# Patient Record
Sex: MALE | Race: WHITE | NOT HISPANIC OR LATINO | Employment: FULL TIME | URBAN - METROPOLITAN AREA
[De-identification: names, ages, dates, MRNs, and addresses within clinical notes are randomized per-mention and may not be internally consistent; named-entity substitution may affect disease eponyms.]

---

## 2017-01-30 RX ORDER — ATORVASTATIN CALCIUM 80 MG/1
80 TABLET, FILM COATED ORAL NIGHTLY
Qty: 90 TABLET | Refills: 0 | Status: SHIPPED | OUTPATIENT
Start: 2017-01-30 | End: 2017-05-08 | Stop reason: SDUPTHER

## 2017-01-30 RX ORDER — LISINOPRIL 5 MG/1
5 TABLET ORAL DAILY
Qty: 90 TABLET | Refills: 0 | Status: SHIPPED | OUTPATIENT
Start: 2017-01-30 | End: 2017-05-08 | Stop reason: SDUPTHER

## 2017-05-05 RX ORDER — ATORVASTATIN CALCIUM 80 MG/1
TABLET, FILM COATED ORAL
Qty: 90 TABLET | Refills: 0 | Status: CANCELLED | OUTPATIENT
Start: 2017-05-05

## 2017-05-05 RX ORDER — LISINOPRIL 5 MG/1
TABLET ORAL
Qty: 90 TABLET | Refills: 0 | Status: CANCELLED | OUTPATIENT
Start: 2017-05-05

## 2017-05-08 ENCOUNTER — TELEPHONE (OUTPATIENT)
Dept: CARDIOLOGY | Facility: CLINIC | Age: 57
End: 2017-05-08

## 2017-05-08 DIAGNOSIS — I25.10 CORONARY ARTERY DISEASE INVOLVING NATIVE CORONARY ARTERY OF NATIVE HEART WITHOUT ANGINA PECTORIS: Primary | ICD-10-CM

## 2017-05-08 RX ORDER — ATORVASTATIN CALCIUM 80 MG/1
80 TABLET, FILM COATED ORAL NIGHTLY
Qty: 90 TABLET | Refills: 0 | Status: SHIPPED | OUTPATIENT
Start: 2017-05-08 | End: 2017-12-20 | Stop reason: SDUPTHER

## 2017-05-08 RX ORDER — LISINOPRIL 5 MG/1
5 TABLET ORAL DAILY
Qty: 90 TABLET | Refills: 0 | Status: SHIPPED | OUTPATIENT
Start: 2017-05-08 | End: 2017-12-20 | Stop reason: SDUPTHER

## 2017-05-26 ENCOUNTER — OFFICE VISIT (OUTPATIENT)
Dept: CARDIOLOGY | Facility: CLINIC | Age: 57
End: 2017-05-26

## 2017-05-26 VITALS
DIASTOLIC BLOOD PRESSURE: 82 MMHG | WEIGHT: 205 LBS | HEIGHT: 72 IN | HEART RATE: 68 BPM | BODY MASS INDEX: 27.77 KG/M2 | SYSTOLIC BLOOD PRESSURE: 124 MMHG

## 2017-05-26 DIAGNOSIS — E78.5 HYPERLIPIDEMIA, UNSPECIFIED HYPERLIPIDEMIA TYPE: ICD-10-CM

## 2017-05-26 DIAGNOSIS — I10 ESSENTIAL HYPERTENSION: ICD-10-CM

## 2017-05-26 DIAGNOSIS — I25.10 ATHEROSCLEROSIS OF NATIVE CORONARY ARTERY OF NATIVE HEART WITHOUT ANGINA PECTORIS: Primary | ICD-10-CM

## 2017-05-26 DIAGNOSIS — I73.9 PAD (PERIPHERAL ARTERY DISEASE) (HCC): ICD-10-CM

## 2017-05-26 PROCEDURE — 99214 OFFICE O/P EST MOD 30 MIN: CPT | Performed by: NURSE PRACTITIONER

## 2017-05-26 PROCEDURE — 93000 ELECTROCARDIOGRAM COMPLETE: CPT | Performed by: NURSE PRACTITIONER

## 2017-05-26 RX ORDER — ASPIRIN 81 MG/1
81 TABLET ORAL 2 TIMES DAILY
COMMUNITY
End: 2019-07-10

## 2017-05-26 RX ORDER — CILOSTAZOL 100 MG/1
1 TABLET ORAL 2 TIMES DAILY
Refills: 3 | COMMUNITY
Start: 2017-05-01 | End: 2019-07-10

## 2017-12-20 RX ORDER — LISINOPRIL 5 MG/1
TABLET ORAL
Qty: 90 TABLET | Refills: 0 | Status: SHIPPED | OUTPATIENT
Start: 2017-12-20 | End: 2018-04-03 | Stop reason: SDUPTHER

## 2017-12-20 RX ORDER — ATORVASTATIN CALCIUM 80 MG/1
TABLET, FILM COATED ORAL
Qty: 90 TABLET | Refills: 0 | Status: SHIPPED | OUTPATIENT
Start: 2017-12-20 | End: 2018-04-15 | Stop reason: SDUPTHER

## 2018-04-04 RX ORDER — LISINOPRIL 5 MG/1
TABLET ORAL
Qty: 90 TABLET | Refills: 0 | Status: SHIPPED | OUTPATIENT
Start: 2018-04-04 | End: 2018-11-03 | Stop reason: HOSPADM

## 2018-04-17 RX ORDER — ATORVASTATIN CALCIUM 80 MG/1
80 TABLET, FILM COATED ORAL NIGHTLY
Qty: 30 TABLET | Refills: 0 | Status: SHIPPED | OUTPATIENT
Start: 2018-04-17 | End: 2018-05-26

## 2018-05-01 ENCOUNTER — OFFICE VISIT (OUTPATIENT)
Dept: CARDIOLOGY | Facility: CLINIC | Age: 58
End: 2018-05-01

## 2018-05-01 VITALS
HEART RATE: 74 BPM | WEIGHT: 205.2 LBS | BODY MASS INDEX: 27.79 KG/M2 | DIASTOLIC BLOOD PRESSURE: 80 MMHG | SYSTOLIC BLOOD PRESSURE: 122 MMHG | HEIGHT: 72 IN

## 2018-05-01 DIAGNOSIS — R06.02 SHORTNESS OF BREATH: ICD-10-CM

## 2018-05-01 DIAGNOSIS — I73.9 PAD (PERIPHERAL ARTERY DISEASE) (HCC): ICD-10-CM

## 2018-05-01 DIAGNOSIS — E78.5 HYPERLIPIDEMIA, UNSPECIFIED HYPERLIPIDEMIA TYPE: ICD-10-CM

## 2018-05-01 DIAGNOSIS — I25.10 ATHEROSCLEROSIS OF NATIVE CORONARY ARTERY OF NATIVE HEART WITHOUT ANGINA PECTORIS: Primary | ICD-10-CM

## 2018-05-01 DIAGNOSIS — I10 ESSENTIAL HYPERTENSION: ICD-10-CM

## 2018-05-01 PROCEDURE — 99214 OFFICE O/P EST MOD 30 MIN: CPT | Performed by: INTERNAL MEDICINE

## 2018-05-01 PROCEDURE — 93000 ELECTROCARDIOGRAM COMPLETE: CPT | Performed by: INTERNAL MEDICINE

## 2018-05-01 NOTE — PROGRESS NOTES
Date of Office Visit: 2018  Encounter Provider: Germania Peterson MD  Place of Service: [facility] CARDIOLOGY  Patient Name: Sylvain Amador  :1960      Patient ID:  Sylvain Amador is a 57 y.o. male is here for  followup for CAD.         History of Present Illness       He was seen in 10/2014 for chest pain after he had made an emergency department visit. He  was having daily chest pain worsening with activity. He rated it an 8 out of 10 in the  midsternal area going to the shoulders and arms, associated with short-windedness, nausea,  and dizziness. He also had some cold chills. When I saw him, I was concerned this was  coronary ischemia, so we set him up for a stress nuclear perfusion study done 10/24/2014,  showing a small infarct with a moderate amount of anuradha-infarct ischemia in the inferior  wall. Ejection fraction was 46%. He also had complaints of lower extremity cramping with  walking. He would have to stop and rest before he would be able to go on. He had a lower  extremity arterial duplex study done 10/24/2014, showing a severe obstructive disease  involving the left and right common femoral and superficial femoral artery systems.      On 10/28/2014, he underwent cardiac catheterization, which showed 40% diffuse  emb-pu-szbeqt LAD stenosis, 90% discrete OM-1 stenosis, 90% discrete mid-RCA stenosis, 40%  distal RCA stenosis of the bifurcation. He had plain old balloon angioplasty at the first  obtuse marginal branch, and he received a 3.5 x 15 mm Xience Xpedition drug-eluting stent  to the mid-RCA. The OM-1 branch did not get a stent. His ejection fraction at cath was  normal.      He then on 2014 had a CTA of the abdomen and pelvis with lower extremity runoff.  This showed completely occlusion of the superficial femoral arteries bilaterally with  reconstitution of popliteal arteries via the profunda femoral collaterals. There was also  a fusiform  infrarenal aortic aneurysm measuring 3.5 cm. He is scheduled to see Dr. Reyes  12/29/2014 for this.         He went on to see Dr. Reyes because of the claudication in his legs, which he still  has.  Dr. Reyes recommended angiography which the patient had done on 01/23/2015.  This  showed atherosclerotic disease of the aorta which was moderate and diffuse.  SFA was  occluded on the right.  SFA was occluded on the left.  Mild disease in the common femoral  artery bilaterally.  Popliteal artery was normal; reconstitution with two vessel runoff to  both of his feet.  There was a mild infrarenal abdominal aortic aneurysm.  Dr. Reyes said  there was nothing more he could do and recommended that the patient go on to see vascular  surgery. He did see Dr. Arturo Huynh who said that he really  cannot do surgery outside of vein transplantation which does not last. He has elected to  really go after the lifestyle changes, and he is doing better. He has a stress nuclear  perfusion study done for coronary disease in July 2015 that showed a small area of  myocardial infarction in the inferior wall but no ischemia and no wall motion  abnormalities. He had a 2D echocardiogram with Doppler done 07/08/2015 showing an  ejection fraction of 66%, grade 1 diastolic dysfunction, and mild aortic root dilation but  no valvular abnormalities. He had lower extremity arterial duplex studies done on  05/11/2015. The right ankle brachial index showed several arterial occlusive disease with  severe digital ischemia of the right toe. The atrial occlusive disease in the right leg  was in the aortoiliac and femoral segments. The ankle brachial index on the left showed  moderate arterial occlusive disease with moderate digital ischemia and moderate occlusive  disease of the aortoiliac and femoral segments.     He is smoking.  His wife has had breast cancer so he's been under some stress.  He has a little more short winded with activity.  He is having no chest  pain or pressure.  He's had no tachycardia, dizziness or syncope.  He is not exercising.  He does have claudication.  He is taking his medications as directed.  He has not recently had any laboratory values done.  He continues to drive bus.       Past Medical History:   Diagnosis Date   • Atherosclerosis of native coronary artery of native heart without angina pectoris    • CAD (coronary artery disease)    • Chest pain    • Coronary atherosclerosis of native coronary vessel    • Hyperlipidemia    • Hypertension    • Intermittent claudication    • PAD (peripheral artery disease)     of lower extremities         Past Surgical History:   Procedure Laterality Date   • VASECTOMY         Current Outpatient Prescriptions on File Prior to Visit   Medication Sig Dispense Refill   • aspirin 81 MG EC tablet Take 81 mg by mouth 2 (Two) Times a Day.     • atorvastatin (LIPITOR) 80 MG tablet Take 1 tablet by mouth Every Night. 30 tablet 0   • cilostazol (PLETAL) 100 MG tablet Take 1 tablet by mouth 2 (Two) Times a Day.  3   • lisinopril (PRINIVIL,ZESTRIL) 5 MG tablet TAKE 1 TABLET BY MOUTH DAILY. 90 tablet 0   • metoprolol tartrate (LOPRESSOR) 25 MG tablet TAKE 1/2 TABLET BY MOUTH TWICE DAILY. 90 tablet 0     No current facility-administered medications on file prior to visit.        Social History     Social History   • Marital status: Single     Spouse name: N/A   • Number of children: N/A   • Years of education: N/A     Occupational History   • Not on file.     Social History Main Topics   • Smoking status: Current Every Day Smoker     Packs/day: 0.50   • Smokeless tobacco: Former User      Comment: caffeine use   • Alcohol use No   • Drug use: No   • Sexual activity: Not on file     Other Topics Concern   • Not on file     Social History Narrative   • No narrative on file           Review of Systems   Constitution: Negative.   HENT: Negative for congestion.    Eyes: Negative for vision loss in left eye and vision loss in  "right eye.   Cardiovascular: Positive for leg swelling.   Respiratory: Negative.  Negative for cough, hemoptysis, shortness of breath, sleep disturbances due to breathing, snoring, sputum production and wheezing.    Endocrine: Negative.    Hematologic/Lymphatic: Negative.    Skin: Negative for poor wound healing and rash.   Musculoskeletal: Negative for falls, gout, muscle cramps and myalgias.   Gastrointestinal: Negative for abdominal pain, diarrhea, dysphagia, hematemesis, melena, nausea and vomiting.   Neurological: Negative for excessive daytime sleepiness, dizziness, headaches, light-headedness, loss of balance, seizures and vertigo.   Psychiatric/Behavioral: Negative for depression and substance abuse. The patient is not nervous/anxious.        Procedures    ECG 12 Lead  Date/Time: 5/1/2018 11:00 AM  Performed by: MARGO BECKHAM  Authorized by: MARGO BECKHAM   Comparison: compared with previous ECG   Similar to previous ECG  Rhythm: sinus rhythm  Clinical impression: normal ECG                Objective:      Vitals:    05/01/18 1048   BP: 122/80   BP Location: Left arm   Patient Position: Sitting   Pulse: 74   Weight: 93.1 kg (205 lb 3.2 oz)   Height: 182.9 cm (72\")     Body mass index is 27.83 kg/m².    Physical Exam   Constitutional: He is oriented to person, place, and time. He appears well-developed and well-nourished. No distress.   HENT:   Head: Normocephalic and atraumatic.   Eyes: Conjunctivae are normal. No scleral icterus.   Neck: Neck supple. No JVD present. Carotid bruit is not present. No thyromegaly present.   Cardiovascular: Normal rate, regular rhythm, S1 normal, S2 normal, normal heart sounds and intact distal pulses.   No extrasystoles are present. PMI is not displaced.  Exam reveals no gallop.    No murmur heard.  Pulses:       Carotid pulses are 2+ on the right side, and 2+ on the left side.       Radial pulses are 2+ on the right side, and 2+ on the left side.        Dorsalis " pedis pulses are 2+ on the right side, and 2+ on the left side.        Posterior tibial pulses are 2+ on the right side, and 2+ on the left side.   Pulmonary/Chest: Effort normal and breath sounds normal. No respiratory distress. He has no wheezes. He has no rhonchi. He has no rales. He exhibits no tenderness.   Abdominal: Soft. Bowel sounds are normal. He exhibits no distension, no abdominal bruit and no mass. There is no tenderness.   Musculoskeletal: He exhibits no edema or deformity.   Lymphadenopathy:     He has no cervical adenopathy.   Neurological: He is alert and oriented to person, place, and time. No cranial nerve deficit.   Skin: Skin is warm and dry. No rash noted. He is not diaphoretic. No cyanosis. No pallor. Nails show no clubbing.   Psychiatric: He has a normal mood and affect. Judgment normal.   Vitals reviewed.      Lab Review:       Assessment:      Diagnosis Plan   1. Atherosclerosis of native coronary artery of native heart without angina pectoris  Stress Test With Myocardial Perfusion One Day    Comprehensive Metabolic Panel    Lipid Panel   2. Essential hypertension  Stress Test With Myocardial Perfusion One Day   3. Hyperlipidemia, unspecified hyperlipidemia type     4. PAD (peripheral artery disease)  Stress Test With Myocardial Perfusion One Day   5. Shortness of breath  Stress Test With Myocardial Perfusion One Day     1. CAD, s/p POBA to OM1 and PCI with stent to mid RCA.  No angina. Has dyspnea on exertion, set up stress nuclear study.  2. Peripheral arterial disease. Occlusion of bilateral superficial femoral arteries. Sees Dr. Huynh.  Is treating this with lifestyle changes and is claudication has improved.   3. Smoking. He is smoking.   4. Hyperlipidemia, on Atorvastatin.   5. HTN, well controlled.      Plan:       Advised smoking cessation, check lipids, set up stress study.  See jamison in 1 year, no med changes.     Coronary Artery Disease  Assessment  • The patient has no  angina    Subjective - Objective  • There has been a previous stent procedure using JARRED  • Current antiplatelet therapy includes aspirin 81 mg

## 2018-05-04 ENCOUNTER — HOSPITAL ENCOUNTER (OUTPATIENT)
Dept: CARDIOLOGY | Facility: HOSPITAL | Age: 58
Discharge: HOME | End: 2018-05-04
Attending: INTERNAL MEDICINE | Admitting: INTERNAL MEDICINE

## 2018-05-04 ENCOUNTER — TELEPHONE (OUTPATIENT)
Dept: CARDIOLOGY | Facility: CLINIC | Age: 58
End: 2018-05-04

## 2018-05-04 DIAGNOSIS — R06.02 SHORTNESS OF BREATH: ICD-10-CM

## 2018-05-04 DIAGNOSIS — I25.10 ATHEROSCLEROSIS OF NATIVE CORONARY ARTERY OF NATIVE HEART WITHOUT ANGINA PECTORIS: ICD-10-CM

## 2018-05-04 DIAGNOSIS — I73.9 PAD (PERIPHERAL ARTERY DISEASE) (HCC): ICD-10-CM

## 2018-05-04 DIAGNOSIS — I10 ESSENTIAL HYPERTENSION: ICD-10-CM

## 2018-05-04 LAB
BH CV NUCLEAR PRIOR STUDY: 2
BH CV STRESS BP STAGE 1: NORMAL
BH CV STRESS COMMENTS STAGE 1: NORMAL
BH CV STRESS DOSE REGADENOSON STAGE 1: 0.4
BH CV STRESS DURATION MIN STAGE 1: 0
BH CV STRESS DURATION SEC STAGE 1: 10
BH CV STRESS HR STAGE 1: 103
BH CV STRESS PROTOCOL 1: NORMAL
BH CV STRESS RECOVERY BP: NORMAL MMHG
BH CV STRESS RECOVERY HR: 91 BPM
BH CV STRESS STAGE 1: 1
LV EF NUC BP: 58 %
MAXIMAL PREDICTED HEART RATE: 161 BPM
PERCENT MAX PREDICTED HR: 63.98 %
STRESS BASELINE BP: NORMAL MMHG
STRESS BASELINE HR: 76 BPM
STRESS PERCENT HR: 75 %
STRESS POST EXERCISE DUR SEC: 10 SEC
STRESS POST PEAK BP: NORMAL MMHG
STRESS POST PEAK HR: 103 BPM
STRESS TARGET HR: 137 BPM

## 2018-05-04 PROCEDURE — 93016 CV STRESS TEST SUPVJ ONLY: CPT | Performed by: INTERNAL MEDICINE

## 2018-05-04 PROCEDURE — 93018 CV STRESS TEST I&R ONLY: CPT | Performed by: INTERNAL MEDICINE

## 2018-05-04 PROCEDURE — 25010000002 REGADENOSON 0.4 MG/5ML SOLUTION: Performed by: INTERNAL MEDICINE

## 2018-05-04 PROCEDURE — 78452 HT MUSCLE IMAGE SPECT MULT: CPT | Performed by: INTERNAL MEDICINE

## 2018-05-04 PROCEDURE — A9502 TC99M TETROFOSMIN: HCPCS | Performed by: INTERNAL MEDICINE

## 2018-05-04 PROCEDURE — 0 TECHNETIUM TETROFOSMIN KIT: Performed by: INTERNAL MEDICINE

## 2018-05-04 PROCEDURE — 93017 CV STRESS TEST TRACING ONLY: CPT

## 2018-05-04 PROCEDURE — 78452 HT MUSCLE IMAGE SPECT MULT: CPT

## 2018-05-04 RX ADMIN — TETROFOSMIN 1 DOSE: 1.38 INJECTION, POWDER, LYOPHILIZED, FOR SOLUTION INTRAVENOUS at 08:50

## 2018-05-04 RX ADMIN — TETROFOSMIN 1 DOSE: 1.38 INJECTION, POWDER, LYOPHILIZED, FOR SOLUTION INTRAVENOUS at 07:55

## 2018-05-04 RX ADMIN — REGADENOSON 0.4 MG: 0.08 INJECTION, SOLUTION INTRAVENOUS at 08:50

## 2018-05-04 NOTE — TELEPHONE ENCOUNTER
----- Message from Germania Peterson MD sent at 5/4/2018 12:05 PM EDT -----  pls let him know that his stress is normal, no changes, see back as scheduled.

## 2018-05-04 NOTE — TELEPHONE ENCOUNTER
Called and l/m for pt.     Can you call or put the pt on re call list for 1 year f/u to see Sabrina TAMAYO.....Thanks    MT

## 2018-05-07 RX ORDER — LISINOPRIL 5 MG/1
TABLET ORAL
Qty: 90 TABLET | Refills: 1 | Status: SHIPPED | OUTPATIENT
Start: 2018-05-07 | End: 2018-11-03 | Stop reason: HOSPADM

## 2018-05-29 RX ORDER — ATORVASTATIN CALCIUM 80 MG/1
80 TABLET, FILM COATED ORAL NIGHTLY
Qty: 30 TABLET | Refills: 0 | Status: SHIPPED | OUTPATIENT
Start: 2018-05-29 | End: 2018-11-05

## 2018-08-01 ENCOUNTER — HOSPITAL ENCOUNTER (INPATIENT)
Facility: HOSPITAL | Age: 58
LOS: 3 days | Discharge: HOME | End: 2018-08-04
Attending: EMERGENCY MEDICINE | Admitting: SURGERY

## 2018-08-01 ENCOUNTER — APPOINTMENT (OUTPATIENT)
Dept: CT IMAGING | Facility: HOSPITAL | Age: 58
End: 2018-08-01

## 2018-08-01 DIAGNOSIS — I72.3 BILATERAL ILIAC ARTERY ANEURYSM (HCC): ICD-10-CM

## 2018-08-01 DIAGNOSIS — I71.40 ABDOMINAL AORTIC ANEURYSM (AAA) WITHOUT RUPTURE (HCC): Primary | ICD-10-CM

## 2018-08-01 LAB
ALBUMIN SERPL-MCNC: 4.5 G/DL (ref 3.5–5.2)
ALBUMIN/GLOB SERPL: 1.4 G/DL
ALP SERPL-CCNC: 70 U/L (ref 39–117)
ALT SERPL W P-5'-P-CCNC: 15 U/L (ref 1–41)
ANION GAP SERPL CALCULATED.3IONS-SCNC: 13.3 MMOL/L
AST SERPL-CCNC: 10 U/L (ref 1–40)
BASOPHILS # BLD AUTO: 0.03 10*3/MM3 (ref 0–0.2)
BASOPHILS NFR BLD AUTO: 0.3 % (ref 0–1.5)
BILIRUB SERPL-MCNC: 0.3 MG/DL (ref 0.1–1.2)
BILIRUB UR QL STRIP: NEGATIVE
BUN BLD-MCNC: 11 MG/DL (ref 6–20)
BUN/CREAT SERPL: 12.9 (ref 7–25)
CALCIUM SPEC-SCNC: 8.9 MG/DL (ref 8.6–10.5)
CHLORIDE SERPL-SCNC: 102 MMOL/L (ref 98–107)
CLARITY UR: CLEAR
CO2 SERPL-SCNC: 22.7 MMOL/L (ref 22–29)
COLOR UR: YELLOW
CREAT BLD-MCNC: 0.85 MG/DL (ref 0.76–1.27)
DEPRECATED RDW RBC AUTO: 46.6 FL (ref 37–54)
EOSINOPHIL # BLD AUTO: 0.11 10*3/MM3 (ref 0–0.7)
EOSINOPHIL NFR BLD AUTO: 1.1 % (ref 0.3–6.2)
ERYTHROCYTE [DISTWIDTH] IN BLOOD BY AUTOMATED COUNT: 13.9 % (ref 11.5–14.5)
GFR SERPL CREATININE-BSD FRML MDRD: 92 ML/MIN/1.73
GLOBULIN UR ELPH-MCNC: 3.2 GM/DL
GLUCOSE BLD-MCNC: 107 MG/DL (ref 65–99)
GLUCOSE UR STRIP-MCNC: NEGATIVE MG/DL
HCT VFR BLD AUTO: 46.8 % (ref 40.4–52.2)
HGB BLD-MCNC: 15.9 G/DL (ref 13.7–17.6)
HGB UR QL STRIP.AUTO: NEGATIVE
IMM GRANULOCYTES # BLD: 0.03 10*3/MM3 (ref 0–0.03)
IMM GRANULOCYTES NFR BLD: 0.3 % (ref 0–0.5)
KETONES UR QL STRIP: NEGATIVE
LEUKOCYTE ESTERASE UR QL STRIP.AUTO: NEGATIVE
LYMPHOCYTES # BLD AUTO: 2.89 10*3/MM3 (ref 0.9–4.8)
LYMPHOCYTES NFR BLD AUTO: 27.9 % (ref 19.6–45.3)
MCH RBC QN AUTO: 31.1 PG (ref 27–32.7)
MCHC RBC AUTO-ENTMCNC: 34 G/DL (ref 32.6–36.4)
MCV RBC AUTO: 91.4 FL (ref 79.8–96.2)
MONOCYTES # BLD AUTO: 0.73 10*3/MM3 (ref 0.2–1.2)
MONOCYTES NFR BLD AUTO: 7.1 % (ref 5–12)
NEUTROPHILS # BLD AUTO: 6.59 10*3/MM3 (ref 1.9–8.1)
NEUTROPHILS NFR BLD AUTO: 63.6 % (ref 42.7–76)
NITRITE UR QL STRIP: NEGATIVE
NRBC BLD MANUAL-RTO: 0 /100 WBC (ref 0–0)
PH UR STRIP.AUTO: 6 [PH] (ref 5–8)
PLATELET # BLD AUTO: 150 10*3/MM3 (ref 140–500)
PMV BLD AUTO: 11 FL (ref 6–12)
POTASSIUM BLD-SCNC: 3.9 MMOL/L (ref 3.5–5.2)
PROT SERPL-MCNC: 7.7 G/DL (ref 6–8.5)
PROT UR QL STRIP: NEGATIVE
RBC # BLD AUTO: 5.12 10*6/MM3 (ref 4.6–6)
SODIUM BLD-SCNC: 138 MMOL/L (ref 136–145)
SP GR UR STRIP: 1.01 (ref 1–1.03)
UROBILINOGEN UR QL STRIP: NORMAL
WBC NRBC COR # BLD: 10.35 10*3/MM3 (ref 4.5–10.7)

## 2018-08-01 PROCEDURE — 99284 EMERGENCY DEPT VISIT MOD MDM: CPT

## 2018-08-01 PROCEDURE — 85025 COMPLETE CBC W/AUTO DIFF WBC: CPT | Performed by: NURSE PRACTITIONER

## 2018-08-01 PROCEDURE — 80053 COMPREHEN METABOLIC PANEL: CPT | Performed by: NURSE PRACTITIONER

## 2018-08-01 PROCEDURE — 25010000002 MORPHINE PER 10 MG: Performed by: NURSE PRACTITIONER

## 2018-08-01 PROCEDURE — 81003 URINALYSIS AUTO W/O SCOPE: CPT | Performed by: NURSE PRACTITIONER

## 2018-08-01 PROCEDURE — 25010000002 ONDANSETRON PER 1 MG: Performed by: NURSE PRACTITIONER

## 2018-08-01 PROCEDURE — 74177 CT ABD & PELVIS W/CONTRAST: CPT

## 2018-08-01 PROCEDURE — 25010000002 IOPAMIDOL 61 % SOLUTION: Performed by: EMERGENCY MEDICINE

## 2018-08-01 RX ORDER — ACETAMINOPHEN 325 MG/1
650 TABLET ORAL EVERY 4 HOURS PRN
Status: DISCONTINUED | OUTPATIENT
Start: 2018-08-01 | End: 2018-08-02 | Stop reason: HOSPADM

## 2018-08-01 RX ORDER — SODIUM CHLORIDE 0.9 % (FLUSH) 0.9 %
10 SYRINGE (ML) INJECTION AS NEEDED
Status: DISCONTINUED | OUTPATIENT
Start: 2018-08-01 | End: 2018-08-02 | Stop reason: HOSPADM

## 2018-08-01 RX ORDER — NITROGLYCERIN 0.4 MG/1
0.4 TABLET SUBLINGUAL
Status: DISCONTINUED | OUTPATIENT
Start: 2018-08-01 | End: 2018-08-02 | Stop reason: HOSPADM

## 2018-08-01 RX ORDER — ONDANSETRON 2 MG/ML
4 INJECTION INTRAMUSCULAR; INTRAVENOUS ONCE
Status: COMPLETED | OUTPATIENT
Start: 2018-08-01 | End: 2018-08-01

## 2018-08-01 RX ORDER — SODIUM CHLORIDE 0.9 % (FLUSH) 0.9 %
1-10 SYRINGE (ML) INJECTION AS NEEDED
Status: DISCONTINUED | OUTPATIENT
Start: 2018-08-01 | End: 2018-08-02 | Stop reason: HOSPADM

## 2018-08-01 RX ADMIN — MORPHINE SULFATE 4 MG: 4 INJECTION INTRAVENOUS at 21:15

## 2018-08-01 RX ADMIN — SODIUM CHLORIDE 1000 ML: 9 INJECTION, SOLUTION INTRAVENOUS at 21:55

## 2018-08-01 RX ADMIN — IOPAMIDOL 85 ML: 612 INJECTION, SOLUTION INTRAVENOUS at 22:03

## 2018-08-01 RX ADMIN — ONDANSETRON 4 MG: 2 INJECTION, SOLUTION INTRAMUSCULAR; INTRAVENOUS at 21:15

## 2018-08-02 ENCOUNTER — ANESTHESIA (OUTPATIENT)
Dept: PERIOP | Facility: HOSPITAL | Age: 58
End: 2018-08-02

## 2018-08-02 ENCOUNTER — APPOINTMENT (OUTPATIENT)
Dept: CT IMAGING | Facility: HOSPITAL | Age: 58
End: 2018-08-02

## 2018-08-02 ENCOUNTER — ANESTHESIA EVENT (OUTPATIENT)
Dept: PERIOP | Facility: HOSPITAL | Age: 58
End: 2018-08-02

## 2018-08-02 ENCOUNTER — APPOINTMENT (OUTPATIENT)
Dept: GENERAL RADIOLOGY | Facility: HOSPITAL | Age: 58
End: 2018-08-02

## 2018-08-02 LAB
ABO GROUP BLD: NORMAL
BLD GP AB SCN SERPL QL: NEGATIVE
GLUCOSE BLDC GLUCOMTR-MCNC: 124 MG/DL (ref 70–130)
GLUCOSE BLDC GLUCOMTR-MCNC: 137 MG/DL (ref 70–130)
GLUCOSE BLDC GLUCOMTR-MCNC: 158 MG/DL (ref 70–130)
GLUCOSE BLDC GLUCOMTR-MCNC: 179 MG/DL (ref 70–130)
RH BLD: POSITIVE
T&S EXPIRATION DATE: NORMAL

## 2018-08-02 PROCEDURE — C1874 STENT, COATED/COV W/DEL SYS: HCPCS | Performed by: SURGERY

## 2018-08-02 PROCEDURE — C1894 INTRO/SHEATH, NON-LASER: HCPCS | Performed by: SURGERY

## 2018-08-02 PROCEDURE — C1769 GUIDE WIRE: HCPCS | Performed by: SURGERY

## 2018-08-02 PROCEDURE — 36415 COLL VENOUS BLD VENIPUNCTURE: CPT | Performed by: SURGERY

## 2018-08-02 PROCEDURE — 04V03D6 RESTRICT ABD AORTA, BIFURC, W INTRALUM DEV, PERC: ICD-10-PCS | Performed by: SURGERY

## 2018-08-02 PROCEDURE — 25010000002 NEOSTIGMINE PER 0.5 MG: Performed by: NURSE ANESTHETIST, CERTIFIED REGISTERED

## 2018-08-02 PROCEDURE — 25010000002 MORPHINE SULFATE (PF) 2 MG/ML SOLUTION: Performed by: SURGERY

## 2018-08-02 PROCEDURE — 25010000002 MORPHINE PER 10 MG: Performed by: EMERGENCY MEDICINE

## 2018-08-02 PROCEDURE — C1760 CLOSURE DEV, VASC: HCPCS | Performed by: SURGERY

## 2018-08-02 PROCEDURE — C1725 CATH, TRANSLUMIN NON-LASER: HCPCS | Performed by: SURGERY

## 2018-08-02 PROCEDURE — 86901 BLOOD TYPING SEROLOGIC RH(D): CPT | Performed by: SURGERY

## 2018-08-02 PROCEDURE — 82962 GLUCOSE BLOOD TEST: CPT

## 2018-08-02 PROCEDURE — 25010000002 PROTAMINE SULFATE PER 10 MG: Performed by: NURSE ANESTHETIST, CERTIFIED REGISTERED

## 2018-08-02 PROCEDURE — 25010000002 MIDAZOLAM PER 1 MG: Performed by: ANESTHESIOLOGY

## 2018-08-02 PROCEDURE — 25010000002 HEPARIN (PORCINE) PER 1000 UNITS: Performed by: SURGERY

## 2018-08-02 PROCEDURE — 86900 BLOOD TYPING SEROLOGIC ABO: CPT | Performed by: SURGERY

## 2018-08-02 PROCEDURE — 25010000002 DEXAMETHASONE PER 1 MG: Performed by: NURSE ANESTHETIST, CERTIFIED REGISTERED

## 2018-08-02 PROCEDURE — 04VD3DZ RESTRICTION OF LEFT COMMON ILIAC ARTERY WITH INTRALUMINAL DEVICE, PERCUTANEOUS APPROACH: ICD-10-PCS | Performed by: SURGERY

## 2018-08-02 PROCEDURE — 25010000002 FENTANYL CITRATE (PF) 100 MCG/2ML SOLUTION: Performed by: NURSE ANESTHETIST, CERTIFIED REGISTERED

## 2018-08-02 PROCEDURE — 25010000002 PHENYLEPHRINE PER 1 ML: Performed by: NURSE ANESTHETIST, CERTIFIED REGISTERED

## 2018-08-02 PROCEDURE — 25010000002 HEPARIN (PORCINE) PER 1000 UNITS: Performed by: NURSE ANESTHETIST, CERTIFIED REGISTERED

## 2018-08-02 PROCEDURE — C1773 RET DEV, INSERTABLE: HCPCS | Performed by: SURGERY

## 2018-08-02 PROCEDURE — 85347 COAGULATION TIME ACTIVATED: CPT

## 2018-08-02 PROCEDURE — 25010000002 HYDROMORPHONE PER 4 MG: Performed by: NURSE ANESTHETIST, CERTIFIED REGISTERED

## 2018-08-02 PROCEDURE — 86850 RBC ANTIBODY SCREEN: CPT | Performed by: SURGERY

## 2018-08-02 PROCEDURE — 25010000002 PROPOFOL 10 MG/ML EMULSION: Performed by: NURSE ANESTHETIST, CERTIFIED REGISTERED

## 2018-08-02 PROCEDURE — 0 IOPAMIDOL PER 1 ML: Performed by: SURGERY

## 2018-08-02 PROCEDURE — 25010000002 ONDANSETRON PER 1 MG: Performed by: NURSE ANESTHETIST, CERTIFIED REGISTERED

## 2018-08-02 PROCEDURE — 25010000003 CEFAZOLIN IN DEXTROSE 2-4 GM/100ML-% SOLUTION: Performed by: SURGERY

## 2018-08-02 PROCEDURE — 74174 CTA ABD&PLVS W/CONTRAST: CPT

## 2018-08-02 PROCEDURE — 25010000002 FENTANYL CITRATE (PF) 100 MCG/2ML SOLUTION: Performed by: ANESTHESIOLOGY

## 2018-08-02 DEVICE — GRFT EXCLDR CONTRALAT 14F 23MM 10CM: Type: IMPLANTABLE DEVICE | Site: ARTERY ILIAC | Status: FUNCTIONAL

## 2018-08-02 DEVICE — IMPLANTABLE DEVICE: Type: IMPLANTABLE DEVICE | Site: ARTERY ILIAC | Status: FUNCTIONAL

## 2018-08-02 DEVICE — STENTGR ENDOPROSTH ILIAC GORE EXCLUDER 16F 10TO11MM 10CM: Type: IMPLANTABLE DEVICE | Site: ARTERY ILIAC | Status: FUNCTIONAL

## 2018-08-02 DEVICE — GRFT EXCLDR C3 TRNK I/LAT 31X14.5MM13CM: Type: IMPLANTABLE DEVICE | Site: AORTA | Status: FUNCTIONAL

## 2018-08-02 DEVICE — GRFT EXCLDR CONTRALAT 15F 27MM 10CM: Type: IMPLANTABLE DEVICE | Site: ARTERY ILIAC | Status: FUNCTIONAL

## 2018-08-02 RX ORDER — SODIUM CHLORIDE, SODIUM LACTATE, POTASSIUM CHLORIDE, CALCIUM CHLORIDE 600; 310; 30; 20 MG/100ML; MG/100ML; MG/100ML; MG/100ML
9 INJECTION, SOLUTION INTRAVENOUS CONTINUOUS
Status: DISCONTINUED | OUTPATIENT
Start: 2018-08-02 | End: 2018-08-02 | Stop reason: HOSPADM

## 2018-08-02 RX ORDER — HYDROMORPHONE HCL 110MG/55ML
PATIENT CONTROLLED ANALGESIA SYRINGE INTRAVENOUS AS NEEDED
Status: DISCONTINUED | OUTPATIENT
Start: 2018-08-02 | End: 2018-08-02 | Stop reason: SURG

## 2018-08-02 RX ORDER — FENTANYL CITRATE 50 UG/ML
50 INJECTION, SOLUTION INTRAMUSCULAR; INTRAVENOUS
Status: DISCONTINUED | OUTPATIENT
Start: 2018-08-02 | End: 2018-08-02 | Stop reason: HOSPADM

## 2018-08-02 RX ORDER — LISINOPRIL 5 MG/1
5 TABLET ORAL DAILY
Status: DISCONTINUED | OUTPATIENT
Start: 2018-08-02 | End: 2018-08-04 | Stop reason: HOSPADM

## 2018-08-02 RX ORDER — IPRATROPIUM BROMIDE AND ALBUTEROL SULFATE 2.5; .5 MG/3ML; MG/3ML
3 SOLUTION RESPIRATORY (INHALATION)
Status: DISCONTINUED | OUTPATIENT
Start: 2018-08-02 | End: 2018-08-03

## 2018-08-02 RX ORDER — MORPHINE SULFATE 2 MG/ML
4 INJECTION, SOLUTION INTRAMUSCULAR; INTRAVENOUS EVERY 4 HOURS PRN
Status: DISCONTINUED | OUTPATIENT
Start: 2018-08-02 | End: 2018-08-04 | Stop reason: HOSPADM

## 2018-08-02 RX ORDER — HYDROMORPHONE HYDROCHLORIDE 1 MG/ML
0.5 INJECTION, SOLUTION INTRAMUSCULAR; INTRAVENOUS; SUBCUTANEOUS
Status: DISCONTINUED | OUTPATIENT
Start: 2018-08-02 | End: 2018-08-02 | Stop reason: HOSPADM

## 2018-08-02 RX ORDER — ACETAMINOPHEN 325 MG/1
650 TABLET ORAL ONCE AS NEEDED
Status: DISCONTINUED | OUTPATIENT
Start: 2018-08-02 | End: 2018-08-02 | Stop reason: HOSPADM

## 2018-08-02 RX ORDER — CILOSTAZOL 100 MG/1
100 TABLET ORAL 2 TIMES DAILY
Status: DISCONTINUED | OUTPATIENT
Start: 2018-08-02 | End: 2018-08-04 | Stop reason: HOSPADM

## 2018-08-02 RX ORDER — SODIUM CHLORIDE 9 MG/ML
125 INJECTION, SOLUTION INTRAVENOUS CONTINUOUS
Status: DISCONTINUED | OUTPATIENT
Start: 2018-08-02 | End: 2018-08-03

## 2018-08-02 RX ORDER — GLYCOPYRROLATE 0.2 MG/ML
INJECTION INTRAMUSCULAR; INTRAVENOUS AS NEEDED
Status: DISCONTINUED | OUTPATIENT
Start: 2018-08-02 | End: 2018-08-02 | Stop reason: SURG

## 2018-08-02 RX ORDER — HYDRALAZINE HYDROCHLORIDE 20 MG/ML
5 INJECTION INTRAMUSCULAR; INTRAVENOUS
Status: DISCONTINUED | OUTPATIENT
Start: 2018-08-02 | End: 2018-08-02 | Stop reason: HOSPADM

## 2018-08-02 RX ORDER — PROTAMINE SULFATE 10 MG/ML
INJECTION, SOLUTION INTRAVENOUS AS NEEDED
Status: DISCONTINUED | OUTPATIENT
Start: 2018-08-02 | End: 2018-08-02 | Stop reason: SURG

## 2018-08-02 RX ORDER — PROMETHAZINE HYDROCHLORIDE 25 MG/1
25 TABLET ORAL ONCE AS NEEDED
Status: DISCONTINUED | OUTPATIENT
Start: 2018-08-02 | End: 2018-08-02 | Stop reason: HOSPADM

## 2018-08-02 RX ORDER — ONDANSETRON 2 MG/ML
INJECTION INTRAMUSCULAR; INTRAVENOUS AS NEEDED
Status: DISCONTINUED | OUTPATIENT
Start: 2018-08-02 | End: 2018-08-02 | Stop reason: SURG

## 2018-08-02 RX ORDER — ONDANSETRON 2 MG/ML
4 INJECTION INTRAMUSCULAR; INTRAVENOUS ONCE AS NEEDED
Status: DISCONTINUED | OUTPATIENT
Start: 2018-08-02 | End: 2018-08-02 | Stop reason: HOSPADM

## 2018-08-02 RX ORDER — ATORVASTATIN CALCIUM 80 MG/1
80 TABLET, FILM COATED ORAL NIGHTLY
Status: DISCONTINUED | OUTPATIENT
Start: 2018-08-02 | End: 2018-08-04 | Stop reason: HOSPADM

## 2018-08-02 RX ORDER — FAMOTIDINE 10 MG/ML
20 INJECTION, SOLUTION INTRAVENOUS ONCE
Status: COMPLETED | OUTPATIENT
Start: 2018-08-02 | End: 2018-08-02

## 2018-08-02 RX ORDER — NITROGLYCERIN 0.4 MG/1
0.4 TABLET SUBLINGUAL
Status: DISCONTINUED | OUTPATIENT
Start: 2018-08-02 | End: 2018-08-04 | Stop reason: HOSPADM

## 2018-08-02 RX ORDER — OXYCODONE HYDROCHLORIDE AND ACETAMINOPHEN 5; 325 MG/1; MG/1
1 TABLET ORAL EVERY 4 HOURS PRN
Status: DISCONTINUED | OUTPATIENT
Start: 2018-08-02 | End: 2018-08-04 | Stop reason: HOSPADM

## 2018-08-02 RX ORDER — MIDAZOLAM HYDROCHLORIDE 1 MG/ML
1 INJECTION INTRAMUSCULAR; INTRAVENOUS
Status: DISCONTINUED | OUTPATIENT
Start: 2018-08-02 | End: 2018-08-02 | Stop reason: HOSPADM

## 2018-08-02 RX ORDER — PROPOFOL 10 MG/ML
VIAL (ML) INTRAVENOUS AS NEEDED
Status: DISCONTINUED | OUTPATIENT
Start: 2018-08-02 | End: 2018-08-02 | Stop reason: SURG

## 2018-08-02 RX ORDER — HEPARIN SODIUM 1000 [USP'U]/ML
INJECTION, SOLUTION INTRAVENOUS; SUBCUTANEOUS AS NEEDED
Status: DISCONTINUED | OUTPATIENT
Start: 2018-08-02 | End: 2018-08-02 | Stop reason: SURG

## 2018-08-02 RX ORDER — ASPIRIN 81 MG/1
81 TABLET ORAL DAILY
Status: DISCONTINUED | OUTPATIENT
Start: 2018-08-02 | End: 2018-08-04 | Stop reason: HOSPADM

## 2018-08-02 RX ORDER — SODIUM CHLORIDE 9 MG/ML
INJECTION, SOLUTION INTRAVENOUS CONTINUOUS PRN
Status: DISCONTINUED | OUTPATIENT
Start: 2018-08-02 | End: 2018-08-02 | Stop reason: SURG

## 2018-08-02 RX ORDER — DEXAMETHASONE SODIUM PHOSPHATE 10 MG/ML
INJECTION INTRAMUSCULAR; INTRAVENOUS AS NEEDED
Status: DISCONTINUED | OUTPATIENT
Start: 2018-08-02 | End: 2018-08-02 | Stop reason: SURG

## 2018-08-02 RX ORDER — PROMETHAZINE HYDROCHLORIDE 25 MG/1
25 SUPPOSITORY RECTAL ONCE AS NEEDED
Status: DISCONTINUED | OUTPATIENT
Start: 2018-08-02 | End: 2018-08-02 | Stop reason: HOSPADM

## 2018-08-02 RX ORDER — LABETALOL HYDROCHLORIDE 5 MG/ML
5 INJECTION, SOLUTION INTRAVENOUS
Status: DISCONTINUED | OUTPATIENT
Start: 2018-08-02 | End: 2018-08-02 | Stop reason: HOSPADM

## 2018-08-02 RX ORDER — BUPIVACAINE HYDROCHLORIDE 2.5 MG/ML
INJECTION, SOLUTION EPIDURAL; INFILTRATION; INTRACAUDAL AS NEEDED
Status: DISCONTINUED | OUTPATIENT
Start: 2018-08-02 | End: 2018-08-02 | Stop reason: HOSPADM

## 2018-08-02 RX ORDER — LIDOCAINE HYDROCHLORIDE 10 MG/ML
0.5 INJECTION, SOLUTION EPIDURAL; INFILTRATION; INTRACAUDAL; PERINEURAL ONCE AS NEEDED
Status: DISCONTINUED | OUTPATIENT
Start: 2018-08-02 | End: 2018-08-02 | Stop reason: HOSPADM

## 2018-08-02 RX ORDER — MIDAZOLAM HYDROCHLORIDE 1 MG/ML
2 INJECTION INTRAMUSCULAR; INTRAVENOUS
Status: DISCONTINUED | OUTPATIENT
Start: 2018-08-02 | End: 2018-08-02 | Stop reason: HOSPADM

## 2018-08-02 RX ORDER — NALOXONE HCL 0.4 MG/ML
0.2 VIAL (ML) INJECTION AS NEEDED
Status: DISCONTINUED | OUTPATIENT
Start: 2018-08-02 | End: 2018-08-02 | Stop reason: HOSPADM

## 2018-08-02 RX ORDER — ONDANSETRON 4 MG/1
4 TABLET, ORALLY DISINTEGRATING ORAL EVERY 6 HOURS PRN
Status: DISCONTINUED | OUTPATIENT
Start: 2018-08-02 | End: 2018-08-04 | Stop reason: HOSPADM

## 2018-08-02 RX ORDER — PROMETHAZINE HYDROCHLORIDE 25 MG/ML
12.5 INJECTION, SOLUTION INTRAMUSCULAR; INTRAVENOUS ONCE AS NEEDED
Status: DISCONTINUED | OUTPATIENT
Start: 2018-08-02 | End: 2018-08-02 | Stop reason: HOSPADM

## 2018-08-02 RX ORDER — ROCURONIUM BROMIDE 10 MG/ML
INJECTION, SOLUTION INTRAVENOUS AS NEEDED
Status: DISCONTINUED | OUTPATIENT
Start: 2018-08-02 | End: 2018-08-02 | Stop reason: SURG

## 2018-08-02 RX ORDER — EPHEDRINE SULFATE 50 MG/ML
INJECTION, SOLUTION INTRAVENOUS AS NEEDED
Status: DISCONTINUED | OUTPATIENT
Start: 2018-08-02 | End: 2018-08-02 | Stop reason: SURG

## 2018-08-02 RX ORDER — LISINOPRIL 5 MG/1
5 TABLET ORAL DAILY
Status: DISCONTINUED | OUTPATIENT
Start: 2018-08-02 | End: 2018-08-02 | Stop reason: SDUPTHER

## 2018-08-02 RX ORDER — ALBUTEROL SULFATE 2.5 MG/3ML
2.5 SOLUTION RESPIRATORY (INHALATION) ONCE AS NEEDED
Status: DISCONTINUED | OUTPATIENT
Start: 2018-08-02 | End: 2018-08-02 | Stop reason: HOSPADM

## 2018-08-02 RX ORDER — ONDANSETRON 4 MG/1
4 TABLET, FILM COATED ORAL EVERY 6 HOURS PRN
Status: DISCONTINUED | OUTPATIENT
Start: 2018-08-02 | End: 2018-08-04 | Stop reason: HOSPADM

## 2018-08-02 RX ORDER — LIDOCAINE HYDROCHLORIDE 20 MG/ML
INJECTION, SOLUTION INFILTRATION; PERINEURAL AS NEEDED
Status: DISCONTINUED | OUTPATIENT
Start: 2018-08-02 | End: 2018-08-02 | Stop reason: SURG

## 2018-08-02 RX ORDER — OXYCODONE AND ACETAMINOPHEN 7.5; 325 MG/1; MG/1
1 TABLET ORAL ONCE AS NEEDED
Status: COMPLETED | OUTPATIENT
Start: 2018-08-02 | End: 2018-08-02

## 2018-08-02 RX ORDER — FENTANYL CITRATE 50 UG/ML
INJECTION, SOLUTION INTRAMUSCULAR; INTRAVENOUS AS NEEDED
Status: DISCONTINUED | OUTPATIENT
Start: 2018-08-02 | End: 2018-08-02 | Stop reason: SURG

## 2018-08-02 RX ORDER — FLUMAZENIL 0.1 MG/ML
0.2 INJECTION INTRAVENOUS AS NEEDED
Status: DISCONTINUED | OUTPATIENT
Start: 2018-08-02 | End: 2018-08-02 | Stop reason: HOSPADM

## 2018-08-02 RX ORDER — ONDANSETRON 2 MG/ML
4 INJECTION INTRAMUSCULAR; INTRAVENOUS EVERY 6 HOURS PRN
Status: DISCONTINUED | OUTPATIENT
Start: 2018-08-02 | End: 2018-08-04 | Stop reason: HOSPADM

## 2018-08-02 RX ORDER — SODIUM CHLORIDE 0.9 % (FLUSH) 0.9 %
1-10 SYRINGE (ML) INJECTION AS NEEDED
Status: DISCONTINUED | OUTPATIENT
Start: 2018-08-02 | End: 2018-08-02 | Stop reason: HOSPADM

## 2018-08-02 RX ORDER — CEFAZOLIN SODIUM 2 G/100ML
2 INJECTION, SOLUTION INTRAVENOUS ONCE
Status: COMPLETED | OUTPATIENT
Start: 2018-08-02 | End: 2018-08-02

## 2018-08-02 RX ADMIN — EPHEDRINE SULFATE 5 MG: 50 INJECTION INTRAMUSCULAR; INTRAVENOUS; SUBCUTANEOUS at 11:12

## 2018-08-02 RX ADMIN — FAMOTIDINE 20 MG: 10 INJECTION, SOLUTION INTRAVENOUS at 08:56

## 2018-08-02 RX ADMIN — MORPHINE SULFATE 4 MG: 4 INJECTION INTRAVENOUS at 00:15

## 2018-08-02 RX ADMIN — ASPIRIN 81 MG: 81 TABLET, DELAYED RELEASE ORAL at 15:05

## 2018-08-02 RX ADMIN — ROCURONIUM BROMIDE 20 MG: 10 INJECTION INTRAVENOUS at 11:49

## 2018-08-02 RX ADMIN — FENTANYL CITRATE 25 MCG: 50 INJECTION INTRAMUSCULAR; INTRAVENOUS at 12:25

## 2018-08-02 RX ADMIN — MORPHINE SULFATE 4 MG: 2 INJECTION, SOLUTION INTRAMUSCULAR; INTRAVENOUS at 16:42

## 2018-08-02 RX ADMIN — IOPAMIDOL 109.4 ML: 510 INJECTION, SOLUTION INTRAVASCULAR at 10:58

## 2018-08-02 RX ADMIN — DEXAMETHASONE SODIUM PHOSPHATE 4 MG: 10 INJECTION INTRAMUSCULAR; INTRAVENOUS at 10:25

## 2018-08-02 RX ADMIN — OXYCODONE HYDROCHLORIDE AND ACETAMINOPHEN 1 TABLET: 7.5; 325 TABLET ORAL at 15:01

## 2018-08-02 RX ADMIN — NEOSTIGMINE METHYLSULFATE 3 MG: 1 INJECTION INTRAMUSCULAR; INTRAVENOUS; SUBCUTANEOUS at 13:10

## 2018-08-02 RX ADMIN — CEFAZOLIN SODIUM 2 G: 2 INJECTION, SOLUTION INTRAVENOUS at 10:30

## 2018-08-02 RX ADMIN — ROCURONIUM BROMIDE 50 MG: 10 INJECTION INTRAVENOUS at 10:09

## 2018-08-02 RX ADMIN — PHENYLEPHRINE HYDROCHLORIDE 100 MCG: 10 INJECTION INTRAVENOUS at 10:41

## 2018-08-02 RX ADMIN — ATORVASTATIN CALCIUM 80 MG: 80 TABLET, FILM COATED ORAL at 20:36

## 2018-08-02 RX ADMIN — HEPARIN SODIUM 2500 UNITS: 1000 INJECTION, SOLUTION INTRAVENOUS; SUBCUTANEOUS at 11:07

## 2018-08-02 RX ADMIN — HEPARIN SODIUM 5000 UNITS: 1000 INJECTION, SOLUTION INTRAVENOUS; SUBCUTANEOUS at 11:58

## 2018-08-02 RX ADMIN — CILOSTAZOL 100 MG: 100 TABLET ORAL at 20:36

## 2018-08-02 RX ADMIN — MORPHINE SULFATE 4 MG: 2 INJECTION, SOLUTION INTRAMUSCULAR; INTRAVENOUS at 20:43

## 2018-08-02 RX ADMIN — EPHEDRINE SULFATE 5 MG: 50 INJECTION INTRAMUSCULAR; INTRAVENOUS; SUBCUTANEOUS at 10:52

## 2018-08-02 RX ADMIN — FENTANYL CITRATE 50 MCG: 50 INJECTION INTRAMUSCULAR; INTRAVENOUS at 12:33

## 2018-08-02 RX ADMIN — ROCURONIUM BROMIDE 20 MG: 10 INJECTION INTRAVENOUS at 10:53

## 2018-08-02 RX ADMIN — SODIUM CHLORIDE: 9 INJECTION, SOLUTION INTRAVENOUS at 09:59

## 2018-08-02 RX ADMIN — GLYCOPYRROLATE 0.4 MG: 0.2 INJECTION INTRAMUSCULAR; INTRAVENOUS at 13:10

## 2018-08-02 RX ADMIN — IOPAMIDOL 95 ML: 755 INJECTION, SOLUTION INTRAVENOUS at 18:00

## 2018-08-02 RX ADMIN — HYDROMORPHONE HYDROCHLORIDE 0.5 MG: 2 INJECTION INTRAMUSCULAR; INTRAVENOUS; SUBCUTANEOUS at 13:15

## 2018-08-02 RX ADMIN — ROCURONIUM BROMIDE 10 MG: 10 INJECTION INTRAVENOUS at 11:32

## 2018-08-02 RX ADMIN — FENTANYL CITRATE 25 MCG: 50 INJECTION INTRAMUSCULAR; INTRAVENOUS at 11:33

## 2018-08-02 RX ADMIN — FENTANYL CITRATE 100 MCG: 50 INJECTION INTRAMUSCULAR; INTRAVENOUS at 08:56

## 2018-08-02 RX ADMIN — FENTANYL CITRATE 50 MCG: 50 INJECTION INTRAMUSCULAR; INTRAVENOUS at 10:05

## 2018-08-02 RX ADMIN — PROTAMINE SULFATE 50 MG: 10 INJECTION, SOLUTION INTRAVENOUS at 13:12

## 2018-08-02 RX ADMIN — LIDOCAINE HYDROCHLORIDE 100 MG: 20 INJECTION, SOLUTION INFILTRATION; PERINEURAL at 10:09

## 2018-08-02 RX ADMIN — MIDAZOLAM 2 MG: 1 INJECTION INTRAMUSCULAR; INTRAVENOUS at 08:56

## 2018-08-02 RX ADMIN — EPHEDRINE SULFATE 5 MG: 50 INJECTION INTRAMUSCULAR; INTRAVENOUS; SUBCUTANEOUS at 11:42

## 2018-08-02 RX ADMIN — LISINOPRIL 5 MG: 10 TABLET ORAL at 15:08

## 2018-08-02 RX ADMIN — FENTANYL CITRATE 25 MCG: 50 INJECTION INTRAMUSCULAR; INTRAVENOUS at 11:49

## 2018-08-02 RX ADMIN — SODIUM CHLORIDE, POTASSIUM CHLORIDE, SODIUM LACTATE AND CALCIUM CHLORIDE 9 ML/HR: 600; 310; 30; 20 INJECTION, SOLUTION INTRAVENOUS at 08:57

## 2018-08-02 RX ADMIN — OXYCODONE HYDROCHLORIDE AND ACETAMINOPHEN 1 TABLET: 5; 325 TABLET ORAL at 18:44

## 2018-08-02 RX ADMIN — ONDANSETRON 4 MG: 2 INJECTION INTRAMUSCULAR; INTRAVENOUS at 13:05

## 2018-08-02 RX ADMIN — HEPARIN SODIUM 2500 UNITS: 1000 INJECTION, SOLUTION INTRAVENOUS; SUBCUTANEOUS at 10:56

## 2018-08-02 RX ADMIN — EPHEDRINE SULFATE 5 MG: 50 INJECTION INTRAMUSCULAR; INTRAVENOUS; SUBCUTANEOUS at 11:23

## 2018-08-02 RX ADMIN — METOPROLOL TARTRATE 12.5 MG: 25 TABLET ORAL at 20:36

## 2018-08-02 RX ADMIN — ACETAMINOPHEN 650 MG: 325 TABLET, FILM COATED ORAL at 06:07

## 2018-08-02 RX ADMIN — FENTANYL CITRATE 25 MCG: 50 INJECTION INTRAMUSCULAR; INTRAVENOUS at 11:53

## 2018-08-02 RX ADMIN — HEPARIN SODIUM 7500 UNITS: 1000 INJECTION, SOLUTION INTRAVENOUS; SUBCUTANEOUS at 10:45

## 2018-08-02 RX ADMIN — EPHEDRINE SULFATE 5 MG: 50 INJECTION INTRAMUSCULAR; INTRAVENOUS; SUBCUTANEOUS at 10:43

## 2018-08-02 RX ADMIN — PHENYLEPHRINE HYDROCHLORIDE 100 MCG: 10 INJECTION INTRAVENOUS at 10:35

## 2018-08-02 RX ADMIN — SODIUM CHLORIDE 125 ML/HR: 9 INJECTION, SOLUTION INTRAVENOUS at 16:33

## 2018-08-02 RX ADMIN — ROCURONIUM BROMIDE 20 MG: 10 INJECTION INTRAVENOUS at 12:25

## 2018-08-02 RX ADMIN — PROPOFOL 200 MG: 10 INJECTION, EMULSION INTRAVENOUS at 10:09

## 2018-08-02 RX ADMIN — HEPARIN SODIUM 2500 UNITS: 1000 INJECTION, SOLUTION INTRAVENOUS; SUBCUTANEOUS at 11:27

## 2018-08-02 NOTE — ANESTHESIA PROCEDURE NOTES
Airway  Urgency: elective    Airway not difficult    General Information and Staff    Patient location during procedure: OR  Anesthesiologist: KAL TAN  CRNA: PATEL CERDA    Indications and Patient Condition  Indications for airway management: airway protection    Preoxygenated: yes (pt pre-O2 with 100% O2)  MILS not maintained throughout  Mask difficulty assessment: 2 - vent by mask + OA or adjuvant +/- NMBA    Final Airway Details  Final airway type: endotracheal airway      Successful airway: ETT  Cuffed: yes   Successful intubation technique: direct laryngoscopy  Facilitating devices/methods: anterior pressure/BURP and Bougie  Endotracheal tube insertion site: oral  Blade: Bianca  Blade size: #3  ETT size: 7.5 mm  Cormack-Lehane Classification: grade IIa - partial view of glottis  Placement verified by: chest auscultation and capnometry   Measured from: lips  ETT to lips (cm): 22  Number of attempts at approach: 1    Additional Comments  ATOETx1. No change in dentition.

## 2018-08-02 NOTE — ED TRIAGE NOTES
Pt reports lower central abd pain yesterday. Denies urinary symptoms. Pt is currently being treated for a anuerysm.

## 2018-08-02 NOTE — ANESTHESIA POSTPROCEDURE EVALUATION
"Patient: Sylvain Amador    Procedure Summary     Date:  08/02/18 Room / Location:  [facility] OR [address] [place] [address] MAIN OR    Anesthesia Start:  0959 Anesthesia Stop:  1341    Procedure:  AORTIC AND LEFT ILIAC ARTERY ANEURYSM REPAIR (Left ) Diagnosis:  (AORTIC ANEURYSM)    Surgeon:  Arturo Huynh MD Provider:  Harvey Mike MD    Anesthesia Type:  general ASA Status:  3          Anesthesia Type: general  Last vitals  BP   134/78 (08/02/18 1508)   Temp   36.6 °C (97.8 °F) (08/02/18 1333)   Pulse   60 (08/02/18 1508)   Resp   16 (08/02/18 1510)     SpO2   96 % (08/02/18 1450)     Post Anesthesia Care and Evaluation    Patient location during evaluation: bedside  Patient participation: complete - patient participated  Level of consciousness: awake and alert  Pain management: adequate  Airway patency: patent  Anesthetic complications: No anesthetic complications    Cardiovascular status: acceptable  Respiratory status: acceptable  Hydration status: acceptable    Comments: /78   Pulse 60   Temp 36.6 °C (97.8 °F) (Oral)   Resp 16   Ht 182.9 cm (72\")   Wt 92.4 kg (203 lb 9.6 oz)   SpO2 96%   BMI 27.61 kg/m²       "

## 2018-08-02 NOTE — ED NOTES
"Pt c/o lower abdominal pain with occasional pain in his lower back.  Denies n/v, diarrhea, urgency, frequency or any difficulty urinating.  States he called his PCP and the nurse with the answering service advised that he come to the hospital r/t having an abdominal anuerysm.  \"My best friend bled out and  last year from an anuerysm so I just want to be sure\"     Jacqui Barron RN  [phone]    "

## 2018-08-02 NOTE — PROGRESS NOTES
Came up to see patient because of increased abdominal pain.  He presented with abdominal pain although I didn't think it was the aneurysm necessarily I didn't think we could delay repair.  He complains of moderate diffuse abdominal pain that radiates to the back.  This is a similar description he gave prior to surgery but he says it's actually worse now.  On exam, I also noticed that he has had mottling of the toes.  I did not expect this with bilateral chronic SFA occlusions but he did have a large amount of mural thrombus in his aneurysm and likely shower this through collaterals down to the digits.  His feet are warm and he does have good Doppler signals so hopefully he will not progressed any sort of tissue loss but I did discuss that today with the patient and the family.  He is already on aspirin and Pletal.    Because of his abdominal pain, I think CT angiography is appropriate.  This will give him another contrast load today which I wanted to avoid for think it's relatively-year-old low risk and if not done could miss something major.

## 2018-08-02 NOTE — ANESTHESIA PROCEDURE NOTES
Arterial Line    Patient location during procedure: holding area  Start time: 8/2/2018 8:40 AM  Stop Time:8/2/2018 8:45 AM       Line placed for hemodynamic monitoring.  Performed By   Anesthesiologist: MARY GUNTER  Preanesthetic Checklist  Completed: patient identified, site marked, surgical consent, pre-op evaluation, timeout performed, IV checked, risks and benefits discussed and monitors and equipment checked  Arterial Line Prep   Sterile Tech: mask and cap  Prep: ChloraPrep  Patient monitoring: blood pressure monitoring, continuous pulse oximetry and EKG  Arterial Line Procedure   Laterality:right  Location:  radial artery  Catheter size: 20 G   Guidance: landmark technique  Number of attempts: 1  Successful placement: yes          Post Assessment   Dressing Type: occlusive dressing applied, secured with tape and wrist guard applied.   Complications no  Circ/Move/Sens Assessment: unchanged.   Patient Tolerance: patient tolerated the procedure well with no apparent complications

## 2018-08-02 NOTE — H&P
Name: Sylvain Amador ADMIT: 2018   : 1960  PCP: Justina Smith APRN    MRN: [id number] LOS: 0 days   AGE/SEX: 57 y.o. male  ROOM:      Consults/H&P  Arturo Huynh MD     LOS: 0 days   Patient Care Team:  Justina Smith APRN as PCP - General  Provider, No Known as PCP - Family Medicine    Subjective     History of Present Illness  57 y.o. male gentleman with a known history of abdominal aortic aneurysm and peripheral vascular disease who presents to the emergency room at the take 2 day history of lower abdominal pain that began to radiate to the back.  He was seen once in my office back 2 or 3 years ago for the same problem.  He was trying to quit smoking and making some success but has subsequently restarted.  He denies any fever, sweats, dysuria, or leg pain.  No personal history of DVT.  No family history of aneurysmal disease.    HPI Elements: Patient complains of abdominal aortic aneurysm. Problem is located mainly in the low abd. The pain is described as aching, and is 2/10 in intensity. Pain is intermittent. Onset was 2 days ago. Symptoms have been unchanged since.  The patient's risks factors for peripheral vascular disease include arteriosclerotic heart disease, peripheral vascular disease and smoking.  The patient denies a history of diabetes mellitus    Review of Systems    PMH: Coronary artery disease, hyperlipidemia, hypertension, peripheral arterial disease, abdominal aortic aneurysm     Family History: No history of DVT, venous embolism, or aneurysmal disease.    Social History: Patient smokes a half pack per day and denies significant alcohol or drug use    Allergies: No known drug allergy      Objective   Temp:  [98.7 °F (37.1 °C)] 98.7 °F (37.1 °C)  Heart Rate:  [94] 94  Resp:  [16] 16  BP: (129)/(87) 129/87    No intake/output data recorded.    Physical Exam   Constitutional: He is oriented to person, place, and time. He appears well-developed and  well-nourished.   HENT:   Right Ear: External ear normal.   Left Ear: External ear normal.   Nose: Nose normal.   Mouth/Throat: Normal dentition.   Eyes: Pupils are equal, round, and reactive to light. Conjunctivae, EOM and lids are normal.   Neck: No JVD present. Carotid bruit is not present. No thyromegaly present.   Cardiovascular: Normal rate, regular rhythm and normal heart sounds.    No murmur heard.  Pulses:       Carotid pulses are 2+ on the right side, and 2+ on the left side.       Radial pulses are 2+ on the right side, and 2+ on the left side.   No peripheral edema.   Pulmonary/Chest: Effort normal and breath sounds normal.   Abdominal: Soft. He exhibits no distension. There is no hepatosplenomegaly. There is no tenderness.   Abdominal aorta is palpable but nontender.   Musculoskeletal:   Gait not examined. No bony tenderness or deformities.  No clubbing or cyanosis.     Neurological: He is alert and oriented to person, place, and time.   Skin: Skin is warm, dry and intact.   Psychiatric: He has a normal mood and affect. His behavior is normal. Judgment normal.   Vitals reviewed.        Results from last 7 days  Lab Units 08/01/18  2112   WBC 10*3/mm3 10.35   HEMOGLOBIN g/dL 15.9   PLATELETS 10*3/mm3 150     Results from last 7 days  Lab Units 08/01/18  2112   SODIUM mmol/L 138   POTASSIUM mmol/L 3.9   CHLORIDE mmol/L 102   CO2 mmol/L 22.7   BUN mg/dL 11   CREATININE mg/dL 0.85   GLUCOSE mg/dL 107*   Estimated Creatinine Clearance: 122.8 mL/min (by C-G formula based on SCr of 0.85 mg/dL).      Data Reviewed:    Imaging Studies:  CT angiogram reviewed.  Aortic diameter at the level of the left renal measures 28 mm.  It looks like there is some thrombus in the neck that is not entirely circumferential but significant.  Diameter at 15 mm down is about 26 mm still with some thrombus but otherwise the neck is relatively long.  The right common iliac artery measures about 20 mm with thrombus.  Left common  iliac artery actually goes out to 28-30 mm.  Access via the externals looked good with diameters in the 9-10 range.    Records Summarized:  Office notes from my visit with him 2-3 years ago reviewed.  In summary, At that time he had bilateral SFA occlusions and about a 3 cm infrarenal abdominal aortic aneurysm      Active Hospital Problems    Diagnosis Date Noted   • Abdominal aortic aneurysm (AAA) without rupture (CMS/HCC) [I71.4] 2018      Resolved Hospital Problems    Diagnosis Date Noted Date Resolved   No resolved problems to display.     Problem Points:  4:  Patient has a new problem, with additional work-up planned  Total problem points:4 or more    Data Points:  1:  I have reviewed or order clinical lab test  1:  I have reviewed or order radiology test (except heart catheterization or echo)  1:  I have reviewed or order medicine test (PFT, EKG, caridac echo or cath)  2:  I have personally and independently review of image, tracing, or specimen  2:  I have reviewed and summation of old records and/or discussed the patients care with another health care provider  Total data points:4 or more    Risk Points:  High:  Major elective surgery with risk factors or emergent surgery    [place]    Assessment/Plan     Active Problems:    Abdominal aortic aneurysm (AAA) without rupture (CMS/HCC)        57 y.o. male with abdominal aortic aneurysm that measures about 4.5 cm in size.  The patient presents with about 2 days of abdominal discomfort in the lower abdomen radiating now to the back.  His basically resolved now.  CT angiogram was done with 3 mm cuts.  Findings discussed above but his aneurysm has enlarged over the last 2 years where it previously measured 3.5 cm.  There is no evidence of rupture, stranding, or other high risk signs.  On exam, I can feel his aneurysm and it does not feel tender.  However, there is no other etiology for his abdominal pain that would make me feel comfortable discharging  this patient.  I recommended that he be admitted to the hospital with a subsequent repair tomorrow if nothing else changes.  The thrombus does increase his risk for the procedure because of either emboli to the lower extremities or to the visceral vessels or potentially compromising seal although I do think he has adequate anatomy for successful seal.  He may require an iliac branch device on the left because of a left common iliac artery aneurysm in order to preserve flow to the left internal iliac as well.  This was discussed with the patient and his wife at the bedside this evening in the emergency room.  They understand the risks, benefits, and alternatives    I discussed the patients findings and my recommendations with patient, family and consulting provider.  Please call my office with any question: [phone]    Arturo Huynh MD  08/01/18  11:39 PM

## 2018-08-02 NOTE — PLAN OF CARE
Problem: Patient Care Overview  Goal: Plan of Care Review  Outcome: Ongoing (interventions implemented as appropriate)   08/02/18 1639   Coping/Psychosocial   Plan of Care Reviewed With patient   OTHER   Outcome Summary patient vss. pt complains of pain to abdomen and back. Dr. Huynh ordering iv pain meds and CT of abdomen. will monitor.   Plan of Care Review   Progress no change       Problem: Pain, Acute (Adult)  Goal: Acceptable Pain Control/Comfort Level  Outcome: Ongoing (interventions implemented as appropriate)   08/02/18 1639   Pain, Acute (Adult)   Acceptable Pain Control/Comfort Level making progress toward outcome       Problem: Fall Risk (Adult)  Goal: Absence of Fall  Outcome: Ongoing (interventions implemented as appropriate)   08/02/18 1639   Fall Risk (Adult)   Absence of Fall making progress toward outcome       Problem: Skin Injury Risk (Adult)  Goal: Skin Health and Integrity  Outcome: Ongoing (interventions implemented as appropriate)   08/02/18 1639   Skin Injury Risk (Adult)   Skin Health and Integrity making progress toward outcome

## 2018-08-02 NOTE — RESEARCH
EMERGENT SURGERY    Unfit for open AAA repair:  No    Maximum AAA Diameter:  4.6 cm    Aortic Neck Length (Lowest renal to area where neck has increased by 10%):  20 mm    Aortic Neck Diameter (Largest outer Wall to outer wall in seal zone):  28 mm    Aorta-Neck Angle (Max angle between axis of suprarenal aorta and neck):  <45    Neck-AAA Angle (Maximum angle between axis of aneurysm neck and proximal portion of sac:  <45    Anesthesia:General    Skin Prep: Chlorhexidine with alcohol (ChloroPrep)    Iodinated Contrast:   See report    Fluroscopy Time:   See report    Specimen collected:  None    Access Right  Percutaneious femoral (ultrasound guided)    If Open femoral, failed percutaneous:  N/A  Iliac adjunct:  None    Access Left  Percutaneious femoral (ultrasound guided)    If Open femoral, failed percutaneous:  N/A  Iliac adjunct:  None

## 2018-08-02 NOTE — PLAN OF CARE
Problem: Patient Care Overview  Goal: Plan of Care Review  Outcome: Ongoing (interventions implemented as appropriate)   08/02/18 0208   Coping/Psychosocial   Plan of Care Reviewed With patient   OTHER   Outcome Summary VSS; patient spoke with Dr. Huynh, plan is for surgery later today for AAA repair- consent signed. Pt. calm and cooperative, sleeping well. Will continue to monitor.       Problem: Pain, Acute (Adult)  Goal: Identify Related Risk Factors and Signs and Symptoms  Outcome: Outcome(s) achieved Date Met: 08/02/18    Goal: Acceptable Pain Control/Comfort Level  Outcome: Ongoing (interventions implemented as appropriate)

## 2018-08-02 NOTE — PAYOR COMM NOTE
"UR CONTACT:   TYREL           P: [phone]  F: [phone]    IN SURGERY AT THIS TIME        Sylvain Amador (57 y.o. Male)     Date of Birth Social Security Number Address Home Phone MRN    1960  [id number] [place] [address] [phone] [id number]    [employer] Marital Status          None        Admission Date Admission Type Admitting Provider Attending Provider Department, Room/Bed    18 Emergency Arturo Huynh MD Thomas, Bradley Glynn, MD [facility] MAIN OR, MARIANO Main OR/MAIN OR    Discharge Date Discharge Disposition Discharge Destination                       Attending Provider:  Arturo Huynh MD    Allergies:  No Known Drug Allergy    Isolation:  None   Infection:  None   Code Status:  CPR    Ht:  182.9 cm (72\")   Wt:  92.4 kg (203 lb 9.6 oz)    Admission Cmt:  None   Principal Problem:  None                Active Insurance as of 2018     Primary Coverage     Payor Plan Insurance Group Employer/Plan Group    [employer] EM[employer]OYEE [id number]     Payor Plan Address Payor Plan Phone Number Effective From Effective To    [address] [phone] 10/1/2015     [facility] [address]       Subscriber Name Subscriber Birth Date Member ID       SYLVAIN AMADOR 1960 [id number]                 Emergency Contacts      (Rel.) Home Phone Work Phone Mobile Phone    Germania Hollins (Spouse) [phone] -- [phone]               History & Physical      Arturo Huynh MD at 2018 11:39 PM          Name: Sylvain Amador ADMIT: 2018   : 1960  PCP: Justina Smith APRN    MRN: [id number] LOS: 0 days   AGE/SEX: 57 y.o. male  ROOM:      Consults/H&P  Arturo Huynh MD     LOS: 0 days   Patient Care Team:  Justina Smith APRN as PCP - General  Provider, No Known as PCP - Family Medicine    Subjective     History of Present Illness  57 y.o. male " gentleman with a known history of abdominal aortic aneurysm and peripheral vascular disease who presents to the emergency room at the take 2 day history of lower abdominal pain that began to radiate to the back.  He was seen once in my office back 2 or 3 years ago for the same problem.  He was trying to quit smoking and making some success but has subsequently restarted.  He denies any fever, sweats, dysuria, or leg pain.  No personal history of DVT.  No family history of aneurysmal disease.    HPI Elements: Patient complains of abdominal aortic aneurysm. Problem is located mainly in the low abd. The pain is described as aching, and is 2/10 in intensity. Pain is intermittent. Onset was 2 days ago. Symptoms have been unchanged since.  The patient's risks factors for peripheral vascular disease include arteriosclerotic heart disease, peripheral vascular disease and smoking.  The patient denies a history of diabetes mellitus    Review of Systems    PMH: Coronary artery disease, hyperlipidemia, hypertension, peripheral arterial disease, abdominal aortic aneurysm     Family History: No history of DVT, venous embolism, or aneurysmal disease.    Social History: Patient smokes a half pack per day and denies significant alcohol or drug use    Allergies: No known drug allergy      Objective   Temp:  [98.7 °F (37.1 °C)] 98.7 °F (37.1 °C)  Heart Rate:  [94] 94  Resp:  [16] 16  BP: (129)/(87) 129/87    No intake/output data recorded.    Physical Exam   Constitutional: He is oriented to person, place, and time. He appears well-developed and well-nourished.   HENT:   Right Ear: External ear normal.   Left Ear: External ear normal.   Nose: Nose normal.   Mouth/Throat: Normal dentition.   Eyes: Pupils are equal, round, and reactive to light. Conjunctivae, EOM and lids are normal.   Neck: No JVD present. Carotid bruit is not present. No thyromegaly present.   Cardiovascular: Normal rate, regular rhythm and normal heart sounds.    No  murmur heard.  Pulses:       Carotid pulses are 2+ on the right side, and 2+ on the left side.       Radial pulses are 2+ on the right side, and 2+ on the left side.   No peripheral edema.   Pulmonary/Chest: Effort normal and breath sounds normal.   Abdominal: Soft. He exhibits no distension. There is no hepatosplenomegaly. There is no tenderness.   Abdominal aorta is palpable but nontender.   Musculoskeletal:   Gait not examined. No bony tenderness or deformities.  No clubbing or cyanosis.     Neurological: He is alert and oriented to person, place, and time.   Skin: Skin is warm, dry and intact.   Psychiatric: He has a normal mood and affect. His behavior is normal. Judgment normal.   Vitals reviewed.        Results from last 7 days  Lab Units 08/01/18  2112   WBC 10*3/mm3 10.35   HEMOGLOBIN g/dL 15.9   PLATELETS 10*3/mm3 150     Results from last 7 days  Lab Units 08/01/18  2112   SODIUM mmol/L 138   POTASSIUM mmol/L 3.9   CHLORIDE mmol/L 102   CO2 mmol/L 22.7   BUN mg/dL 11   CREATININE mg/dL 0.85   GLUCOSE mg/dL 107*   Estimated Creatinine Clearance: 122.8 mL/min (by C-G formula based on SCr of 0.85 mg/dL).      Data Reviewed:    Imaging Studies:  CT angiogram reviewed.  Aortic diameter at the level of the left renal measures 28 mm.  It looks like there is some thrombus in the neck that is not entirely circumferential but significant.  Diameter at 15 mm down is about 26 mm still with some thrombus but otherwise the neck is relatively long.  The right common iliac artery measures about 20 mm with thrombus.  Left common iliac artery actually goes out to 28-30 mm.  Access via the externals looked good with diameters in the 9-10 range.    Records Summarized:  Office notes from my visit with him 2-3 years ago reviewed.  In summary, At that time he had bilateral SFA occlusions and about a 3 cm infrarenal abdominal aortic aneurysm      Active Hospital Problems    Diagnosis Date Noted   • Abdominal aortic aneurysm  (AAA) without rupture (CMS/[facility]) [I71.4] 2018      Resolved Hospital Problems    Diagnosis Date Noted Date Resolved   No resolved problems to display.     Problem Points:  4:  Patient has a new problem, with additional work-up planned  Total problem points:4 or more    Data Points:  1:  I have reviewed or order clinical lab test  1:  I have reviewed or order radiology test (except heart catheterization or echo)  1:  I have reviewed or order medicine test (PFT, EKG, caridac echo or cath)  2:  I have personally and independently review of image, tracing, or specimen  2:  I have reviewed and summation of old records and/or discussed the patients care with another health care provider  Total data points:4 or more    Risk Points:  High:  Major elective surgery with risk factors or emergent surgery    [place]    Assessment/Plan     Active Problems:    Abdominal aortic aneurysm (AAA) without rupture (CMS/[facility])        57 y.o. male with abdominal aortic aneurysm that measures about 4.5 cm in size.  The patient presents with about 2 days of abdominal discomfort in the lower abdomen radiating now to the back.  His basically resolved now.  CT angiogram was done with 3 mm cuts.  Findings discussed above but his aneurysm has enlarged over the last 2 years where it previously measured 3.5 cm.  There is no evidence of rupture, stranding, or other high risk signs.  On exam, I can feel his aneurysm and it does not feel tender.  However, there is no other etiology for his abdominal pain that would make me feel comfortable discharging this patient.  I recommended that he be admitted to the hospital with a subsequent repair tomorrow if nothing else changes.  The thrombus does increase his risk for the procedure because of either emboli to the lower extremities or to the visceral vessels or potentially compromising seal although I do think he has adequate anatomy for successful seal.  He may require an iliac branch device on  "the left because of a left common iliac artery aneurysm in order to preserve flow to the left internal iliac as well.  This was discussed with the patient and his wife at the bedside this evening in the emergency room.  They understand the risks, benefits, and alternatives    I discussed the patients findings and my recommendations with patient, family and consulting provider.  Please call my office with any question: [phone]    Arturo Huynh MD  08/01/18  11:39 PM              Electronically signed by Arturo Huynh MD at 8/1/2018 11:48 PM          Emergency Department Notes      Dorian Cruz RN at 8/1/2018  8:45 PM        Pt reports lower central abd pain yesterday. Denies urinary symptoms. Pt is currently being treated for a anuerysm.     Electronically signed by Dorian Cruz RN at 8/1/2018  8:45 PM     Franklin Cameron MD at 8/1/2018  9:37 PM           EMERGENCY DEPARTMENT ENCOUNTER    Room Number:  30/30  Date seen:  8/2/2018  Time seen: 9:37 PM  PCP: BELKIS Patton  Historian: Patient, Family      HPI:  Chief Complaint: Abdominal Pain  Context: Sylvain Amador is a 57 y.o. male. Pt reports that he has a known 3.5 cm infrarenal aortic aneurysm, but is not currently being followed by a provider for this. Pt presents to the ED c/o intermittent episodes of periumbilical abdominal pain radiating to the lower back onset yesterday. Pt reports that his abdominal pain has improved since initial onset. Pt denies N/V/D, chest pain, dyspnea, dysuria, bowel/bladder incontinence, motor/sensory deficits, saddle anesthesia, urinary retention, and documented fever. There are no other complaints at this time.     Pain Location: Periumbilical region of the abdomen  Radiation: Lower back  Quality: \"aching\"  Intensity/Severity: Moderate  Duration: Onset yesterday  Onset quality: Gradual in onset  Timing: Intermittent  Progression: Improving  Aggravating Factors: Nothing  Alleviating " Factors: Nothing  Previous Episodes: None  Treatment before arrival: Unknown  Associated Symptoms: None        MEDICAL RECORD REVIEW    Pt had a CTA Abdomen performed in 2014 that showed:  1. 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  2. Fusiform infrarenal aortic aneurysm (maximum diameter 3.5 cm)            PAST MEDICAL HISTORY  Active Ambulatory Problems     Diagnosis Date Noted   • Coronary atherosclerosis of native coronary vessel    • Hyperlipidemia    • Hypertension    • PAD (peripheral artery disease) (CMS/HCC)      Resolved Ambulatory Problems     Diagnosis Date Noted   • No Resolved Ambulatory Problems     Past Medical History:   Diagnosis Date   • Atherosclerosis of native coronary artery of native heart without angina pectoris    • CAD (coronary artery disease)    • Chest pain    • Coronary atherosclerosis of native coronary vessel    • Hyperlipidemia    • Hypertension    • Intermittent claudication (CMS/HCC)    • PAD (peripheral artery disease) (CMS/HCC)          PAST SURGICAL HISTORY  Past Surgical History:   Procedure Laterality Date   • VASECTOMY           FAMILY HISTORY  Family History   Problem Relation Age of Onset   • Diabetes Mother    • Hypertension Mother    • Pancreatic cancer Mother    • Cancer Father    • Cancer Brother          SOCIAL HISTORY  Social History     Social History   • Marital status:      Spouse name: N/A   • Number of children: N/A   • Years of education: N/A     Occupational History   • Not on file.     Social History Main Topics   • Smoking status: Current Every Day Smoker     Packs/day: 0.50   • Smokeless tobacco: Former User      Comment: caffeine use   • Alcohol use No   • Drug use: No   • Sexual activity: Not on file     Other Topics Concern   • Not on file     Social History Narrative   • No narrative on file         ALLERGIES  No known drug allergy        REVIEW OF  SYSTEMS  Review of Systems   Constitutional: Negative for chills and fever (pt denies documented fever).   HENT: Negative for congestion, rhinorrhea and sore throat.    Eyes: Negative for pain.   Respiratory: Negative for cough and shortness of breath.    Cardiovascular: Negative for chest pain, palpitations and leg swelling.   Gastrointestinal: Positive for abdominal pain (radiating to the lower back). Negative for diarrhea, nausea and vomiting.   Genitourinary: Negative for difficulty urinating, dysuria, flank pain and frequency.   Musculoskeletal: Positive for back pain (associated with abdominal pain). Negative for myalgias, neck pain and neck stiffness.   Skin: Negative for rash.   Neurological: Negative for dizziness, speech difficulty, weakness, light-headedness, numbness and headaches.   Psychiatric/Behavioral: Negative.    All other systems reviewed and are negative.           PHYSICAL EXAM  ED Triage Vitals   Temp Heart Rate Resp BP SpO2   08/01/18 2046 08/01/18 2046 08/01/18 2046 08/01/18 2107 08/01/18 2046   98.7 °F (37.1 °C) 94 16 129/87 98 %      Temp src Heart Rate Source Patient Position BP Location FiO2 (%)   08/01/18 2046 08/01/18 2046 08/01/18 2107 08/01/18 2107 --   Tympanic Monitor Sitting Left arm        Physical Exam   Constitutional: He is oriented to person, place, and time. No distress.   HENT:   Head: Normocephalic.   Mouth/Throat: Mucous membranes are normal.   Eyes: Pupils are equal, round, and reactive to light. EOM are normal.   Neck: Normal range of motion. Neck supple.   Cardiovascular: Normal rate, regular rhythm and normal heart sounds.    Pulmonary/Chest: Effort normal and breath sounds normal. No respiratory distress. He has no decreased breath sounds. He has no wheezes. He has no rhonchi. He has no rales.   Abdominal: Soft. He exhibits mass (lower abdomen - pulsatile). There is tenderness in the suprapubic area. There is no rebound and no guarding.   Musculoskeletal: Normal  range of motion.   Neurological: He is alert and oriented to person, place, and time. He has normal sensation.   Skin: Skin is warm and dry.   Psychiatric: Mood and affect normal.   Nursing note and vitals reviewed.          LAB RESULTS  Recent Results (from the past 24 hour(s))   Comprehensive Metabolic Panel    Collection Time: 08/01/18  9:12 PM   Result Value Ref Range    Glucose 107 (H) 65 - 99 mg/dL    BUN 11 6 - 20 mg/dL    Creatinine 0.85 0.76 - 1.27 mg/dL    Sodium 138 136 - 145 mmol/L    Potassium 3.9 3.5 - 5.2 mmol/L    Chloride 102 98 - 107 mmol/L    CO2 22.7 22.0 - 29.0 mmol/L    Calcium 8.9 8.6 - 10.5 mg/dL    Total Protein 7.7 6.0 - 8.5 g/dL    Albumin 4.50 3.50 - 5.20 g/dL    ALT (SGPT) 15 1 - 41 U/L    AST (SGOT) 10 1 - 40 U/L    Alkaline Phosphatase 70 39 - 117 U/L    Total Bilirubin 0.3 0.1 - 1.2 mg/dL    eGFR Non African Amer 92 >60 mL/min/1.73    Globulin 3.2 gm/dL    A/G Ratio 1.4 g/dL    BUN/Creatinine Ratio 12.9 7.0 - 25.0    Anion Gap 13.3 mmol/L   Urinalysis With Microscopic If Indicated (No Culture) - Urine, Clean Catch    Collection Time: 08/01/18  9:12 PM   Result Value Ref Range    Color, UA Yellow Yellow, Straw    Appearance, UA Clear Clear    pH, UA 6.0 5.0 - 8.0    Specific Gravity, UA 1.011 1.005 - 1.030    Glucose, UA Negative Negative    Ketones, UA Negative Negative    Bilirubin, UA Negative Negative    Blood, UA Negative Negative    Protein, UA Negative Negative    Leuk Esterase, UA Negative Negative    Nitrite, UA Negative Negative    Urobilinogen, UA 0.2 E.U./dL 0.2 - 1.0 E.U./dL   CBC Auto Differential    Collection Time: 08/01/18  9:12 PM   Result Value Ref Range    WBC 10.35 4.50 - 10.70 10*3/mm3    RBC 5.12 4.60 - 6.00 10*6/mm3    Hemoglobin 15.9 13.7 - 17.6 g/dL    Hematocrit 46.8 40.4 - 52.2 %    MCV 91.4 79.8 - 96.2 fL    MCH 31.1 27.0 - 32.7 pg    MCHC 34.0 32.6 - 36.4 g/dL    RDW 13.9 11.5 - 14.5 %    RDW-SD 46.6 37.0 - 54.0 fl    MPV 11.0 6.0 - 12.0 fL    Platelets  150 140 - 500 10*3/mm3    Neutrophil % 63.6 42.7 - 76.0 %    Lymphocyte % 27.9 19.6 - 45.3 %    Monocyte % 7.1 5.0 - 12.0 %    Eosinophil % 1.1 0.3 - 6.2 %    Basophil % 0.3 0.0 - 1.5 %    Immature Grans % 0.3 0.0 - 0.5 %    Neutrophils, Absolute 6.59 1.90 - 8.10 10*3/mm3    Lymphocytes, Absolute 2.89 0.90 - 4.80 10*3/mm3    Monocytes, Absolute 0.73 0.20 - 1.20 10*3/mm3    Eosinophils, Absolute 0.11 0.00 - 0.70 10*3/mm3    Basophils, Absolute 0.03 0.00 - 0.20 10*3/mm3    Immature Grans, Absolute 0.03 0.00 - 0.03 10*3/mm3    nRBC 0.0 0.0 - 0.0 /100 WBC       Ordered the above labs and reviewed the results.        RADIOLOGY  CT Abdomen Pelvis With Contrast   Final Result   IMPRESSION :    1. Renal findings as discussed.   2. Extensive colonic diverticulosis.   3. Enlarging aortoiliac aneurysms as discussed, no leak or rupture      FINDINGS ABDOMEN CT:  There is some focal atrophy of the lower pole  right kidney with diminished enhancement which appears chronic but has  developed since the comparison examination. Left renal cyst has enlarged  slightly now measuring 3.7 cm, previously 3.1 cm. Remaining solid organs  have an unremarkable appearance. Normal appendix. There is extensive  colonic diverticulosis. The GI tract not opacified for assessment but  non obstructive in appearance. No ascites.     FINDINGS PELVIS CT:  Infrarenal aortic aneurysm has increased in size  now measuring 4.5 x 4.4 cm, previously 3.5 x 3.3 cm. As before, it  involves the bifurcation and iliacs, the left common iliac now measuring  3 cm, previously 2 cm. Right common iliac now measuring 2.5 cm,  previously 2.0 cm. Extensive plaque is present within the aneurysmal  segments but no evidence of leak or rupture at this time.           This report was finalized on 8/1/2018 10:15 PM by Carlton Rios M.D.                 Ordered the above noted radiological studies. Reviewed by me in PACS.            PROCEDURES  Procedures        MEDICATIONS GIVEN  IN ER  Medications   sodium chloride 0.9 % flush 10 mL (not administered)   morphine injection 4 mg (4 mg Intravenous Given 8/1/18 2115)   ondansetron (ZOFRAN) injection 4 mg (4 mg Intravenous Given 8/1/18 2115)   sodium chloride 0.9 % bolus 1,000 mL (1,000 mL Intravenous New Bag 8/1/18 2155)   iopamidol (ISOVUE-300) 61 % injection 100 mL (85 mL Intravenous Given 8/1/18 2203)             PROGRESS AND CONSULTS  ED Course as of Aug 02 0013   Wed Aug 01, 2018   2107 Pt is a 57 yom with a cc lower abdominal pain onset yesterday now radiating ot his back. Constant. No associated nausea,vomiting or diarrhea. No urinary complaints. Has a known 4cm AAA which was last imaged about 2 years ago. He called the nurse on his insurance card who advised him to checked out.   [JS]   2338 11:38 PM  Patient with lower abdominal pain and appears to have AAA as well as iliac aneurysm.  Discussed with Dr. Huynh who is here seeing patient and will admit for operative repair.  [SL]      ED Course User Index  [JS] BELKIS Lopez  [SL] Franklin Cameron MD     9:48 PM:  IV fluids ordered to hydrate pt. Morphine and Zofran ordered to treat for abdominal pain. CT Abd, blood work, and UA have been ordered for further evaluation.    10:18 PM:  Rechecked pt. Pt is resting comfortably and appears in no acute distress. Informed pt that his UA is unremarkable. WBC is WNL. Pt's CT Abd shows that pt's infrarenal aortic aneurysm has increased in size from 3.5 x 3.3 cm and now measures 4.5 x 4.4 cm. There is no leak or rupture present currently. Will discuss further course of care with the vascular surgeon. Pt agrees with plan.    10:24 PM:  Placed call to the vascular surgeon to discuss further course of care.     10:47 PM:  Discussed the case with Dr. Huynh, vascular surgeon. He will evaluate pt in the ER to determine further course of care.     11:35 PM:  Dr. Huynh has evaluated pt at bedside and will admit pt to the hospital for  repair of pt's aortic aneurysm. Decision time to admit: Now.               MEDICAL DECISION MAKING      MDM  Number of Diagnoses or Management Options  Abdominal aortic aneurysm (AAA) without rupture (CMS/HCC):   Bilateral iliac artery aneurysm (CMS/HCC):      Amount and/or Complexity of Data Reviewed  Clinical lab tests: ordered and reviewed (WBC is 10.35. UA results reviewed.)  Tests in the radiology section of CPT®:  ordered and reviewed (CT Abd:  FINDINGS ABDOMEN CT:  There is some focal atrophy of the lower pole  right kidney with diminished enhancement which appears chronic but has  developed since the comparison examination. Left renal cyst has enlarged  slightly now measuring 3.7 cm, previously 3.1 cm. Remaining solid organs  have an unremarkable appearance. Normal appendix. There is extensive  colonic diverticulosis. The GI tract not opacified for assessment but  non obstructive in appearance. No ascites.     FINDINGS PELVIS CT:  Infrarenal aortic aneurysm has increased in size  now measuring 4.5 x 4.4 cm, previously 3.5 x 3.3 cm. As before, it  involves the bifurcation and iliacs, the left common iliac now measuring  3 cm, previously 2 cm. Right common iliac now measuring 2.5 cm,  previously 2.0 cm. Extensive plaque is present within the aneurysmal  segments but no evidence of leak or rupture at this time.)  Decide to obtain previous medical records or to obtain history from someone other than the patient: yes  Discuss the patient with other providers: yes (Discussed the case with Dr. Huynh, vascular surgeon.)    Patient Progress  Patient progress: stable             DIAGNOSIS  Final diagnoses:   Abdominal aortic aneurysm (AAA) without rupture (CMS/HCC)   Bilateral iliac artery aneurysm (CMS/HCC)         DISPOSITION  Pt admitted to med/surg.      ADMISSION    Discussed treatment plan and reason for admission with pt/family and admitting physician.  Pt/family voiced understanding of the plan for admission  "for further testing/treatment as needed.           Latest Documented Vital Signs:  As of 11:54 PM  BP- 129/87 HR- 94 Temp- 98.7 °F (37.1 °C) (Tympanic) O2 sat- 97%        --  Documentation assistance provided by leonardo Weeks for Dr. Rakesh MD.  Information recorded by the scribe was done at my direction and has been verified and validated by me.             Cristi Weeks  18 0013       Franklin Cameron MD  18 0015      Electronically signed by Franklin Cameron MD at 2018 12:15 AM     Jacqui Barron, RN at 2018  9:38 PM        Pt c/o lower abdominal pain with occasional pain in his lower back.  Denies n/v, diarrhea, urgency, frequency or any difficulty urinating.  States he called his PCP and the nurse with the answering service advised that he come to the hospital r/t having an abdominal anuerysm.  \"My best friend bled out and  last year from an anuerysm so I just want to be sure\"     Jacqui Barron, RN  18      Electronically signed by Jacqui Barron, RN at 2018  9:42 PM       Lines, Drains & Airways    Active LDAs     Name:   Placement date:   Placement time:   Site:   Days:    Peripheral IV 18 Left Forearm  18    Forearm    less than 1    Peripheral IV 18 0850 Right Hand  18    0850    Hand    less than 1    Peripheral IV 18 0934 Left Forearm  18    0934    Forearm    less than 1    Urethral Catheter Double-lumen;Silicone 16 Fr.  18    1015      less than 1    ETT   18    1034 created via procedure documentation    less than 1    Arterial Line 18 Right Radial  18    0840 created via procedure documentation  Radial    less than 1         Inactive LDAs     None                Hospital Medications (active)       Dose Frequency Start End    acetaminophen (TYLENOL) tablet 650 mg (MAR Hold) ((MAR Hold) since 2018  8:38 AM) 650 mg Every 4 Hours PRN 2018     Sig - Route: Take 2 tablets by " mouth Every 4 (Four) Hours As Needed for Mild Pain . - Oral    ceFAZolin in dextrose (ANCEF) IVPB solution 2 g 2 g Once 8/2/2018 8/2/2018    Sig - Route: Infuse 100 mL into a venous catheter 1 (One) Time. - Intravenous    dexamethasone (DECADRON) injection  As Needed 8/2/2018     Sig - Route: Infuse  into a venous catheter As Needed. - Intravenous    ePHEDrine injection  As Needed 8/2/2018     Sig - Route: Infuse  into a venous catheter As Needed. - Intravenous    famotidine (PEPCID) injection 20 mg 20 mg Once 8/2/2018 8/2/2018    Sig - Route: Infuse 2 mL into a venous catheter 1 (One) Time. - Intravenous    fentaNYL citrate (PF) (SUBLIMAZE) injection 50 mcg 50 mcg Every 10 Minutes PRN 8/2/2018     Sig - Route: Infuse 1 mL into a venous catheter Every 10 (Ten) Minutes As Needed for Severe Pain . - Intravenous    fentaNYL citrate (PF) (SUBLIMAZE) injection  As Needed 8/2/2018     Sig - Route: Infuse  into a venous catheter As Needed for Severe Pain . - Intravenous    heparin (porcine) injection  As Needed 8/2/2018     Sig: As Needed.    iopamidol (ISOVUE-250) 51 % injection 300 mL 300 mL Once in Imaging 8/2/2018     Sig - Route: Inject 300 mL into an arterial catheter Once. - Intra-arterial    iopamidol (ISOVUE-300) 61 % injection 100 mL 100 mL Once in Imaging 8/1/2018 8/1/2018    Sig - Route: Infuse 100 mL into a venous catheter Once. - Intravenous    lactated ringers infusion 9 mL/hr Continuous 8/2/2018     Sig - Route: Infuse 9 mL/hr into a venous catheter Continuous. - Intravenous    lidocaine (XYLOCAINE) 2% injection  As Needed 8/2/2018     Sig - Route: Inject  as directed As Needed. - Injection    lidocaine PF 1% (XYLOCAINE) injection 0.5 mL 0.5 mL Once As Needed 8/2/2018     Sig - Route: Inject 0.5 mL as directed 1 (One) Time As Needed (IV Start). - Injection    midazolam (VERSED) injection 1 mg 1 mg Every 5 Minutes PRN 8/2/2018     Sig - Route: Infuse 1 mL into a venous catheter Every 5 (Five) Minutes As  "Needed for Anxiety (Max 4mg midazolam TOTAL). - Intravenous    Linked Group 1:  \"Or\" Linked Group Details        midazolam (VERSED) injection 2 mg 2 mg Every 5 Minutes PRN 8/2/2018     Sig - Route: Infuse 2 mL into a venous catheter Every 5 (Five) Minutes As Needed for Anxiety (Max 4mg midazolam TOTAL). - Intravenous    Linked Group 1:  \"Or\" Linked Group Details        morphine injection 4 mg 4 mg Once 8/1/2018 8/1/2018    Sig - Route: Infuse 1 mL into a venous catheter 1 (One) Time. - Intravenous    morphine injection 4 mg 4 mg Once 8/2/2018 8/2/2018    Sig - Route: Infuse 1 mL into a venous catheter 1 (One) Time. - Intravenous    nitroglycerin (NITROSTAT) SL tablet 0.4 mg (MAR Hold) ((MAR Hold) since 8/2/2018  8:38 AM) 0.4 mg Every 5 Minutes PRN 8/1/2018     Sig - Route: Place 1 tablet under the tongue Every 5 (Five) Minutes As Needed for Chest Pain (if systolic BP greater than 100 mm/Hg.). - Sublingual    ondansetron (ZOFRAN) injection 4 mg 4 mg Once 8/1/2018 8/1/2018    Sig - Route: Infuse 2 mL into a venous catheter 1 (One) Time. - Intravenous    phenylephrine (ISAAC-SYNEPHRINE) injection  As Needed 8/2/2018     Sig - Route: Infuse  into a venous catheter As Needed. - Intravenous    Propofol (DIPRIVAN) injection  As Needed 8/2/2018     Sig - Route: Infuse  into a venous catheter As Needed. - Intravenous    rocuronium (ZEMURON) injection  As Needed 8/2/2018     Sig - Route: Infuse  into a venous catheter As Needed. - Intravenous    sodium chloride 0.9 % bolus 1,000 mL 1,000 mL Once 8/1/2018 8/1/2018    Sig - Route: Infuse 1,000 mL into a venous catheter 1 (One) Time. - Intravenous    sodium chloride 0.9 % flush 1-10 mL (MAR Hold) ((MAR Hold) since 8/2/2018  8:38 AM) 1-10 mL As Needed 8/1/2018     Sig - Route: Infuse 1-10 mL into a venous catheter As Needed for Line Care. - Intravenous    sodium chloride 0.9 % flush 1-10 mL 1-10 mL As Needed 8/2/2018     Sig - Route: Infuse 1-10 mL into a venous catheter As Needed " "for Line Care. - Intravenous    sodium chloride 0.9 % flush 10 mL (MAR Hold) ((MAR Hold) since 8/2/2018  8:38 AM) 10 mL As Needed 8/1/2018     Sig - Route: Infuse 10 mL into a venous catheter As Needed for Line Care. - Intravenous    Linked Group 2:  \"And\" Linked Group Details        sodium chloride 0.9 % infusion  Continuous PRN 8/2/2018     Sig: Continuous As Needed.    sodium chloride 1,000 mL with heparin (porcine) 10,000 Units mixture  As Needed 8/2/2018     Sig: As Needed.              "

## 2018-08-02 NOTE — ANESTHESIA PREPROCEDURE EVALUATION
Anesthesia Evaluation     Patient summary reviewed and Nursing notes reviewed   NPO Solid Status: > 8 hours  NPO Liquid Status: > 8 hours           Airway   Mallampati: II  Neck ROM: full  No difficulty expected  Dental - normal exam     Pulmonary     breath sounds clear to auscultation  (+) a smoker Current,   Cardiovascular     Rhythm: regular    (+) hypertension, CAD, cardiac stents more than 12 months ago PVD, hyperlipidemia,       Neuro/Psych  GI/Hepatic/Renal/Endo      Musculoskeletal     Abdominal    Substance History      OB/GYN          Other                        Anesthesia Plan    ASA 3     general   (A line)  intravenous induction   Anesthetic plan and risks discussed with patient.

## 2018-08-02 NOTE — ED PROVIDER NOTES
" EMERGENCY DEPARTMENT ENCOUNTER    Room Number:  30/30  Date seen:  8/2/2018  Time seen: 9:37 PM  PCP: BELKIS Patton  Historian: Patient, Family      HPI:  Chief Complaint: Abdominal Pain  Context: Sylvain Amador is a 57 y.o. male. Pt reports that he has a known 3.5 cm infrarenal aortic aneurysm, but is not currently being followed by a provider for this. Pt presents to the ED c/o intermittent episodes of periumbilical abdominal pain radiating to the lower back onset yesterday. Pt reports that his abdominal pain has improved since initial onset. Pt denies N/V/D, chest pain, dyspnea, dysuria, bowel/bladder incontinence, motor/sensory deficits, saddle anesthesia, urinary retention, and documented fever. There are no other complaints at this time.     Pain Location: Periumbilical region of the abdomen  Radiation: Lower back  Quality: \"aching\"  Intensity/Severity: Moderate  Duration: Onset yesterday  Onset quality: Gradual in onset  Timing: Intermittent  Progression: Improving  Aggravating Factors: Nothing  Alleviating Factors: Nothing  Previous Episodes: None  Treatment before arrival: Unknown  Associated Symptoms: None        MEDICAL RECORD REVIEW    Pt had a CTA Abdomen performed in 2014 that showed:  1. 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  2. Fusiform infrarenal aortic aneurysm (maximum diameter 3.5 cm)            PAST MEDICAL HISTORY  Active Ambulatory Problems     Diagnosis Date Noted   • Coronary atherosclerosis of native coronary vessel    • Hyperlipidemia    • Hypertension    • PAD (peripheral artery disease) (CMS/[facility])      Resolved Ambulatory Problems     Diagnosis Date Noted   • No Resolved Ambulatory Problems     Past Medical History:   Diagnosis Date   • Atherosclerosis of native coronary artery of native heart without angina pectoris    • CAD (coronary artery disease)    • Chest pain    • " Coronary atherosclerosis of native coronary vessel    • Hyperlipidemia    • Hypertension    • Intermittent claudication (CMS/HCC)    • PAD (peripheral artery disease) (CMS/HCC)          PAST SURGICAL HISTORY  Past Surgical History:   Procedure Laterality Date   • VASECTOMY           FAMILY HISTORY  Family History   Problem Relation Age of Onset   • Diabetes Mother    • Hypertension Mother    • Pancreatic cancer Mother    • Cancer Father    • Cancer Brother          SOCIAL HISTORY  Social History     Social History   • Marital status:      Spouse name: N/A   • Number of children: N/A   • Years of education: N/A     Occupational History   • Not on file.     Social History Main Topics   • Smoking status: Current Every Day Smoker     Packs/day: 0.50   • Smokeless tobacco: Former User      Comment: caffeine use   • Alcohol use No   • Drug use: No   • Sexual activity: Not on file     Other Topics Concern   • Not on file     Social History Narrative   • No narrative on file         ALLERGIES  No known drug allergy        REVIEW OF SYSTEMS  Review of Systems   Constitutional: Negative for chills and fever (pt denies documented fever).   HENT: Negative for congestion, rhinorrhea and sore throat.    Eyes: Negative for pain.   Respiratory: Negative for cough and shortness of breath.    Cardiovascular: Negative for chest pain, palpitations and leg swelling.   Gastrointestinal: Positive for abdominal pain (radiating to the lower back). Negative for diarrhea, nausea and vomiting.   Genitourinary: Negative for difficulty urinating, dysuria, flank pain and frequency.   Musculoskeletal: Positive for back pain (associated with abdominal pain). Negative for myalgias, neck pain and neck stiffness.   Skin: Negative for rash.   Neurological: Negative for dizziness, speech difficulty, weakness, light-headedness, numbness and headaches.   Psychiatric/Behavioral: Negative.    All other systems reviewed and are negative.            PHYSICAL EXAM  ED Triage Vitals   Temp Heart Rate Resp BP SpO2   08/01/18 2046 08/01/18 2046 08/01/18 2046 08/01/18 2107 08/01/18 2046   98.7 °F (37.1 °C) 94 16 129/87 98 %      Temp src Heart Rate Source Patient Position BP Location FiO2 (%)   08/01/18 2046 08/01/18 2046 08/01/18 2107 08/01/18 2107 --   Tympanic Monitor Sitting Left arm        Physical Exam   Constitutional: He is oriented to person, place, and time. No distress.   HENT:   Head: Normocephalic.   Mouth/Throat: Mucous membranes are normal.   Eyes: Pupils are equal, round, and reactive to light. EOM are normal.   Neck: Normal range of motion. Neck supple.   Cardiovascular: Normal rate, regular rhythm and normal heart sounds.    Pulmonary/Chest: Effort normal and breath sounds normal. No respiratory distress. He has no decreased breath sounds. He has no wheezes. He has no rhonchi. He has no rales.   Abdominal: Soft. He exhibits mass (lower abdomen - pulsatile). There is tenderness in the suprapubic area. There is no rebound and no guarding.   Musculoskeletal: Normal range of motion.   Neurological: He is alert and oriented to person, place, and time. He has normal sensation.   Skin: Skin is warm and dry.   Psychiatric: Mood and affect normal.   Nursing note and vitals reviewed.          LAB RESULTS  Recent Results (from the past 24 hour(s))   Comprehensive Metabolic Panel    Collection Time: 08/01/18  9:12 PM   Result Value Ref Range    Glucose 107 (H) 65 - 99 mg/dL    BUN 11 6 - 20 mg/dL    Creatinine 0.85 0.76 - 1.27 mg/dL    Sodium 138 136 - 145 mmol/L    Potassium 3.9 3.5 - 5.2 mmol/L    Chloride 102 98 - 107 mmol/L    CO2 22.7 22.0 - 29.0 mmol/L    Calcium 8.9 8.6 - 10.5 mg/dL    Total Protein 7.7 6.0 - 8.5 g/dL    Albumin 4.50 3.50 - 5.20 g/dL    ALT (SGPT) 15 1 - 41 U/L    AST (SGOT) 10 1 - 40 U/L    Alkaline Phosphatase 70 39 - 117 U/L    Total Bilirubin 0.3 0.1 - 1.2 mg/dL    eGFR Non African Amer 92 >60 mL/min/1.73    Globulin 3.2  gm/dL    A/G Ratio 1.4 g/dL    BUN/Creatinine Ratio 12.9 7.0 - 25.0    Anion Gap 13.3 mmol/L   Urinalysis With Microscopic If Indicated (No Culture) - Urine, Clean Catch    Collection Time: 08/01/18  9:12 PM   Result Value Ref Range    Color, UA Yellow Yellow, Straw    Appearance, UA Clear Clear    pH, UA 6.0 5.0 - 8.0    Specific Gravity, UA 1.011 1.005 - 1.030    Glucose, UA Negative Negative    Ketones, UA Negative Negative    Bilirubin, UA Negative Negative    Blood, UA Negative Negative    Protein, UA Negative Negative    Leuk Esterase, UA Negative Negative    Nitrite, UA Negative Negative    Urobilinogen, UA 0.2 E.U./dL 0.2 - 1.0 E.U./dL   CBC Auto Differential    Collection Time: 08/01/18  9:12 PM   Result Value Ref Range    WBC 10.35 4.50 - 10.70 10*3/mm3    RBC 5.12 4.60 - 6.00 10*6/mm3    Hemoglobin 15.9 13.7 - 17.6 g/dL    Hematocrit 46.8 40.4 - 52.2 %    MCV 91.4 79.8 - 96.2 fL    MCH 31.1 27.0 - 32.7 pg    MCHC 34.0 32.6 - 36.4 g/dL    RDW 13.9 11.5 - 14.5 %    RDW-SD 46.6 37.0 - 54.0 fl    MPV 11.0 6.0 - 12.0 fL    Platelets 150 140 - 500 10*3/mm3    Neutrophil % 63.6 42.7 - 76.0 %    Lymphocyte % 27.9 19.6 - 45.3 %    Monocyte % 7.1 5.0 - 12.0 %    Eosinophil % 1.1 0.3 - 6.2 %    Basophil % 0.3 0.0 - 1.5 %    Immature Grans % 0.3 0.0 - 0.5 %    Neutrophils, Absolute 6.59 1.90 - 8.10 10*3/mm3    Lymphocytes, Absolute 2.89 0.90 - 4.80 10*3/mm3    Monocytes, Absolute 0.73 0.20 - 1.20 10*3/mm3    Eosinophils, Absolute 0.11 0.00 - 0.70 10*3/mm3    Basophils, Absolute 0.03 0.00 - 0.20 10*3/mm3    Immature Grans, Absolute 0.03 0.00 - 0.03 10*3/mm3    nRBC 0.0 0.0 - 0.0 /100 WBC       Ordered the above labs and reviewed the results.        RADIOLOGY  CT Abdomen Pelvis With Contrast   Final Result   IMPRESSION :    1. Renal findings as discussed.   2. Extensive colonic diverticulosis.   3. Enlarging aortoiliac aneurysms as discussed, no leak or rupture      FINDINGS ABDOMEN CT:  There is some focal atrophy of  the lower pole  right kidney with diminished enhancement which appears chronic but has  developed since the comparison examination. Left renal cyst has enlarged  slightly now measuring 3.7 cm, previously 3.1 cm. Remaining solid organs  have an unremarkable appearance. Normal appendix. There is extensive  colonic diverticulosis. The GI tract not opacified for assessment but  non obstructive in appearance. No ascites.     FINDINGS PELVIS CT:  Infrarenal aortic aneurysm has increased in size  now measuring 4.5 x 4.4 cm, previously 3.5 x 3.3 cm. As before, it  involves the bifurcation and iliacs, the left common iliac now measuring  3 cm, previously 2 cm. Right common iliac now measuring 2.5 cm,  previously 2.0 cm. Extensive plaque is present within the aneurysmal  segments but no evidence of leak or rupture at this time.           This report was finalized on 8/1/2018 10:15 PM by Carlton Rios M.D.                 Ordered the above noted radiological studies. Reviewed by me in PACS.            PROCEDURES  Procedures        MEDICATIONS GIVEN IN ER  Medications   sodium chloride 0.9 % flush 10 mL (not administered)   morphine injection 4 mg (4 mg Intravenous Given 8/1/18 2115)   ondansetron (ZOFRAN) injection 4 mg (4 mg Intravenous Given 8/1/18 2115)   sodium chloride 0.9 % bolus 1,000 mL (1,000 mL Intravenous New Bag 8/1/18 2155)   iopamidol (ISOVUE-300) 61 % injection 100 mL (85 mL Intravenous Given 8/1/18 2203)             PROGRESS AND CONSULTS  ED Course as of Aug 02 0013   Wed Aug 01, 2018   2107 Pt is a 57 yom with a cc lower abdominal pain onset yesterday now radiating ot his back. Constant. No associated nausea,vomiting or diarrhea. No urinary complaints. Has a known 4cm AAA which was last imaged about 2 years ago. He called the nurse on his insurance card who advised him to checked out.   [JS]   8113 11:38 PM  Patient with lower abdominal pain and appears to have AAA as well as iliac aneurysm.  Discussed with  Dr. Huynh who is here seeing patient and will admit for operative repair.  [SL]      ED Course User Index  [JS] BELKIS Lopez  [SL] Franklin Cameron MD     9:48 PM:  IV fluids ordered to hydrate pt. Morphine and Zofran ordered to treat for abdominal pain. CT Abd, blood work, and UA have been ordered for further evaluation.    10:18 PM:  Rechecked pt. Pt is resting comfortably and appears in no acute distress. Informed pt that his UA is unremarkable. WBC is WNL. Pt's CT Abd shows that pt's infrarenal aortic aneurysm has increased in size from 3.5 x 3.3 cm and now measures 4.5 x 4.4 cm. There is no leak or rupture present currently. Will discuss further course of care with the vascular surgeon. Pt agrees with plan.    10:24 PM:  Placed call to the vascular surgeon to discuss further course of care.     10:47 PM:  Discussed the case with Dr. Huynh, vascular surgeon. He will evaluate pt in the ER to determine further course of care.     11:35 PM:  Dr. Huynh has evaluated pt at bedside and will admit pt to the hospital for repair of pt's aortic aneurysm. Decision time to admit: Now.               MEDICAL DECISION MAKING      MDM  Number of Diagnoses or Management Options  Abdominal aortic aneurysm (AAA) without rupture (CMS/HCC):   Bilateral iliac artery aneurysm (CMS/HCC):      Amount and/or Complexity of Data Reviewed  Clinical lab tests: ordered and reviewed (WBC is 10.35. UA results reviewed.)  Tests in the radiology section of CPT®: ordered and reviewed (CT Abd:  FINDINGS ABDOMEN CT:  There is some focal atrophy of the lower pole  right kidney with diminished enhancement which appears chronic but has  developed since the comparison examination. Left renal cyst has enlarged  slightly now measuring 3.7 cm, previously 3.1 cm. Remaining solid organs  have an unremarkable appearance. Normal appendix. There is extensive  colonic diverticulosis. The GI tract not opacified for assessment but  non  obstructive in appearance. No ascites.     FINDINGS PELVIS CT:  Infrarenal aortic aneurysm has increased in size  now measuring 4.5 x 4.4 cm, previously 3.5 x 3.3 cm. As before, it  involves the bifurcation and iliacs, the left common iliac now measuring  3 cm, previously 2 cm. Right common iliac now measuring 2.5 cm,  previously 2.0 cm. Extensive plaque is present within the aneurysmal  segments but no evidence of leak or rupture at this time.)  Decide to obtain previous medical records or to obtain history from someone other than the patient: yes  Discuss the patient with other providers: yes (Discussed the case with Dr. Huynh, vascular surgeon.)    Patient Progress  Patient progress: stable             DIAGNOSIS  Final diagnoses:   Abdominal aortic aneurysm (AAA) without rupture (CMS/HCC)   Bilateral iliac artery aneurysm (CMS/HCC)         DISPOSITION  Pt admitted to med/surg.      ADMISSION    Discussed treatment plan and reason for admission with pt/family and admitting physician.  Pt/family voiced understanding of the plan for admission for further testing/treatment as needed.           Latest Documented Vital Signs:  As of 11:54 PM  BP- 129/87 HR- 94 Temp- 98.7 °F (37.1 °C) (Tympanic) O2 sat- 97%        --  Documentation assistance provided by leonardo Weeks for Dr. Rakesh MD.  Information recorded by the scribe was done at my direction and has been verified and validated by me.             Cristi Weeks  08/02/18 0013       Franklin Cameron MD  08/02/18 0015

## 2018-08-03 ENCOUNTER — APPOINTMENT (OUTPATIENT)
Dept: GENERAL RADIOLOGY | Facility: HOSPITAL | Age: 58
End: 2018-08-03

## 2018-08-03 LAB
ACT BLD: 120 SECONDS (ref 82–152)
ACT BLD: 120 SECONDS (ref 82–152)
ACT BLD: 208 SECONDS (ref 82–152)
ACT BLD: 230 SECONDS (ref 82–152)
ACT BLD: 235 SECONDS (ref 82–152)
ACT BLD: 252 SECONDS (ref 82–152)
ACT BLD: 279 SECONDS (ref 82–152)
ANION GAP SERPL CALCULATED.3IONS-SCNC: 13.2 MMOL/L
ARTERIAL PATENCY WRIST A: ABNORMAL
ATMOSPHERIC PRESS: 753.7 MMHG
BASE EXCESS BLDA CALC-SCNC: -2.3 MMOL/L (ref 0–2)
BASOPHILS # BLD AUTO: 0.01 10*3/MM3 (ref 0–0.2)
BASOPHILS NFR BLD AUTO: 0.1 % (ref 0–1.5)
BDY SITE: ABNORMAL
BUN BLD-MCNC: 13 MG/DL (ref 6–20)
BUN/CREAT SERPL: 13.1 (ref 7–25)
CALCIUM SPEC-SCNC: 7.5 MG/DL (ref 8.6–10.5)
CHLORIDE SERPL-SCNC: 103 MMOL/L (ref 98–107)
CO2 SERPL-SCNC: 20.8 MMOL/L (ref 22–29)
CREAT BLD-MCNC: 0.99 MG/DL (ref 0.76–1.27)
DEPRECATED RDW RBC AUTO: 48.2 FL (ref 37–54)
EOSINOPHIL # BLD AUTO: 0.02 10*3/MM3 (ref 0–0.7)
EOSINOPHIL NFR BLD AUTO: 0.2 % (ref 0.3–6.2)
ERYTHROCYTE [DISTWIDTH] IN BLOOD BY AUTOMATED COUNT: 14.2 % (ref 11.5–14.5)
GAS FLOW AIRWAY: 3 LPM
GFR SERPL CREATININE-BSD FRML MDRD: 77 ML/MIN/1.73
GLUCOSE BLD-MCNC: 128 MG/DL (ref 65–99)
GLUCOSE BLDC GLUCOMTR-MCNC: 141 MG/DL (ref 70–130)
GLUCOSE BLDC GLUCOMTR-MCNC: 141 MG/DL (ref 70–130)
GLUCOSE BLDC GLUCOMTR-MCNC: 147 MG/DL (ref 70–130)
GLUCOSE BLDC GLUCOMTR-MCNC: 148 MG/DL (ref 70–130)
HCO3 BLDA-SCNC: 21.6 MMOL/L (ref 22–28)
HCT VFR BLD AUTO: 37 % (ref 40.4–52.2)
HGB BLD-MCNC: 12.2 G/DL (ref 13.7–17.6)
IMM GRANULOCYTES # BLD: 0.03 10*3/MM3 (ref 0–0.03)
IMM GRANULOCYTES NFR BLD: 0.3 % (ref 0–0.5)
LYMPHOCYTES # BLD AUTO: 1.46 10*3/MM3 (ref 0.9–4.8)
LYMPHOCYTES NFR BLD AUTO: 14 % (ref 19.6–45.3)
MCH RBC QN AUTO: 30.6 PG (ref 27–32.7)
MCHC RBC AUTO-ENTMCNC: 33 G/DL (ref 32.6–36.4)
MCV RBC AUTO: 92.7 FL (ref 79.8–96.2)
MODALITY: ABNORMAL
MONOCYTES # BLD AUTO: 1.16 10*3/MM3 (ref 0.2–1.2)
MONOCYTES NFR BLD AUTO: 11.1 % (ref 5–12)
NEUTROPHILS # BLD AUTO: 7.79 10*3/MM3 (ref 1.9–8.1)
NEUTROPHILS NFR BLD AUTO: 74.6 % (ref 42.7–76)
PCO2 BLDA: 34 MM HG (ref 35–45)
PH BLDA: 7.41 PH UNITS (ref 7.35–7.45)
PLATELET # BLD AUTO: 108 10*3/MM3 (ref 140–500)
PMV BLD AUTO: 10.4 FL (ref 6–12)
PO2 BLDA: 62.3 MM HG (ref 80–100)
POTASSIUM BLD-SCNC: 4 MMOL/L (ref 3.5–5.2)
RBC # BLD AUTO: 3.99 10*6/MM3 (ref 4.6–6)
SAO2 % BLDCOA: 92 % (ref 92–99)
SET MECH RESP RATE: 20
SODIUM BLD-SCNC: 137 MMOL/L (ref 136–145)
WBC NRBC COR # BLD: 10.44 10*3/MM3 (ref 4.5–10.7)

## 2018-08-03 PROCEDURE — 85025 COMPLETE CBC W/AUTO DIFF WBC: CPT | Performed by: SURGERY

## 2018-08-03 PROCEDURE — 25010000002 MORPHINE SULFATE (PF) 2 MG/ML SOLUTION: Performed by: SURGERY

## 2018-08-03 PROCEDURE — 82962 GLUCOSE BLOOD TEST: CPT

## 2018-08-03 PROCEDURE — 71046 X-RAY EXAM CHEST 2 VIEWS: CPT

## 2018-08-03 PROCEDURE — 80048 BASIC METABOLIC PNL TOTAL CA: CPT | Performed by: SURGERY

## 2018-08-03 PROCEDURE — 25010000002 ONDANSETRON PER 1 MG: Performed by: SURGERY

## 2018-08-03 PROCEDURE — 94799 UNLISTED PULMONARY SVC/PX: CPT

## 2018-08-03 PROCEDURE — 94640 AIRWAY INHALATION TREATMENT: CPT

## 2018-08-03 PROCEDURE — 25010000002 ENOXAPARIN PER 10 MG: Performed by: SURGERY

## 2018-08-03 PROCEDURE — 25010000002 MORPHINE PER 10 MG: Performed by: SURGERY

## 2018-08-03 PROCEDURE — 36600 WITHDRAWAL OF ARTERIAL BLOOD: CPT

## 2018-08-03 PROCEDURE — 82803 BLOOD GASES ANY COMBINATION: CPT

## 2018-08-03 RX ORDER — HYDROCODONE BITARTRATE AND ACETAMINOPHEN 5; 325 MG/1; MG/1
2 TABLET ORAL EVERY 6 HOURS PRN
Qty: 24 TABLET | Refills: 0 | Status: SHIPPED | OUTPATIENT
Start: 2018-08-03 | End: 2018-11-03 | Stop reason: HOSPADM

## 2018-08-03 RX ORDER — FAMOTIDINE 20 MG/1
20 TABLET, FILM COATED ORAL DAILY
Status: DISCONTINUED | OUTPATIENT
Start: 2018-08-03 | End: 2018-08-04 | Stop reason: HOSPADM

## 2018-08-03 RX ORDER — CALCIUM CARBONATE 200(500)MG
2 TABLET,CHEWABLE ORAL 3 TIMES DAILY PRN
Status: DISCONTINUED | OUTPATIENT
Start: 2018-08-03 | End: 2018-08-04 | Stop reason: HOSPADM

## 2018-08-03 RX ORDER — IPRATROPIUM BROMIDE AND ALBUTEROL SULFATE 2.5; .5 MG/3ML; MG/3ML
3 SOLUTION RESPIRATORY (INHALATION)
Status: DISCONTINUED | OUTPATIENT
Start: 2018-08-03 | End: 2018-08-04 | Stop reason: HOSPADM

## 2018-08-03 RX ORDER — IPRATROPIUM BROMIDE AND ALBUTEROL SULFATE 2.5; .5 MG/3ML; MG/3ML
3 SOLUTION RESPIRATORY (INHALATION)
Status: DISCONTINUED | OUTPATIENT
Start: 2018-08-03 | End: 2018-08-03

## 2018-08-03 RX ADMIN — CILOSTAZOL 100 MG: 100 TABLET ORAL at 08:31

## 2018-08-03 RX ADMIN — SODIUM CHLORIDE 125 ML/HR: 9 INJECTION, SOLUTION INTRAVENOUS at 07:56

## 2018-08-03 RX ADMIN — ATORVASTATIN CALCIUM 80 MG: 80 TABLET, FILM COATED ORAL at 20:24

## 2018-08-03 RX ADMIN — OXYCODONE HYDROCHLORIDE AND ACETAMINOPHEN 1 TABLET: 5; 325 TABLET ORAL at 07:56

## 2018-08-03 RX ADMIN — IPRATROPIUM BROMIDE AND ALBUTEROL SULFATE 3 ML: .5; 3 SOLUTION RESPIRATORY (INHALATION) at 10:36

## 2018-08-03 RX ADMIN — ASPIRIN 81 MG: 81 TABLET, DELAYED RELEASE ORAL at 08:31

## 2018-08-03 RX ADMIN — ONDANSETRON 4 MG: 2 INJECTION INTRAMUSCULAR; INTRAVENOUS at 12:50

## 2018-08-03 RX ADMIN — ENOXAPARIN SODIUM 40 MG: 40 INJECTION SUBCUTANEOUS at 08:31

## 2018-08-03 RX ADMIN — MORPHINE SULFATE 4 MG: 2 INJECTION, SOLUTION INTRAMUSCULAR; INTRAVENOUS at 04:24

## 2018-08-03 RX ADMIN — IPRATROPIUM BROMIDE AND ALBUTEROL SULFATE 3 ML: .5; 3 SOLUTION RESPIRATORY (INHALATION) at 21:00

## 2018-08-03 RX ADMIN — IPRATROPIUM BROMIDE AND ALBUTEROL SULFATE 3 ML: .5; 3 SOLUTION RESPIRATORY (INHALATION) at 06:48

## 2018-08-03 RX ADMIN — CILOSTAZOL 100 MG: 100 TABLET ORAL at 20:24

## 2018-08-03 RX ADMIN — MORPHINE SULFATE 4 MG: 2 INJECTION, SOLUTION INTRAMUSCULAR; INTRAVENOUS at 10:45

## 2018-08-03 RX ADMIN — OXYCODONE HYDROCHLORIDE AND ACETAMINOPHEN 1 TABLET: 5; 325 TABLET ORAL at 14:59

## 2018-08-03 RX ADMIN — FAMOTIDINE 20 MG: 20 TABLET, FILM COATED ORAL at 16:55

## 2018-08-03 RX ADMIN — OXYCODONE HYDROCHLORIDE AND ACETAMINOPHEN 1 TABLET: 5; 325 TABLET ORAL at 20:24

## 2018-08-03 NOTE — PROGRESS NOTES
Name: Sylvain Amador ADMIT: 2018   : 1960  PCP: Justina Smith APRN    MRN: [id number] LOS: 2 days   AGE/SEX: 57 y.o. male  ROOM: [facility]     CC: Symptomatic abdominal aortic aneurysm  Interval History: Postop day #1.  Abdominal pain has improved.  No lower extremity pain.  He still has the back pain that he thinks is related to being in the bed.  CT scan last night was uneventful.  Subjective   Review of Systems      Objective     Vital Signs  Temp:  [97.4 °F (36.3 °C)-98.3 °F (36.8 °C)] 97.4 °F (36.3 °C)  Heart Rate:  [] 82  Resp:  [12-20] 16  BP: ()/(63-96) 131/74  Arterial Line BP: ()/(40-68) 99/50    No intake/output data recorded.    Physical Exam  Vascular: Still a little mottling of the right fifth digit but the rest of nearly normal.  Groin sites are okay    Data Reviewed:  CBC    Results from last 7 days  Lab Units 18  0531 18  2112   WBC 10*3/mm3 10.44 10.35   HEMOGLOBIN g/dL 12.2* 15.9   PLATELETS 10*3/mm3 108* 150     BMP   Results from last 7 days  Lab Units 18  0531 18  2112   SODIUM mmol/L 137 138   POTASSIUM mmol/L 4.0 3.9   CHLORIDE mmol/L 103 102   CO2 mmol/L 20.8* 22.7   BUN mg/dL 13 11   CREATININE mg/dL 0.99 0.85   GLUCOSE mg/dL 128* 107*     Imaging Reviewed: CTA        Active Hospital Problems    Diagnosis Date Noted   • Abdominal aortic aneurysm (AAA) without rupture (CMS/HCC) [I71.4] 2018      Resolved Hospital Problems    Diagnosis Date Noted Date Resolved   No resolved problems to display.      Assessment/Plan   Billin, Post Op Global    Impression/Plan:  Postop day #1 status post emergent aneurysm repair yesterday.  Overall, I think he is doing very well.  We will plan to DC his A-line, Hep-Lock his IV, and DC his Maldonado today.  He is having some problems weaning off oxygen.  We can do that, then I would not be opposed with him being discharged this afternoon.    Arturo Huynh MD  18  8:21  [facility]  Office Number [phone]

## 2018-08-03 NOTE — PLAN OF CARE
Problem: Patient Care Overview  Goal: Plan of Care Review  Outcome: Ongoing (interventions implemented as appropriate)   08/03/18 0312   Coping/Psychosocial   Plan of Care Reviewed With patient   OTHER   Outcome Summary VSS; pt received 4mg morphine x1 and has denied pain in abd or back since. Pt. right DP PT and left DP dopplerable. Both lower extremities are warm. Groin incisions are CDI. Will continue to monitor.        Problem: Pain, Acute (Adult)  Goal: Acceptable Pain Control/Comfort Level  Outcome: Ongoing (interventions implemented as appropriate)      Problem: Fall Risk (Adult)  Goal: Identify Related Risk Factors and Signs and Symptoms  Outcome: Outcome(s) achieved Date Met: 08/03/18    Goal: Absence of Fall  Outcome: Ongoing (interventions implemented as appropriate)      Problem: Skin Injury Risk (Adult)  Goal: Identify Related Risk Factors and Signs and Symptoms  Outcome: Outcome(s) achieved Date Met: 08/03/18    Goal: Skin Health and Integrity  Outcome: Ongoing (interventions implemented as appropriate)

## 2018-08-03 NOTE — PROGRESS NOTES
I thought we be able to wean this gentleman oxygen over the course of the day without too much difficulty.  However, he actually escalated his O2 requirements from 2 L up to 5 L.  Chest x-ray and ABG were ordered.  Chest x-ray was pretty unremarkable.  ABG showed a PO2 of only 62.  This was done on 3 L nasal cannula.  He is also complaining of heartburn that is chronic in nature but worse now.    I suspect he may have some more chronic lung disease and we appreciate given his long-term tobacco abuse.  I'll go ahead and ask pulmonology to see him.  I'll also give him some Pepcid and see if that helps his heartburn.  We'll hold on discharge for now.

## 2018-08-03 NOTE — PROGRESS NOTES
Discharge Planning Assessment  [facility]     Patient Name: Sylvain Amador  MRN: [id number]  Today's Date: 8/3/2018    Admit Date: 8/1/2018          Discharge Needs Assessment     Row Name 08/03/18 1512       Transition Planning    Patient/Family Anticipates Transition to home with family    Patient/Family Anticipated Services at Transition none    Row Name 08/03/18 1511       Living Environment    Current Living Arrangements home/apartment/condo       Transition Planning    Transportation Anticipated family or friend will provide       Discharge Needs Assessment    Equipment Currently Used at Home none            Discharge Plan     Row Name 08/03/18 1512       Plan    Plan Home, no known needs    Patient/Family in Agreement with Plan yes    Plan Comments Met with pt at bedside.  Introduced self, explained CCP role, facesheet verified.  Pt states he is independent with ADL's prior to hospitalization.  Denies any history of DME, HH, or SNF.  Plans to return home.  If O2 needed at discharge will use [facility].  CCP will follow.  JUDITH Rossi RN        Destination     No service coordination in this encounter.      Durable Medical Equipment     No service coordination in this encounter.      Dialysis/Infusion     No service coordination in this encounter.      Home Medical Care     No service coordination in this encounter.      Social Care     No service coordination in this encounter.        Expected Discharge Date and Time     Expected Discharge Date Expected Discharge Time    Aug 3, 2018               Demographic Summary     Row Name 08/03/18 1510       General Information    Admission Type inpatient    Arrived From home    Referral Source admission list    Reason for Consult discharge planning    Preferred Language English       Contact Information    Permission Granted to Share Info With family/designee   Germania Hollins (spouse) [phone]            Functional Status     Row Name 08/03/18 1513        Functional Status, IADL    Medications independent    Meal Preparation independent    Housekeeping independent    Laundry independent    Shopping independent            Psychosocial    No documentation.           Abuse/Neglect    No documentation.           Legal    No documentation.           Substance Abuse    No documentation.           Patient Forms    No documentation.         Lynn Rossi

## 2018-08-03 NOTE — DISCHARGE SUMMARY
Name: Sylvain Amador ADMIT: 2018   : 1960  PCP: Justina Smith APRN    MRN: [id number] LOS: 2 days   AGE/SEX: 57 y.o. male  ROOM: [facility]     Date of Admission: 2018  Date of Discharge:  8/3/2018    PCP: Justina Smith APRN      DISCHARGE DIAGNOSIS  Active Hospital Problems    Diagnosis Date Noted   • Abdominal aortic aneurysm (AAA) without rupture (CMS/[facility]) [I71.4] 2018      Resolved Hospital Problems    Diagnosis Date Noted Date Resolved   No resolved problems to display.       SECONDARY DIAGNOSES  Past Medical History:   Diagnosis Date   • Atherosclerosis of native coronary artery of native heart without angina pectoris    • CAD (coronary artery disease)    • Chest pain    • Coronary atherosclerosis of native coronary vessel    • Hyperlipidemia    • Hypertension    • Intermittent claudication (CMS/HCC)    • PAD (peripheral artery disease) (CMS/[facility])     of lower extremities       CONSULTS   Consults     Date and Time Order Name Status Description    2018 2224 Surgery (on-call MD unless specified) Completed           PROCEDURES PERFORMED    Date: 2018, endovascular aortic aneurysm repair and left common iliac artery aneurysm repair (emergency)    HOSPITAL COURSE  Patient is a 57 y.o. male presented to [facility] admitted for abdominal and back pain and was found to have an enlarging abdominal aortic aneurysm.  This measured 4.6 cm in size and had no obvious signs of rupture but I was concerned that he could be potentially symptomatic from this with no other etiology for his abdominal pain and back pain.  For this reason he was taken emergently to the operating room for repair.  The patient had significant mural thrombus in his aneurysm that he increase his risk of a poor seal or distal embolization.  He also had what looked like some small amount of thrombus causing a stenosis in the left renal artery.  I did not plan intervention here and instead  "focused on repair of his aneurysm.  We were able to use a [place] Excluder iliac branch device to maintain perfusion to the right internal iliac artery.  A traditional endovascular aortic stent graft was used to exclude the aneurysm of the infra renal abdominal aorta and a bell bottom cuff was used with the dilated and ectatic right common iliac artery aneurysm.  Patient went to the recovery room and then to the floor in stable condition.  He has known bilateral SFA occlusions.  Despite this, he did have some distal embolization to his bilateral feet that improved overnight.  Because of his persistent abdominal and back pain I did order a CT scan on postoperative day #0 that was uneventful.  The air in the scan I think is just related to the procedural manipulation and I have seen this in the past.  On the morning after surgery the patient was anxious to go home.  We're having some trouble weaning his oxygen but we encouraged incentive spirometry and pulmonary toilet if he can get this off today that I would feel comfortable sending him home.  He is already on aspirin and statin at home.      VITAL SIGNS  /77 (BP Location: Left arm, Patient Position: Lying)   Pulse 72   Temp 98.1 °F (36.7 °C)   Resp 18   Ht 182.9 cm (72\")   Wt 92.4 kg (203 lb 9.6 oz)   SpO2 90%   BMI 27.61 kg/m²   Objective  CONDITION ON DISCHARGE  Stable.      DISCHARGE DISPOSITION   Home or Self Care      DISCHARGE MEDICATIONS     Discharge Medications      New Medications      Instructions Start Date   HYDROcodone-acetaminophen 5-325 MG per tablet  Commonly known as:  NORCO   2 tablets, Oral, Every 6 Hours PRN         Continue These Medications      Instructions Start Date   aspirin 81 MG EC tablet   81 mg, Oral, 2 Times Daily      atorvastatin 80 MG tablet  Commonly known as:  LIPITOR   80 mg, Oral, Nightly      cilostazol 100 MG tablet  Commonly known as:  PLETAL   1 tablet, Oral, 2 Times Daily      lisinopril 5 MG tablet  Commonly " known as:  PRINIVIL,ZESTRIL   TAKE 1 TABLET BY MOUTH DAILY.      lisinopril 5 MG tablet  Commonly known as:  PRINIVIL,ZESTRIL   TAKE 1 TABLET BY MOUTH DAILY.      metoprolol tartrate 25 MG tablet  Commonly known as:  LOPRESSOR   TAKE 1/2 TABLET BY MOUTH TWICE DAILY.            No future appointments.  Follow-up Information     Justina Smith APRN Follow up.    Specialty:  Family Medicine  Contact information:  [address]   [address]  [facility] [address]  [phone]             Provider, No Known Follow up.    Contact information:  [facility] [address]             Arturo Huynh MD Follow up in 2 week(s).    Specialty:  Vascular Surgery  Contact information:  [address]  [facility] [id number] [phone]                   TEST  RESULTS PENDING AT DISCHARGE  None     [place], [facility]    Arturo Huynh MD  Office Number [phone]    18  9:28 AM

## 2018-08-03 NOTE — PLAN OF CARE
Problem: Patient Care Overview  Goal: Plan of Care Review  Outcome: Ongoing (interventions implemented as appropriate)   08/03/18 1318   Coping/Psychosocial   Plan of Care Reviewed With patient   OTHER   Outcome Summary patient vss. unable to wean oxygen at this time. still requiring 4 liters. chest x ray done and breathing treatments being given. patient working on IS and coughing and deep breathing. will continue to wean as able.        Problem: Pain, Acute (Adult)  Goal: Acceptable Pain Control/Comfort Level  Outcome: Ongoing (interventions implemented as appropriate)   08/03/18 1318   Pain, Acute (Adult)   Acceptable Pain Control/Comfort Level making progress toward outcome       Problem: Fall Risk (Adult)  Goal: Absence of Fall  Outcome: Ongoing (interventions implemented as appropriate)   08/03/18 1318   Fall Risk (Adult)   Absence of Fall making progress toward outcome       Problem: Skin Injury Risk (Adult)  Goal: Skin Health and Integrity  Outcome: Ongoing (interventions implemented as appropriate)   08/03/18 1318   Skin Injury Risk (Adult)   Skin Health and Integrity making progress toward outcome

## 2018-08-03 NOTE — CONSULTS
[facility]  Phone: [phone]  Antione Newton MD      Subjective   LOS: 2 days     Thank you for this pulmonary consultation.  57-year-old male who is status post percutaneous repair of AAA and left common iliac artery aneurysm.  He presented with lower abdominal pain and back pain.  His aneurysm is about 4.5 cm.  Due to the unexplained etiology of pain it was felt important to repair the aneurysm.  His subsequent CT showed evidence of renal infarcts.  Postoperatively he has been hypoxic.  We were asked to give an opinion.  He has a previous medical history of coronary artery stent.  Last operated in 2014.  Since then no new events.  Apparently normal LV function.  He follows with [facility].  He denies any orthopnea.  He sometimes gets ankle edema.  He has chronic hypertension as well as peripheral arterial disease.  He is a current smoker of a pack a day since teenage years.  He has never previously been diagnosed with lung disease.  He denies any chronic shortness of breath wheezing or cough.  He has never previously had a lung function test.  He drives a school bus.  Never previously tested for sleep apnea.  He denies snoring or daytime sleepiness.  He has diabetes mellitus.  He denies previous stroke or kidney disease.    Sylvain Amador  reports that he does not drink alcohol.,  reports that he has been smoking.  He has been smoking about 0.50 packs per day. He has quit using smokeless tobacco.     Past Hx:  has a past medical history of Atherosclerosis of native coronary artery of native heart without angina pectoris; CAD (coronary artery disease); Chest pain; Coronary atherosclerosis of native coronary vessel; Hyperlipidemia; Hypertension; Intermittent claudication (CMS/[facility]); and PAD (peripheral artery disease) (CMS/[facility]).  Surg Hx:  has a past surgical history that includes Vasectomy; Cardiac catheterization (11/2015); Rhinoplasty (1975); Coronary angioplasty with stent (2015);  and Arteriogram Aortic (Left, 8/2/2018).  FH: family history includes Cancer in his brother and father; Diabetes in his mother; Hypertension in his mother; Pancreatic cancer in his mother.  SH:  reports that he has been smoking.  He has been smoking about 0.50 packs per day. He has quit using smokeless tobacco. He reports that he does not drink alcohol or use drugs.    Prescriptions Prior to Admission   Medication Sig Dispense Refill Last Dose   • aspirin 81 MG EC tablet Take 81 mg by mouth 2 (Two) Times a Day.   8/1/2018 at Unknown time   • atorvastatin (LIPITOR) 80 MG tablet TAKE 1 TABLET BY MOUTH EVERY NIGHT. 30 tablet 0 8/1/2018 at Unknown time   • cilostazol (PLETAL) 100 MG tablet Take 1 tablet by mouth 2 (Two) Times a Day.  3 8/1/2018 at Unknown time   • lisinopril (PRINIVIL,ZESTRIL) 5 MG tablet TAKE 1 TABLET BY MOUTH DAILY. 90 tablet 0 8/1/2018 at Unknown time   • lisinopril (PRINIVIL,ZESTRIL) 5 MG tablet TAKE 1 TABLET BY MOUTH DAILY. 90 tablet 1 8/1/2018 at Unknown time   • metoprolol tartrate (LOPRESSOR) 25 MG tablet TAKE 1/2 TABLET BY MOUTH TWICE DAILY. 90 tablet 3 8/1/2018 at Unknown time     Allergies   Allergen Reactions   • No Known Drug Allergy        Review of Systems   Constitutional: Negative for chills and fever.   HENT: Negative for congestion, postnasal drip, sore throat and trouble swallowing.    Eyes: Negative for redness and visual disturbance.   Respiratory: Negative for cough, shortness of breath and wheezing.    Cardiovascular: Negative for chest pain, palpitations and leg swelling.   Gastrointestinal: Positive for abdominal pain. Negative for diarrhea, nausea and vomiting.   Endocrine: Negative for cold intolerance and heat intolerance.   Genitourinary: Negative for frequency and urgency.   Musculoskeletal: Negative for arthralgias and back pain.   Skin: Negative for pallor and rash.   Neurological: Negative for seizures and headaches.   Psychiatric/Behavioral: Negative for behavioral  problems. The patient is not nervous/anxious.      Vital Signs past 24hrs  BP range: BP: ()/(61-82) 93/61  Pulse range: Heart Rate:  [61-91] 85  Resp rate range: Resp:  [16-20] 18  Temp range: Temp (24hrs), Av °F (36.7 °C), Min:97.4 °F (36.3 °C), Max:98.7 °F (37.1 °C)    Oxygen range: SpO2:  [87 %-95 %] 94 %; Flow (L/min):  [1-5] 2;   Device (Oxygen Therapy): nasal cannula  92.4 kg (203 lb 9.6 oz); Body mass index is 27.61 kg/m².  I/O this shift:  In: 720 [P.O.:720]  Out: 1550 [Urine:1550]    Adult male who is in no acute distress.  Previously on 4 L oxygen and now on 2 L.  Pupils equal and reactive to light.  Oropharynx class II Mallampati airway.  Wide oropharynx.  Normal dentition.  No posterior pharyngeal discharge.  Mucosa normal.  Nasopharynx without discharge septum midline.  JVP not elevated.  Trachea midline thyroid not enlarged.  Lungs reveal somewhat diminished breath sounds.  I do not hear any wheezing.  No crackles heard.  Anteriorly as well as lung fields are clear.  Percussion note resonant.  Chest expansion equal no chest wall deformity or tenderness.  Heart examination S1-S2 present rhythm regular.  No murmurs.  No edema in lower extremities noted.  Abdomen is somewhat obese but soft.  Bowel sounds are present.  Mild tenderness is noted by the patient.  He feels this pain is better with the stents were placed.  He states he still has this abdominal pain though it is improving.  He also has pain at the groin insertion sites of the percutaneous sheets.  No hematoma seen.  Peripheral pulses palpated.  No cyanosis clubbing.  Patient moves all 4 extremities.  Sensory motor intact.  No cervical, axillary, inguinal adenopathy.    Results Review:    I have reviewed the laboratory and imaging data from current admission. My annotations are as noted in assessment and plan.    Medication Review:  I have reviewed the current MAR. My annotations are as noted in assessment and plan.    Plan   [facility]  Problems  Acute hypoxic respiratory failure  Current smoker  Atelectasis bibasilar seen on CT abdomen  Postoperative from percutaneous repair of AAA  Relevant Medical Diagnoses  Diabetes mellitus  Coronary artery disease  Postop renal infarcts    Plan of Treatment  Hypoxia is likely due to atelectasis.  CT of the abdomen prior to when after surgery was reviewed.  Bibasilar atelectasis is evident.  Continue use of net treatments as ordered by you.  Encouraged patient to use incentive spirometer.  He was previously receiving IV pain medicines.  Limitation of narcotic pain medicines may help in improving atelectasis.    Patient is a current smoker.  I do not hear any wheezing but air entry may be somewhat diminished.  He would benefit from outpatient evaluation of his lung functions and treatment of any COPD if evident.    I note a history of coronary artery disease.  I will order some troponins to be checked with next labs.  There is no evidence of ongoing acute coronary symptoms such as chest pain etc.    Walking oximetry ordered for tomorrow morning to evaluate improvement and hypoxia and to see if patient can be discharged on room air.    Part of this note may be an electronic transcription/translation of spoken language to printed text using the Dragon Dictation System.

## 2018-08-03 NOTE — NURSING NOTE
Patient still requiring 3.5 liters of oxygen via NC. Dr. Huynh aware. Not being discharged at this time

## 2018-08-04 VITALS
SYSTOLIC BLOOD PRESSURE: 112 MMHG | HEART RATE: 91 BPM | WEIGHT: 203.6 LBS | HEIGHT: 72 IN | DIASTOLIC BLOOD PRESSURE: 73 MMHG | OXYGEN SATURATION: 94 % | BODY MASS INDEX: 27.58 KG/M2 | RESPIRATION RATE: 16 BRPM | TEMPERATURE: 99.2 F

## 2018-08-04 LAB
CREAT BLD-MCNC: 1.1 MG/DL (ref 0.76–1.27)
DEPRECATED RDW RBC AUTO: 49.2 FL (ref 37–54)
ERYTHROCYTE [DISTWIDTH] IN BLOOD BY AUTOMATED COUNT: 14.2 % (ref 11.5–14.5)
GFR SERPL CREATININE-BSD FRML MDRD: 69 ML/MIN/1.73
GLUCOSE BLDC GLUCOMTR-MCNC: 119 MG/DL (ref 70–130)
HCT VFR BLD AUTO: 37.4 % (ref 40.4–52.2)
HGB BLD-MCNC: 11.7 G/DL (ref 13.7–17.6)
MCH RBC QN AUTO: 29.7 PG (ref 27–32.7)
MCHC RBC AUTO-ENTMCNC: 31.3 G/DL (ref 32.6–36.4)
MCV RBC AUTO: 94.9 FL (ref 79.8–96.2)
PLATELET # BLD AUTO: 92 10*3/MM3 (ref 140–500)
PMV BLD AUTO: 10.6 FL (ref 6–12)
RBC # BLD AUTO: 3.94 10*6/MM3 (ref 4.6–6)
TROPONIN T SERPL-MCNC: <0.01 NG/ML (ref 0–0.03)
WBC NRBC COR # BLD: 7.27 10*3/MM3 (ref 4.5–10.7)

## 2018-08-04 PROCEDURE — 82565 ASSAY OF CREATININE: CPT | Performed by: SURGERY

## 2018-08-04 PROCEDURE — 84484 ASSAY OF TROPONIN QUANT: CPT | Performed by: INTERNAL MEDICINE

## 2018-08-04 PROCEDURE — 85027 COMPLETE CBC AUTOMATED: CPT | Performed by: SURGERY

## 2018-08-04 PROCEDURE — 94618 PULMONARY STRESS TESTING: CPT

## 2018-08-04 PROCEDURE — 25010000002 ENOXAPARIN PER 10 MG: Performed by: SURGERY

## 2018-08-04 PROCEDURE — 94799 UNLISTED PULMONARY SVC/PX: CPT

## 2018-08-04 PROCEDURE — 82962 GLUCOSE BLOOD TEST: CPT

## 2018-08-04 RX ADMIN — CILOSTAZOL 100 MG: 100 TABLET ORAL at 10:03

## 2018-08-04 RX ADMIN — OXYCODONE HYDROCHLORIDE AND ACETAMINOPHEN 1 TABLET: 5; 325 TABLET ORAL at 00:23

## 2018-08-04 RX ADMIN — LISINOPRIL 5 MG: 10 TABLET ORAL at 10:03

## 2018-08-04 RX ADMIN — ENOXAPARIN SODIUM 40 MG: 40 INJECTION SUBCUTANEOUS at 10:07

## 2018-08-04 RX ADMIN — FAMOTIDINE 20 MG: 20 TABLET, FILM COATED ORAL at 10:04

## 2018-08-04 RX ADMIN — METOPROLOL TARTRATE 12.5 MG: 25 TABLET ORAL at 10:00

## 2018-08-04 RX ADMIN — IPRATROPIUM BROMIDE AND ALBUTEROL SULFATE 3 ML: .5; 3 SOLUTION RESPIRATORY (INHALATION) at 07:02

## 2018-08-04 RX ADMIN — ASPIRIN 81 MG: 81 TABLET, DELAYED RELEASE ORAL at 10:03

## 2018-08-04 NOTE — DISCHARGE INSTR - ACTIVITY
[facility]  Geoff Baird, Don Thorpe Rachel, Scherrer Thomas  [address], [address]  [phone]    Endovascular Aneurysm Repair    Please refer to the following instructions for your post-procedure care.  Your surgeon and/or nurse will discuss any changes with you.    Activity  Avoid lifting more than five pounds (a gallon of milk) until after your first post-operative visit. You are encouraged to walk as much as you can. You can slowly return to normal activities but must avoid strenuous activity and heavy lifting until your doctor tells you it's  OK. Heavy lifting can hurt the incision and cause a problems. Avoid activities such as vacuuming, shoveling snow or swinging a golf club.     It is normal to feel tired for several weeks after your surgery. Do not drive until your doctor gives the OK and you are no longer taking prescription pain medications. It is also normal to have difficulty with sleep habits, eating, and bowel movements after surgery. These will go away with time.    Bathing/Showering  You may shower after you go home. Do not remove the bandages unless they begin to peel off. Do not soak in a bathtub, hot tub, or swim until the incision heals completely and it has been approved by your surgeon . Shower every day. Clean your incision with mild soap and water. Pat the area dry with a clean towel. You do not need a bandage unless otherwise instructed. Do not apply any ointments or creams to your incision.    Diet  Resume your normal diet. There are no special food restrictions following this procedure. A low fat/low cholesterol diet is recommended for all patients with  vascular disease.    After your aneurysm repair, it's normal to have a decrease in appetite. It's best to eat small, frequent meals over the course of the day. Call the office if you find that you are unable to eat even small meals.    Medications  Resume taking all of your medications unless your  doctor or nurse practitioner tells you not to. If your incision is causing pain, you may take over-the-counter pain relievers such as acetaminophen (Tylenol®).    If you were prescribed a stronger pain medication, please be aware these medications can cause nausea and constipation. Prevent nausea by taking the medication with a snack or meal. Avoid constipation by drinking plenty of fluids and eating foods  with a high amount of fiber, such as fruits, vegetables, and grains.    Take 100mg of the over-the-counter stool softener Colace® twice a day to help with constipation. Call the office if you continue to have constipation despite taking  the stool softener. A laxative, such as Milk of Magnesia, may be recommended for you at this time. Do not take a laxative unless your surgeon or nurse practitioner tells  you it's OK. Do not take Tylenol if you are taking stronger pain  medications (such as Percocet®).    Please call us immediately for any of the following conditions  • Severe or worsening pain in your legs or feet or in your abdomen back or chest  • Increased pain, redness, drainage (pus) from your incision site   • Increased abdominal pain, bloating, nausea, vomiting, or persistent diarrhea  • Fever of 101 degrees or higher  • Swelling in your leg(s)    Reduce your risk of vascular disease  • Stop smoking  • Manage your cholesterol  • Maintain a desired weight  • Control your diabetes  • Keep your blood pressure down    If you have any questions, please call the office  [phone]

## 2018-08-04 NOTE — PLAN OF CARE
Problem: Patient Care Overview  Goal: Plan of Care Review  Outcome: Ongoing (interventions implemented as appropriate)   08/04/18 0429   Coping/Psychosocial   Plan of Care Reviewed With patient   OTHER   Outcome Summary VSS. Oxygen at 2 liters. Pain well controlled with PO pain meds. Bilateral groin incisions c/d/i. Good pulses with doppler. Possible DC in AM.   Plan of Care Review   Progress improving     Goal: Individualization and Mutuality  Outcome: Ongoing (interventions implemented as appropriate)    Goal: Discharge Needs Assessment  Outcome: Ongoing (interventions implemented as appropriate)    Goal: Interprofessional Rounds/Family Conf  Outcome: Ongoing (interventions implemented as appropriate)      Problem: Pain, Acute (Adult)  Goal: Acceptable Pain Control/Comfort Level  Outcome: Ongoing (interventions implemented as appropriate)      Problem: Fall Risk (Adult)  Goal: Absence of Fall  Outcome: Ongoing (interventions implemented as appropriate)      Problem: Skin Injury Risk (Adult)  Goal: Skin Health and Integrity  Outcome: Ongoing (interventions implemented as appropriate)

## 2018-08-04 NOTE — PROGRESS NOTES
Randolph Hogan MD                          [phone]      Patient ID:    Name:  Sylvain Amador    MRN:  [id number]    1960   57 y.o.  male            Patient Care Team:  Justina Smith APRN as PCP - General  Provider, No Known as PCP - Family Medicine      CC/Reason for visit:     Subjective: Pt seen and examined this AM. No acute overnight events noted. Doing better.     ROS:   Denies any fevers/ chills/ CP/ palpitations/ Nausea/ vomiting/ Diarrhoea  No Worsening cough or SOA.     Objective     Vital Signs past 24hrs    BP range: BP: (109-112)/(58-73) 112/73  Pulse range: Heart Rate:  [73-93] 91  Resp rate range: Resp:  [16-20] 16  Temp range: Temp (24hrs), Av.6 °F (37 °C), Min:98.1 °F (36.7 °C), Max:99.2 °F (37.3 °C)      Ventilator/Non-Invasive Ventilation Settings     None          Device (Oxygen Therapy): room air       92.4 kg (203 lb 9.6 oz); Body mass index is 27.61 kg/m².      Intake/Output Summary (Last 24 hours) at 18 1841  Last data filed at 18 0836   Gross per 24 hour   Intake              240 ml   Output              650 ml   Net             -410 ml       Exam:    GEN:  No respiratory distress, appears stated age  EYES:   EOM-i, anicteric sclera bilat  ENT:    External ears/nose normal, OP clear  NECK:  Supple, midline trachea, No JVD or cervical LApathy  LUNGS: Bilateral breath sounds heard, Dec BS @ bases  CV:  Regular rate and rhythm, No murmur  ABD:  Non tender, no distended, no palpable liver or masses  EXT:  No cyanosis or clubbing, no peripheral/sacral edema    Scheduled meds:      IV meds:                        No current facility-administered medications for this encounter.     Data Review:        Results from last 7 days  Lab Units 18  0555 18  0531 18  2112   SODIUM mmol/L  --  137 138   POTASSIUM mmol/L  --  4.0 3.9   CHLORIDE mmol/L  --  103 102   CO2 mmol/L  --  20.8* 22.7   BUN mg/dL  --  13 11    CREATININE mg/dL 1.10 0.99 0.85   CALCIUM mg/dL  --  7.5* 8.9   BILIRUBIN mg/dL  --   --  0.3   ALK PHOS U/L  --   --  70   ALT (SGPT) U/L  --   --  15   AST (SGOT) U/L  --   --  10   GLUCOSE mg/dL  --  128* 107*   WBC 10*3/mm3 7.27 10.44 10.35   HEMOGLOBIN g/dL 11.7* 12.2* 15.9   PLATELETS 10*3/mm3 92* 108* 150       Lab Results   Component Value Date    CALCIUM 7.5 (L) 08/03/2018               Results from last 7 days  Lab Units 08/04/18  0555   TROPONIN T ng/mL <0.010       Results Review:    I have reviewed the available laboratory results and reviewed the chest imaging personally    Imaging Results (all)     Procedure Component Value Units Date/Time    XR Chest PA & Lateral [[id number]] Collected:  08/03/18 1153     Updated:  08/03/18 1231    Narrative:       PA AND LATERAL CHEST     HISTORY: Increased oxygen demand. History of hypertension.     COMPARISON: CT angiogram abdominal aorta 08/02/2018. CT abdomen and  pelvis 08/01/2018, PA and lateral chest 10/20/2014.     FINDINGS: There are increased basilar opacities with basilar  consolidation associated with atelectasis or infiltrate. Lung volumes  are diminished. The mid to upper lungs are clear and there is no  evidence for pulmonary edema or pneumothorax or pleural effusion.       Impression:       Diminished lung volumes with increased basilar opacity  associated with atelectasis or infiltrate. No evidence for pulmonary  edema or pleural effusion or pneumothorax.     This report was finalized on 8/3/2018 12:28 PM by Dr. Josh Ornelas M.D.       CT Angiogram Abdomen Pelvis With & Without Contrast [[id number]] Collected:  08/02/18 1820     Updated:  08/02/18 1833    Narrative:       CT ANGIOGRAM OF THE ABDOMEN AND PELVIS WITH AND WITHOUT CONTRAST.  MULTIPLE CORONAL, SAGITTAL, AND 3-D RECONSTRUCTIONS.     HISTORY: Urgent endovascular aortic aneurysm repair today with postop  abdominal pain     TECHNIQUE: Radiation dose reduction techniques were utilized,  including  automated exposure control and exposure modulation based on body size.   CT angiogram of the chest was performed. Multiple coronal, sagittal, and  3-D reconstruction images were obtained.      COMPARISON: CT abdomen and pelvis 8/1/2018     FINDINGS:      Bibasilar atelectasis is present.     Post surgical changes from recent endovascular repair of abdominal  aortic aneurysm is present. Air is present within the excluded,  thrombosed portions of the aorta.     A vascular stent graft is present within the abdominal aorta which  begins approximately at the level of the renal arteries and extends into  the bilateral common iliac as well as the left external iliac arteries.  The stents are patent. There continues to be short segment of moderate  to severe stenosis involving the left internal iliac artery proximally  with reconstitution distally. There are no findings of contrast  extravasation in the mesentery or within the excluded aneurysm sac. The  aneurysm sac is stable in size, measuring up to 4.5 cm, as before.     There is mild stenosis of the origin of the celiac artery. The superior  mesenteric artery is widely patent.     A single right renal artery arises off of the aorta and is widely  patent. A single left renal artery arises off the aorta with short  segment severe stenosis of its proximal portion which was present on  preoperative scanning.     While the contrast timing of this examination does not allow for  detailed evaluation of the solid organs, the liver, pancreas, spleen and  adrenal glands have normal appearances for the phase of contrast. There  are alternating wedge-shaped and spotted areas of hypoenhancement within  the left renal cortex which is new since 8/1/2018. There are a few  wedge-shaped areas of hypoenhancement within the right kidney of  hypoenhancement within the interpolar region of the right kidney. The  atrophic segment within the inferior pole of the right kidney  is  unchanged. There is a left renal cyst in the interpolar region.     Vicarious excretion of contrast in the gallbladder as well as persistent  nephrograms within the bilateral kidneys, left greater than right on  noncontrast imaging suggestive of renal insufficiency.     A Maldonado catheter terminates in the bladder.     Colonic diverticulosis is present.     No suspicious lytic or blastic osseous lesions are present.       Impression:       1.  Findings of wedge-shaped and bandlike areas of hypoenhancement  primarily within the the left renal cortex with a few in the right  kidney which are new since CT 8/1/2018. Findings are most suggestive of  renal infarcts given the patient's history. While considered less  likely, polynephritis could've the similar and correlation with  patient's clinical symptoms is recommended.   2.  Postsurgical changes from a recent endovascular repair of abdominal  aortic aneurysm with graft remaining patent as above.  3.  Vicarious excretion of contrast in the gallbladder suggestive of  renal insufficiency.  4.  Moderate to severe atherosclerosis as above     This report was finalized on 8/2/2018 6:30 PM by Dr. Padilla Leblanc M.D.       Arteriogram ([employer]) [[id number]] Resulted:  08/02/18 1332     Updated:  08/02/18 1332    Narrative:       This procedure was auto-finalized with no dictation required.    CT Abdomen Pelvis With Contrast [[id number]] Collected:  08/01/18 2212     Updated:  08/01/18 2218    Narrative:       CT SCANS ABDOMEN AND PELVIS WITH IV CONTRAST.     HISTORY: lower abdominal pain radiating to back, hx 4cm AAA     COMPARISON: 11/12/2014.     TECHNIQUE: Radiation dose reduction techniques were utilized, including  automated exposure control and exposure modulation based on body size.  Axial images were obtained from the lung bases to the symphysis pubis  with IV contrast only.. Oral contrast was not administered per request.     FINDINGS ABDOMEN CT:  There is some  focal atrophy of the lower pole  right kidney with diminished enhancement which appears chronic but has  developed since the comparison examination. Left renal cyst has enlarged  slightly now measuring 3.7 cm, previously 3.1 cm. Remaining solid organs  have an unremarkable appearance. Normal appendix. There is extensive  colonic diverticulosis. The GI tract not opacified for assessment but  non obstructive in appearance. No ascites.     FINDINGS PELVIS CT:  Infrarenal aortic aneurysm has increased in size  now measuring 4.5 x 4.4 cm, previously 3.5 x 3.3 cm. As before, it  involves the bifurcation and iliacs, the left common iliac now measuring  3 cm, previously 2 cm. Right common iliac now measuring 2.5 cm,  previously 2.0 cm. Extensive plaque is present within the aneurysmal  segments but no evidence of leak or rupture at this time.                Impression:       IMPRESSION :   1. Renal findings as discussed.  2. Extensive colonic diverticulosis.  3. Enlarging aortoiliac aneurysms as discussed, no leak or rupture        This report was finalized on 8/1/2018 10:15 PM by Carlton Rios M.D.               ASSESSMENT:   Acute hypoxic respiratory failure; resolved  Current smoker  Atelectasis bibasilar seen on CT abdomen  Postoperative from percutaneous repair of AAA  Diabetes mellitus  Coronary artery disease  Postop renal infarcts    PLAN:  No o2 needs this am. Walk ox before dc rec'd.  Smoking cessation strictly rec'd.   O/p f/u offered for eval for copd  Ok to dc    I have discussed my findings and recommendations with patient, family and nursing staff.     Randolph Hogan MD  8/4/2018

## 2018-08-04 NOTE — PROGRESS NOTES
Exercise Oximetry    Patient Name:Sylvain Amador   MRN: [id number]   Date: 08/04/18             ROOM AIR BASELINE   SpO2% 96   Heart Rate 88   Blood Pressure 112/73     EXERCISE ON ROOM AIR SpO2% EXERCISE ON O2 @  LPM SpO2%   1 MINUTE 93 1 MINUTE    2 MINUTES 91 2 MINUTES    3 MINUTES 89 3 MINUTES    4 MINUTES 91 4 MINUTES    5 MINUTES 93 5 MINUTES    6 MINUTES 94 6 MINUTES               Distance Walked                       150 ft  Distance Walked   Dyspnea (Serena Scale)   Dyspnea (Serena Scale)   Fatigue (Serena Scale)   Fatigue (Serena Scale)   SpO2% Post Exercise               94 SpO2% Post Exercise   HR Post Exercise                      88 HR Post Exercise   Time to Recovery                      2 mins  Time to Recovery     Comments: Pt walked on RA without incident.

## 2018-08-04 NOTE — DISCHARGE SUMMARY
Name: Sylvain Amador ADMIT: 2018   : 1960  PCP: Justina Smith APRN    MRN: [id number] LOS: 3 days   AGE/SEX: 57 y.o. male  ROOM: [facility]     Date of Admission: 2018  Date of Discharge:  2018    PCP: Justina Smith APRN      DISCHARGE DIAGNOSIS  Active Hospital Problems    Diagnosis Date Noted   • Abdominal aortic aneurysm (AAA) without rupture (CMS/[facility]) [I71.4] 2018      Resolved Hospital Problems    Diagnosis Date Noted Date Resolved   No resolved problems to display.       SECONDARY DIAGNOSES  Past Medical History:   Diagnosis Date   • Atherosclerosis of native coronary artery of native heart without angina pectoris    • CAD (coronary artery disease)    • Chest pain    • Coronary atherosclerosis of native coronary vessel    • Hyperlipidemia    • Hypertension    • Intermittent claudication (CMS/[facility])    • PAD (peripheral artery disease) (CMS/[facility])     of lower extremities       CONSULTS   Consults     Date and Time Order Name Status Description    8/3/2018 1543 Inpatient Pulmonology Consult Completed     2018 2224 Surgery (on-call MD unless specified) Completed           PROCEDURES PERFORMED    Date:  2018 , duplex ultrasound guided percutaneous access of the common femoral arteries bilaterally; abdominal aortogram; placement of modular bifurcated aortic stent graft with iliac branch endoprosthesis on the left and iliac limb extension on the right    HOSPITAL COURSE  Patient is a 57 y.o. male presented to [facility] admitted for infrarenal abdominal aortic aneurysm with abdominal pain.  Please see the admitting history and physical for further details.  Patient was admitted and because of his abdominal pain with no other etiology of the abdominal pain his aneurysm, he was taken to the operating room where he underwent the above-mentioned procedure.  His postoperative course initially postoperatively was complicated by increasing oxygen requirements.  " This improved significantly with breathing treatments, activity, and pulmonary toilet.  The following day he was on no oxygen and he had satisfactory oxygen saturation levels.  He was ambulating without difficulty.  Both feet are satisfactorily perfused.  No ischemic changes noted.  He was therefore discharged home.      VITAL SIGNS  /73 (BP Location: Right arm, Patient Position: Lying)   Pulse 91   Temp 99.2 °F (37.3 °C) (Oral)   Resp 16   Ht 182.9 cm (72\")   Wt 92.4 kg (203 lb 9.6 oz)   SpO2 94%   BMI 27.61 kg/m²   Objective:  His abdomen was soft, nontender, nondistended.  No palpable aneurysm.  Both feet are satisfactorily perfused with no ischemic changes.  CONDITION ON DISCHARGE  Stable.      DISCHARGE DISPOSITION   Home or Self Care      DISCHARGE MEDICATIONS     Discharge Medications      New Medications      Instructions Start Date   HYDROcodone-acetaminophen 5-325 MG per tablet  Commonly known as:  NORCO   2 tablets, Oral, Every 6 Hours PRN         Continue These Medications      Instructions Start Date   aspirin 81 MG EC tablet   81 mg, Oral, 2 Times Daily      atorvastatin 80 MG tablet  Commonly known as:  LIPITOR   80 mg, Oral, Nightly      cilostazol 100 MG tablet  Commonly known as:  PLETAL   1 tablet, Oral, 2 Times Daily      lisinopril 5 MG tablet  Commonly known as:  PRINIVIL,ZESTRIL   TAKE 1 TABLET BY MOUTH DAILY.      lisinopril 5 MG tablet  Commonly known as:  PRINIVIL,ZESTRIL   TAKE 1 TABLET BY MOUTH DAILY.      metoprolol tartrate 25 MG tablet  Commonly known as:  LOPRESSOR   TAKE 1/2 TABLET BY MOUTH TWICE DAILY.              Activity Instructions     Discharge Activity Restrictions       No driving of a vehicle or if taking any narcotic analgesics.  No lifting more than 20 pounds for 1 week.  Patient may begin showering today      No future appointments.  Additional Instructions for the Follow-ups that You Need to Schedule     Discharge Follow-up with Specified Provider: Have " patient call [phone] to arrange follow-up with Dr. Huynh in 10 days    As directed      To:  Have patient call [phone] to arrange follow-up with Dr. Huynh in 10 days           Follow-up Information     Justina Smith APRN Follow up.    Specialty:  Family Medicine  Contact information:  [address]  [address]  [facility] [address]  [phone]             Provider, No Known Follow up.    Contact information:  [facility] [address]             Arturo Huynh MD Follow up in 2 week(s).    Specialty:  Vascular Surgery  Contact information:  [address] [facility] [address]  [facility] [address]  [phone]                   TEST  RESULTS PENDING AT DISCHARGE       [place], Post Op [facility]    [place] Seferino Tellez Jr., MD  Office Number [phone]    18  8:36 AM

## 2018-08-04 NOTE — PROGRESS NOTES
The patient is status post endovascular aortic stent graft repair of an aneurysm.  This was done urgently because of abdominal pain and back pain.  He appears to have some mild embolic changes to the feet.  This has improved.  O2 requirements  increased initially postoperatively.  He's been evaluated by the pulmonary service for this.  Currently he is on no oxygen at all.  His O2 sats are between 90-95%.  He has ambulated with no issues or shortness of breath    Hemoglobin yesterday 11.7 and blood sugars are under better control.    Puncture sites both groins are healing satisfactorily.  No evidence of hematoma or pseudoaneurysm.  Abdomen is soft, nontender, nondistended, no palpable aneurysm.  No palpable pulses noted distally but both feet are warm and satisfactorily perfused.  No evidence of ischemic changes on either foot.  There is satisfactory capillary refill.    He certainly appears to be back to his baseline from a pulmonary standpoint.  He appears satisfactory for discharge today.  Instructions have been given.

## 2018-08-05 ENCOUNTER — READMISSION MANAGEMENT (OUTPATIENT)
Dept: CALL CENTER | Facility: HOSPITAL | Age: 58
End: 2018-08-05

## 2018-08-06 ENCOUNTER — READMISSION MANAGEMENT (OUTPATIENT)
Dept: CALL CENTER | Facility: HOSPITAL | Age: 58
End: 2018-08-06

## 2018-08-06 NOTE — PAYOR COMM NOTE
"Sylvain Amador (57 y.o. Male)     Date of Birth Social Security Number Address Home Phone MRN    1960  [id number] [place], [place] [id number] [phone] [id number]    G[employer] Marital Status          None        Admission Date Admission Type Admitting Provider Attending Provider Department, Room/Bed    8/1/18 Emergency Arturo Huynh MD  [facility] 5 EAST, 551/1    Discharge Date Discharge Disposition Discharge Destination        8/4/2018 Home or Self Care              Attending Provider:  (none)   Allergies:  No Known Drug Allergy    Isolation:  None   Infection:  None   Code Status:  Prior    Ht:  182.9 cm (72\")   Wt:  92.4 kg (203 lb 9.6 oz)    Admission Cmt:  None   Principal Problem:  None                Active Insurance as of 8/1/2018     Primary Coverage     Payor Plan Insurance Group Employer/Plan Group    [facility] [employer] CR[employer] [id number]     Payor Plan Address Payor Plan Phone Number Effective From Effective To    [address] [phone] 10/1/2015     [facility] [id number]       Subscriber Name Subscriber Birth Date Member ID       SYLVAIN AMADOR 1960 [id number]                 Emergency Contacts      (Rel.) Home Phone Work Phone Mobile Phone    Germania Hollins (Spouse) [phone] -- [phone]          "

## 2018-08-06 NOTE — OUTREACH NOTE
General Surgery Week 1 Survey      Responses   Facility patient discharged from?  [facility]   Does the patient have one of the following disease processes/diagnoses(primary or secondary)?  General Surgery   Is there a successful TCM telephone encounter documented?  No   Week 1 attempt successful?  No   Unsuccessful attempts  Attempt 1          Jose L Land RN

## 2018-08-06 NOTE — PROGRESS NOTES
Case Management Discharge Note    Final Note: Orders reviewed.  Pt discharged home, no known needs.  JUDITH Mattson    Destination     No service has been selected for the patient.      Durable Medical Equipment     No service has been selected for the patient.      Dialysis/Infusion     No service has been selected for the patient.      Home Medical Care     No service has been selected for the patient.      Social Care     No service has been selected for the patient.        Other: Other (private auto)    Final Discharge Disposition Code: 01 - home or self-care

## 2018-08-06 NOTE — OUTREACH NOTE
Prep Survey      Responses   Facility patient discharged from?  [facility]   Is patient eligible?  Yes   Discharge diagnosis  AAA repair - femoral artery approach   Does the patient have one of the following disease processes/diagnoses(primary or secondary)?  General Surgery   Does the patient have Home health ordered?  No   Is there a DME ordered?  No   Comments regarding appointments  pt to schedule   Prep survey completed?  Yes          Julianne Estrada RN

## 2018-08-07 ENCOUNTER — READMISSION MANAGEMENT (OUTPATIENT)
Dept: CALL CENTER | Facility: HOSPITAL | Age: 58
End: 2018-08-07

## 2018-08-07 NOTE — OUTREACH NOTE
General Surgery Week 1 Survey      Responses   Facility patient discharged from?  [facility]   Does the patient have one of the following disease processes/diagnoses(primary or secondary)?  General Surgery   Is there a successful TCM telephone encounter documented?  No   Week 1 attempt successful?  No   Unsuccessful attempts  Attempt 1          Jose L Land RN

## 2018-08-08 ENCOUNTER — READMISSION MANAGEMENT (OUTPATIENT)
Dept: CALL CENTER | Facility: HOSPITAL | Age: 58
End: 2018-08-08

## 2018-08-08 NOTE — OUTREACH NOTE
General Surgery Week 1 Survey      Responses   Facility patient discharged from?  [facility]   Does the patient have one of the following disease processes/diagnoses(primary or secondary)?  General Surgery   Is there a successful TCM telephone encounter documented?  No   Week 1 attempt successful?  No   Unsuccessful attempts  Attempt 3          Dimitri Otero, RN

## 2018-08-09 ENCOUNTER — READMISSION MANAGEMENT (OUTPATIENT)
Dept: CALL CENTER | Facility: HOSPITAL | Age: 58
End: 2018-08-09

## 2018-08-09 NOTE — OUTREACH NOTE
General Surgery Week 1 Survey      Responses   Facility patient discharged from?  [facility]   Does the patient have one of the following disease processes/diagnoses(primary or secondary)?  General Surgery   Is there a successful TCM telephone encounter documented?  No   Week 1 attempt successful?  No   Unsuccessful attempts  Attempt 4          Dimitri Otero, RN

## 2018-08-13 ENCOUNTER — TRANSCRIBE ORDERS (OUTPATIENT)
Dept: ADMINISTRATIVE | Facility: HOSPITAL | Age: 58
End: 2018-08-13

## 2018-08-13 DIAGNOSIS — I71.40 ABDOMINAL AORTIC ANEURYSM (AAA) WITHOUT RUPTURE (HCC): Primary | ICD-10-CM

## 2018-08-18 ENCOUNTER — READMISSION MANAGEMENT (OUTPATIENT)
Dept: CALL CENTER | Facility: HOSPITAL | Age: 58
End: 2018-08-18

## 2018-08-21 ENCOUNTER — READMISSION MANAGEMENT (OUTPATIENT)
Dept: CALL CENTER | Facility: HOSPITAL | Age: 58
End: 2018-08-21

## 2018-08-21 NOTE — OUTREACH NOTE
General Surgery Week 2 Survey      Responses   Facility patient discharged from?  [facility]   Does the patient have one of the following disease processes/diagnoses(primary or secondary)?  General Surgery   Week 2 attempt successful?  No   Unsuccessful attempts  Attempt 2          Eleanor Garnica RN

## 2018-08-23 ENCOUNTER — READMISSION MANAGEMENT (OUTPATIENT)
Dept: CALL CENTER | Facility: HOSPITAL | Age: 58
End: 2018-08-23

## 2018-09-04 ENCOUNTER — READMISSION MANAGEMENT (OUTPATIENT)
Dept: CALL CENTER | Facility: HOSPITAL | Age: 58
End: 2018-09-04

## 2018-09-04 NOTE — OUTREACH NOTE
General Surgery Week 3 Survey      Responses   Facility patient discharged from?  [facility]   Does the patient have one of the following disease processes/diagnoses(primary or secondary)?  General Surgery   Week 3 attempt successful?  No   Unsuccessful attempts  Attempt 1          Shayy Campos RN

## 2018-09-06 ENCOUNTER — READMISSION MANAGEMENT (OUTPATIENT)
Dept: CALL CENTER | Facility: HOSPITAL | Age: 58
End: 2018-09-06

## 2018-09-06 NOTE — OUTREACH NOTE
General Surgery Week 3 Survey      Responses   Facility patient discharged from?  [facility]   Does the patient have one of the following disease processes/diagnoses(primary or secondary)?  General Surgery   Week 3 attempt successful?  No   Unsuccessful attempts  Attempt 2          Patty Velarde RN

## 2018-10-31 ENCOUNTER — APPOINTMENT (OUTPATIENT)
Dept: CT IMAGING | Facility: HOSPITAL | Age: 58
End: 2018-10-31

## 2018-10-31 ENCOUNTER — HOSPITAL ENCOUNTER (INPATIENT)
Facility: HOSPITAL | Age: 58
LOS: 3 days | Discharge: HOME | End: 2018-11-03
Attending: EMERGENCY MEDICINE | Admitting: SURGERY

## 2018-10-31 DIAGNOSIS — R10.84 GENERALIZED ABDOMINAL PAIN: ICD-10-CM

## 2018-10-31 DIAGNOSIS — R93.422 ABNORMAL RADIOLOGIC FINDINGS ON DIAGNOSTIC IMAGING OF LEFT KIDNEY: Primary | ICD-10-CM

## 2018-10-31 PROBLEM — N28.0 RENAL INFARCT (HCC): Status: ACTIVE | Noted: 2018-10-31

## 2018-10-31 LAB
ALBUMIN SERPL-MCNC: 4.6 G/DL (ref 3.5–5.2)
ALBUMIN/GLOB SERPL: 1.4 G/DL
ALP SERPL-CCNC: 73 U/L (ref 39–117)
ALT SERPL W P-5'-P-CCNC: 14 U/L (ref 1–41)
ANION GAP SERPL CALCULATED.3IONS-SCNC: 14.2 MMOL/L
APTT PPP: 28.7 SECONDS (ref 22.7–35.4)
AST SERPL-CCNC: 15 U/L (ref 1–40)
BACTERIA UR QL AUTO: NORMAL /HPF
BASOPHILS # BLD AUTO: 0.02 10*3/MM3 (ref 0–0.2)
BASOPHILS NFR BLD AUTO: 0.2 % (ref 0–1.5)
BILIRUB SERPL-MCNC: 0.4 MG/DL (ref 0.1–1.2)
BILIRUB UR QL STRIP: NEGATIVE
BUN BLD-MCNC: 20 MG/DL (ref 8–23)
BUN/CREAT SERPL: 12.4 (ref 7–25)
CALCIUM SPEC-SCNC: 9.9 MG/DL (ref 8.6–10.5)
CHLORIDE SERPL-SCNC: 100 MMOL/L (ref 98–107)
CLARITY UR: CLEAR
CO2 SERPL-SCNC: 21.8 MMOL/L (ref 22–29)
COLOR UR: YELLOW
CREAT BLD-MCNC: 1.61 MG/DL (ref 0.76–1.27)
DEPRECATED RDW RBC AUTO: 45.9 FL (ref 37–54)
EOSINOPHIL # BLD AUTO: 0.03 10*3/MM3 (ref 0–0.7)
EOSINOPHIL NFR BLD AUTO: 0.2 % (ref 0.3–6.2)
ERYTHROCYTE [DISTWIDTH] IN BLOOD BY AUTOMATED COUNT: 14 % (ref 11.5–14.5)
GFR SERPL CREATININE-BSD FRML MDRD: 44 ML/MIN/1.73
GLOBULIN UR ELPH-MCNC: 3.4 GM/DL
GLUCOSE BLD-MCNC: 193 MG/DL (ref 65–99)
GLUCOSE UR STRIP-MCNC: ABNORMAL MG/DL
HCT VFR BLD AUTO: 46.8 % (ref 40.4–52.2)
HGB BLD-MCNC: 15 G/DL (ref 13.7–17.6)
HGB UR QL STRIP.AUTO: NEGATIVE
HOLD SPECIMEN: NORMAL
HOLD SPECIMEN: NORMAL
HYALINE CASTS UR QL AUTO: NORMAL /LPF
IMM GRANULOCYTES # BLD: 0.03 10*3/MM3 (ref 0–0.03)
IMM GRANULOCYTES NFR BLD: 0.2 % (ref 0–0.5)
INR PPP: 1.01 (ref 0.9–1.1)
KETONES UR QL STRIP: ABNORMAL
LEUKOCYTE ESTERASE UR QL STRIP.AUTO: NEGATIVE
LIPASE SERPL-CCNC: 26 U/L (ref 13–60)
LYMPHOCYTES # BLD AUTO: 1.37 10*3/MM3 (ref 0.9–4.8)
LYMPHOCYTES NFR BLD AUTO: 10.9 % (ref 19.6–45.3)
MCH RBC QN AUTO: 29 PG (ref 27–32.7)
MCHC RBC AUTO-ENTMCNC: 32.1 G/DL (ref 32.6–36.4)
MCV RBC AUTO: 90.3 FL (ref 79.8–96.2)
MONOCYTES # BLD AUTO: 0.81 10*3/MM3 (ref 0.2–1.2)
MONOCYTES NFR BLD AUTO: 6.4 % (ref 5–12)
NEUTROPHILS # BLD AUTO: 10.31 10*3/MM3 (ref 1.9–8.1)
NEUTROPHILS NFR BLD AUTO: 82.1 % (ref 42.7–76)
NITRITE UR QL STRIP: NEGATIVE
NT-PROBNP SERPL-MCNC: 353.1 PG/ML (ref 5–900)
PH UR STRIP.AUTO: 6 [PH] (ref 5–8)
PLATELET # BLD AUTO: 193 10*3/MM3 (ref 140–500)
PMV BLD AUTO: 11 FL (ref 6–12)
POTASSIUM BLD-SCNC: 4.1 MMOL/L (ref 3.5–5.2)
PROT SERPL-MCNC: 8 G/DL (ref 6–8.5)
PROT UR QL STRIP: ABNORMAL
PROTHROMBIN TIME: 13.1 SECONDS (ref 11.7–14.2)
RBC # BLD AUTO: 5.18 10*6/MM3 (ref 4.6–6)
RBC # UR: NORMAL /HPF
REF LAB TEST METHOD: NORMAL
SODIUM BLD-SCNC: 136 MMOL/L (ref 136–145)
SP GR UR STRIP: 1.02 (ref 1–1.03)
SQUAMOUS #/AREA URNS HPF: NORMAL /HPF
TROPONIN T SERPL-MCNC: <0.01 NG/ML (ref 0–0.03)
UROBILINOGEN UR QL STRIP: ABNORMAL
WBC NRBC COR # BLD: 12.57 10*3/MM3 (ref 4.5–10.7)
WBC UR QL AUTO: NORMAL /HPF
WHOLE BLOOD HOLD SPECIMEN: NORMAL
WHOLE BLOOD HOLD SPECIMEN: NORMAL

## 2018-10-31 PROCEDURE — 25010000002 MORPHINE PER 10 MG: Performed by: SURGERY

## 2018-10-31 PROCEDURE — 36415 COLL VENOUS BLD VENIPUNCTURE: CPT

## 2018-10-31 PROCEDURE — 25010000002 MORPHINE PER 10 MG

## 2018-10-31 PROCEDURE — 85610 PROTHROMBIN TIME: CPT | Performed by: NURSE PRACTITIONER

## 2018-10-31 PROCEDURE — 83690 ASSAY OF LIPASE: CPT | Performed by: EMERGENCY MEDICINE

## 2018-10-31 PROCEDURE — 25010000002 ONDANSETRON PER 1 MG: Performed by: SURGERY

## 2018-10-31 PROCEDURE — 85025 COMPLETE CBC W/AUTO DIFF WBC: CPT | Performed by: EMERGENCY MEDICINE

## 2018-10-31 PROCEDURE — 25010000002 ONDANSETRON PER 1 MG: Performed by: EMERGENCY MEDICINE

## 2018-10-31 PROCEDURE — 85730 THROMBOPLASTIN TIME PARTIAL: CPT | Performed by: NURSE PRACTITIONER

## 2018-10-31 PROCEDURE — 25010000002 HYDROMORPHONE 1 MG/ML SOLUTION: Performed by: NURSE PRACTITIONER

## 2018-10-31 PROCEDURE — 74174 CTA ABD&PLVS W/CONTRAST: CPT

## 2018-10-31 PROCEDURE — 0 IOPAMIDOL PER 1 ML: Performed by: PHYSICIAN ASSISTANT

## 2018-10-31 PROCEDURE — 93005 ELECTROCARDIOGRAM TRACING: CPT | Performed by: NURSE PRACTITIONER

## 2018-10-31 PROCEDURE — 93010 ELECTROCARDIOGRAM REPORT: CPT | Performed by: INTERNAL MEDICINE

## 2018-10-31 PROCEDURE — 83880 ASSAY OF NATRIURETIC PEPTIDE: CPT | Performed by: NURSE PRACTITIONER

## 2018-10-31 PROCEDURE — 99284 EMERGENCY DEPT VISIT MOD MDM: CPT

## 2018-10-31 PROCEDURE — 81001 URINALYSIS AUTO W/SCOPE: CPT | Performed by: EMERGENCY MEDICINE

## 2018-10-31 PROCEDURE — 80053 COMPREHEN METABOLIC PANEL: CPT | Performed by: EMERGENCY MEDICINE

## 2018-10-31 PROCEDURE — 84484 ASSAY OF TROPONIN QUANT: CPT | Performed by: NURSE PRACTITIONER

## 2018-10-31 RX ORDER — CILOSTAZOL 100 MG/1
100 TABLET ORAL 2 TIMES DAILY
Status: DISCONTINUED | OUTPATIENT
Start: 2018-10-31 | End: 2018-11-03 | Stop reason: HOSPADM

## 2018-10-31 RX ORDER — MORPHINE SULFATE 2 MG/ML
4 INJECTION, SOLUTION INTRAMUSCULAR; INTRAVENOUS ONCE
Status: COMPLETED | OUTPATIENT
Start: 2018-10-31 | End: 2018-10-31

## 2018-10-31 RX ORDER — SODIUM CHLORIDE 9 MG/ML
100 INJECTION, SOLUTION INTRAVENOUS CONTINUOUS
Status: DISCONTINUED | OUTPATIENT
Start: 2018-10-31 | End: 2018-11-02

## 2018-10-31 RX ORDER — ASPIRIN 81 MG/1
81 TABLET ORAL DAILY
Status: DISCONTINUED | OUTPATIENT
Start: 2018-11-01 | End: 2018-11-03 | Stop reason: HOSPADM

## 2018-10-31 RX ORDER — ATORVASTATIN CALCIUM 80 MG/1
80 TABLET, FILM COATED ORAL NIGHTLY
Status: DISCONTINUED | OUTPATIENT
Start: 2018-10-31 | End: 2018-11-03 | Stop reason: HOSPADM

## 2018-10-31 RX ORDER — HEPARIN SODIUM 10000 [USP'U]/100ML
15.9 INJECTION, SOLUTION INTRAVENOUS
Status: DISCONTINUED | OUTPATIENT
Start: 2018-10-31 | End: 2018-10-31

## 2018-10-31 RX ORDER — LISINOPRIL 5 MG/1
5 TABLET ORAL DAILY
Status: DISCONTINUED | OUTPATIENT
Start: 2018-11-01 | End: 2018-11-01

## 2018-10-31 RX ORDER — ONDANSETRON 4 MG/1
4 TABLET, FILM COATED ORAL EVERY 6 HOURS PRN
Status: DISCONTINUED | OUTPATIENT
Start: 2018-10-31 | End: 2018-11-03 | Stop reason: HOSPADM

## 2018-10-31 RX ORDER — MORPHINE SULFATE 2 MG/ML
INJECTION, SOLUTION INTRAMUSCULAR; INTRAVENOUS
Status: COMPLETED
Start: 2018-10-31 | End: 2018-10-31

## 2018-10-31 RX ORDER — MORPHINE SULFATE 10 MG/ML
4 INJECTION INTRAMUSCULAR; INTRAVENOUS; SUBCUTANEOUS ONCE
Status: DISCONTINUED | OUTPATIENT
Start: 2018-10-31 | End: 2018-10-31

## 2018-10-31 RX ORDER — SODIUM CHLORIDE 9 MG/ML
100 INJECTION, SOLUTION INTRAVENOUS CONTINUOUS
Status: DISCONTINUED | OUTPATIENT
Start: 2018-10-31 | End: 2018-11-01

## 2018-10-31 RX ORDER — MORPHINE SULFATE 10 MG/ML
4 INJECTION INTRAMUSCULAR; INTRAVENOUS; SUBCUTANEOUS
Status: DISCONTINUED | OUTPATIENT
Start: 2018-10-31 | End: 2018-11-01

## 2018-10-31 RX ORDER — ONDANSETRON 2 MG/ML
4 INJECTION INTRAMUSCULAR; INTRAVENOUS EVERY 6 HOURS PRN
Status: DISCONTINUED | OUTPATIENT
Start: 2018-10-31 | End: 2018-11-03 | Stop reason: HOSPADM

## 2018-10-31 RX ORDER — NALOXONE HCL 0.4 MG/ML
0.4 VIAL (ML) INJECTION
Status: DISCONTINUED | OUTPATIENT
Start: 2018-10-31 | End: 2018-11-01

## 2018-10-31 RX ORDER — HYDROCODONE BITARTRATE AND ACETAMINOPHEN 5; 325 MG/1; MG/1
2 TABLET ORAL EVERY 6 HOURS PRN
Status: DISCONTINUED | OUTPATIENT
Start: 2018-10-31 | End: 2018-11-02

## 2018-10-31 RX ORDER — LISINOPRIL 5 MG/1
5 TABLET ORAL DAILY
Status: DISCONTINUED | OUTPATIENT
Start: 2018-11-01 | End: 2018-10-31 | Stop reason: SDUPTHER

## 2018-10-31 RX ORDER — ONDANSETRON 4 MG/1
4 TABLET, ORALLY DISINTEGRATING ORAL EVERY 6 HOURS PRN
Status: DISCONTINUED | OUTPATIENT
Start: 2018-10-31 | End: 2018-11-03 | Stop reason: HOSPADM

## 2018-10-31 RX ORDER — ONDANSETRON 2 MG/ML
4 INJECTION INTRAMUSCULAR; INTRAVENOUS ONCE
Status: COMPLETED | OUTPATIENT
Start: 2018-10-31 | End: 2018-10-31

## 2018-10-31 RX ADMIN — MORPHINE SULFATE 4 MG: 2 INJECTION, SOLUTION INTRAMUSCULAR; INTRAVENOUS at 15:15

## 2018-10-31 RX ADMIN — MORPHINE SULFATE 4 MG: 10 INJECTION INTRAVENOUS at 23:18

## 2018-10-31 RX ADMIN — HYDROMORPHONE HYDROCHLORIDE 1 MG: 1 INJECTION, SOLUTION INTRAMUSCULAR; INTRAVENOUS; SUBCUTANEOUS at 17:19

## 2018-10-31 RX ADMIN — APIXABAN 5 MG: 5 TABLET, FILM COATED ORAL at 22:49

## 2018-10-31 RX ADMIN — ONDANSETRON 4 MG: 2 INJECTION INTRAMUSCULAR; INTRAVENOUS at 23:19

## 2018-10-31 RX ADMIN — IOPAMIDOL 95 ML: 755 INJECTION, SOLUTION INTRAVENOUS at 15:42

## 2018-10-31 RX ADMIN — METOPROLOL TARTRATE 12.5 MG: 25 TABLET ORAL at 22:49

## 2018-10-31 RX ADMIN — HYDROMORPHONE HYDROCHLORIDE 1 MG: 1 INJECTION, SOLUTION INTRAMUSCULAR; INTRAVENOUS; SUBCUTANEOUS at 19:31

## 2018-10-31 RX ADMIN — CILOSTAZOL 100 MG: 100 TABLET ORAL at 22:49

## 2018-10-31 RX ADMIN — ATORVASTATIN CALCIUM 80 MG: 80 TABLET, FILM COATED ORAL at 22:49

## 2018-10-31 RX ADMIN — SODIUM CHLORIDE 100 ML/HR: 9 INJECTION, SOLUTION INTRAVENOUS at 18:15

## 2018-10-31 RX ADMIN — ONDANSETRON 4 MG: 2 INJECTION INTRAMUSCULAR; INTRAVENOUS at 15:15

## 2018-11-01 LAB
ANION GAP SERPL CALCULATED.3IONS-SCNC: 10.5 MMOL/L
BUN BLD-MCNC: 22 MG/DL (ref 8–23)
BUN/CREAT SERPL: 13.3 (ref 7–25)
CALCIUM SPEC-SCNC: 8.6 MG/DL (ref 8.6–10.5)
CHLORIDE SERPL-SCNC: 98 MMOL/L (ref 98–107)
CO2 SERPL-SCNC: 21.5 MMOL/L (ref 22–29)
CREAT BLD-MCNC: 1.66 MG/DL (ref 0.76–1.27)
GFR SERPL CREATININE-BSD FRML MDRD: 42 ML/MIN/1.73
GLUCOSE BLD-MCNC: 162 MG/DL (ref 65–99)
GLUCOSE BLDC GLUCOMTR-MCNC: 140 MG/DL (ref 70–130)
POTASSIUM BLD-SCNC: 4.6 MMOL/L (ref 3.5–5.2)
SODIUM BLD-SCNC: 130 MMOL/L (ref 136–145)

## 2018-11-01 PROCEDURE — 82962 GLUCOSE BLOOD TEST: CPT

## 2018-11-01 PROCEDURE — 80048 BASIC METABOLIC PNL TOTAL CA: CPT | Performed by: SURGERY

## 2018-11-01 PROCEDURE — 84300 ASSAY OF URINE SODIUM: CPT | Performed by: INTERNAL MEDICINE

## 2018-11-01 PROCEDURE — 82570 ASSAY OF URINE CREATININE: CPT | Performed by: INTERNAL MEDICINE

## 2018-11-01 PROCEDURE — 84156 ASSAY OF PROTEIN URINE: CPT | Performed by: INTERNAL MEDICINE

## 2018-11-01 PROCEDURE — 25010000002 MORPHINE PER 10 MG: Performed by: SURGERY

## 2018-11-01 RX ORDER — NICOTINE POLACRILEX 4 MG
15 LOZENGE BUCCAL
Status: DISCONTINUED | OUTPATIENT
Start: 2018-11-01 | End: 2018-11-03 | Stop reason: HOSPADM

## 2018-11-01 RX ORDER — NALOXONE HCL 0.4 MG/ML
0.4 VIAL (ML) INJECTION
Status: DISCONTINUED | OUTPATIENT
Start: 2018-11-01 | End: 2018-11-03 | Stop reason: HOSPADM

## 2018-11-01 RX ORDER — DEXTROSE MONOHYDRATE 25 G/50ML
25 INJECTION, SOLUTION INTRAVENOUS
Status: DISCONTINUED | OUTPATIENT
Start: 2018-11-01 | End: 2018-11-03 | Stop reason: HOSPADM

## 2018-11-01 RX ORDER — MORPHINE SULFATE 10 MG/ML
4 INJECTION INTRAMUSCULAR; INTRAVENOUS; SUBCUTANEOUS
Status: DISCONTINUED | OUTPATIENT
Start: 2018-11-01 | End: 2018-11-03 | Stop reason: HOSPADM

## 2018-11-01 RX ADMIN — HYDROCODONE BITARTRATE AND ACETAMINOPHEN 2 TABLET: 5; 325 TABLET ORAL at 08:40

## 2018-11-01 RX ADMIN — SODIUM CHLORIDE 100 ML/HR: 9 INJECTION, SOLUTION INTRAVENOUS at 14:44

## 2018-11-01 RX ADMIN — MORPHINE SULFATE 4 MG: 10 INJECTION INTRAVENOUS at 18:10

## 2018-11-01 RX ADMIN — MORPHINE SULFATE 4 MG: 10 INJECTION INTRAVENOUS at 14:44

## 2018-11-01 RX ADMIN — MORPHINE SULFATE 4 MG: 10 INJECTION INTRAVENOUS at 04:46

## 2018-11-01 RX ADMIN — ATORVASTATIN CALCIUM 80 MG: 80 TABLET, FILM COATED ORAL at 22:00

## 2018-11-01 RX ADMIN — APIXABAN 5 MG: 5 TABLET, FILM COATED ORAL at 22:00

## 2018-11-01 RX ADMIN — HYDROCODONE BITARTRATE AND ACETAMINOPHEN 2 TABLET: 5; 325 TABLET ORAL at 22:00

## 2018-11-01 RX ADMIN — APIXABAN 5 MG: 5 TABLET, FILM COATED ORAL at 08:25

## 2018-11-01 RX ADMIN — SODIUM CHLORIDE 100 ML/HR: 9 INJECTION, SOLUTION INTRAVENOUS at 04:46

## 2018-11-01 RX ADMIN — ASPIRIN 81 MG: 81 TABLET, DELAYED RELEASE ORAL at 08:26

## 2018-11-01 RX ADMIN — CILOSTAZOL 100 MG: 100 TABLET ORAL at 22:00

## 2018-11-01 RX ADMIN — METOPROLOL TARTRATE 12.5 MG: 25 TABLET ORAL at 22:00

## 2018-11-01 RX ADMIN — CILOSTAZOL 100 MG: 100 TABLET ORAL at 08:26

## 2018-11-01 RX ADMIN — LISINOPRIL 5 MG: 5 TABLET ORAL at 08:26

## 2018-11-01 NOTE — PROGRESS NOTES
Discharge Planning Assessment  [facility]     Patient Name: Sylvain Amador  MRN: [id number]  Today's Date: 11/1/2018    Admit Date: 10/31/2018          Discharge Needs Assessment     Row Name 11/01/18 1053       Living Environment    Lives With spouse    Current Living Arrangements home/apartment/condo    Primary Care Provided by self    Provides Primary Care For no one    Family Caregiver if Needed spouse    Quality of Family Relationships involved;helpful    Able to Return to Prior Arrangements yes       Resource/Environmental Concerns    Resource/Environmental Concerns none    Transportation Concerns car, none       Transition Planning    Patient/Family Anticipates Transition to home with family    Patient/Family Anticipated Services at Transition none    Transportation Anticipated family or friend will provide       Discharge Needs Assessment    Concerns to be Addressed discharge planning    Equipment Currently Used at Home none    Anticipated Changes Related to Illness none    Equipment Needed After Discharge none    Offered/Gave Vendor List yes    Discharge Coordination/Progress Home with spouse, CCP to watch for new anticoagulation needs to check cost and discuss affordability. -GERRY Srivastava            Discharge Plan     Row Name 11/01/18 1058       Plan    Plan Home with spouse, CCP to watch for new anticoagulation needs to check cost and discuss affordability. -GERRY Srivastava    Patient/Family in Agreement with Plan yes    Plan Comments CCP met with patient and spouse at bedside. Explained role of CCP, verified face sheet, and discussed d/c planning. Patient lives with spouse in a multi-story home with 20 steps to enter. Denies issues with medication costs or transportation. Uses no DME. Independent with all ADL's. Discussed possibility of being put on new anticoagulation regimen upon d/c. Discussed d/c planning options, pt and spouse anticipate no needs.  CCP will follow to check cost  of anticoagulation medication and discuss patient affordability. -GERRY Srivastava        Destination     No service coordination in this encounter.      Durable Medical Equipment     No service coordination in this encounter.      Dialysis/Infusion     No service coordination in this encounter.      Home Medical Care     No service coordination in this encounter.      Social Care     No service coordination in this encounter.                Demographic Summary     Row Name 11/01/18 1052       General Information    Admission Type inpatient    Arrived From home    Referral Source physician;nursing    Reason for Consult discharge planning    Preferred Language English     Used During This Interaction no            Functional Status     Row Name 11/01/18 1052       Functional Status    Usual Activity Tolerance good    Current Activity Tolerance good       Functional Status, IADL    Medications independent    Meal Preparation independent    Housekeeping independent    Laundry independent    Shopping independent       Mental Status    General Appearance WDL WDL       Mental Status Summary    Recent Changes in Mental Status/Cognitive Functioning no changes            Psychosocial    No documentation.           Abuse/Neglect    No documentation.           Legal    No documentation.           Substance Abuse    No documentation.           Patient Forms    No documentation.         GERRY Srivastava

## 2018-11-01 NOTE — PLAN OF CARE
Problem: Patient Care Overview  Goal: Plan of Care Review  Outcome: Ongoing (interventions implemented as appropriate)   10/31/18 2104 11/01/18 0553   Plan of Care Review   Progress --  no change   OTHER   Outcome Summary --  Patient was admitted from ER. Pain and nausea PRN meds given. Patient is on IV fluids. Will continue to monitor vital signs, labs and comfort.   Coping/Psychosocial   Plan of Care Reviewed With patient --       10/31/18 2104 11/01/18 0553   Plan of Care Review   Progress --  no change   OTHER   Outcome Summary --  Patient was admitted from ER. Pain and nausea PRN meds given. Patient is on IV fluids. Will continue to monitor vital signs, labs and comfort.   Coping/Psychosocial   Plan of Care Reviewed With patient --

## 2018-11-01 NOTE — NURSING NOTE
Patient denied alcohol use but had alcohol smelling vomit upon arrival in unit. Patient denied tobacco use. He said he stopped smoking this morning.

## 2018-11-01 NOTE — PLAN OF CARE
Problem: Patient Care Overview  Goal: Plan of Care Review  Outcome: Ongoing (interventions implemented as appropriate)   11/01/18 1943   Plan of Care Review   Progress no change   OTHER   Outcome Summary Medicated PRN for flank/abdominal pain and discomfort. Patient will be NPO after midnight for procedure in the AM. Wife at bedside. Continue current plan of care.   Coping/Psychosocial   Plan of Care Reviewed With patient;spouse       Problem: Pain, Acute (Adult)  Goal: Identify Related Risk Factors and Signs and Symptoms  Outcome: Ongoing (interventions implemented as appropriate)    Goal: Acceptable Pain Control/Comfort Level  Outcome: Ongoing (interventions implemented as appropriate)      Problem: Nausea/Vomiting (Adult)  Goal: Symptom Relief  Outcome: Ongoing (interventions implemented as appropriate)    Goal: Adequate Hydration  Outcome: Ongoing (interventions implemented as appropriate)

## 2018-11-01 NOTE — PROGRESS NOTES
Name: Sylvain Amador ADMIT: 10/31/2018   : 1960  PCP: Justina Smith APRN    MRN: [id number] LOS: 1 days   AGE/SEX: 58 y.o. male  ROOM: [facility]     CC: Left flank pain  Interval History: Patient was started on a direct oral anticoagulation last night.  Pain is persistent but perhaps improved.  Still making urine.  Denies gross hematuria    Subjective   Review of Systems      Objective     Vital Signs  Temp:  [97.4 °F (36.3 °C)-98.5 °F (36.9 °C)] 98.5 °F (36.9 °C)  Heart Rate:  [70-83] 83  Resp:  [15-18] 16  BP: (106-163)/() 124/69    No intake/output data recorded.    Physical Exam  Vascular: Pedal pulses are chronically nonpalpable.  No evidence of distal emboli.  Radial pulses are symmetric.  Abdomen is soft and nontender    Data Reviewed:  CBC    Results from last 7 days  Lab Units 10/31/18  1245   WBC 10*3/mm3 12.57*   HEMOGLOBIN g/dL 15.0   PLATELETS 10*3/mm3 193     BMP   Results from last 7 days  Lab Units 10/31/18  1245   SODIUM mmol/L 136   POTASSIUM mmol/L 4.1   CHLORIDE mmol/L 100   CO2 mmol/L 21.8*   BUN mg/dL 20   CREATININE mg/dL 1.61*   GLUCOSE mg/dL 193*     Radiology(recent) Ct Angiogram Abdomen Pelvis With & Without Contrast    Result Date: 10/31/2018  Patent endograft in good position within a fusiform infrarenal abdominal aortic aneurysm measuring 4.4 cm in diameter that is stable since a preceding CTA dated 2018. Fusiform dilatation of both common iliac arteries appears to have increased slightly since the preceding CTA. There is a moderately large amount of thrombus in the right iliac limb of the endograft that has developed since the preceding CTA. This produces proximally 70% luminal narrowing. A thin peripheral rind of thrombus has also developed in the left iliac limb of the endograft. There is focal moderate to high-grade stenosis in the proximal left main renal artery that is unchanged. The left main renal artery is opacified, and appears patent. However,  there is complete lack of enhancement within the renal parenchyma on the left indicating renal infarction. A chronic infarct in the lower pole of the right kidney is unchanged. There is mild colonic diverticulosis without evidence of diverticulitis.  This report was finalized on 10/31/2018 4:56 PM by Dr. Jose L Gonzalez M.D.        Imaging Reviewed: CT angiogram and prior studies reviewed in detail.  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        Active Hospital Problems    Diagnosis Date Noted   • Abnormal radiologic findings on diagnostic imaging of left kidney [R93.422] 10/31/2018   • Renal infarct (CMS/HCC) [N28.0] 10/31/2018      Resolved Hospital Problems    Diagnosis Date Noted Date Resolved   No resolved problems to display.      Assessment/Plan   Billin, Subsequent Hospital Care    Impression/Plan:  Status post endovascular aortic aneurysm repair: This was done as an urgent intervention for symptomatic aneurysm.  The patient has significant amount of mural thrombus lining the pararenal segment of the aorta.  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  Endograft was used to repair the aneurysm and exclude the thrombus.  However, he presented with left flank pain that actually began Tuesday afternoon but worsened on Wednesday.  CT angiogram done on admission is quite unusual.  It does show mural thrombus in the proximal aortic neck with an associated left renal artery stenosis.  However, there is contrast the left renal artery all the way out to the kidney parenchyma.  However, the kidney parenchyma is completely devoid of any flow.  Hard to say if there is any flow in the left renal vein but it is not dilated.  The kidney is not enlarged or inflamed has one typically sees with an outflow stenosis with primary left renal vein occlusion.  I would presume that this is further embolic events from his mural  thrombus.  Because of the warm ischemia time of the kidney I don't think any sort of kidney intervention is warranted.  There are some reports of some delayed interventions with people with renal vein thrombosis and preserving kidney function but in that situation I would think the kidney would be engorged, swollen, with stranding in the soft tissue planes.  That's not present.  I'm not sure that it would be beneficial to intervene if the renal vein is thrombosed but it would help understand potential etiologies of his problems and help us better understand how to medically manage in the future.  I will order a renal artery ultrasound which will include renal veins for tomorrow.    For now the patient is anticoagulated.  I would perhaps consider using heparin but he was already started on a direct oral agent in the emergency room and so I have continued that.  I do think some of evaluation by hematology would be appropriate.  I will ask them to see him and see if there is any diagnostic testing that they would like to do.  I will also ask if nephrology can see him really to help manage his now solitary kidney in the future.    Arturo Huynh MD  11/01/18  8:37 AM  Office Number [phone]

## 2018-11-01 NOTE — CONSULTS
Patient Care Team:  Justina Smith APRN as PCP - General    Chief complaint:   Subjective     History of Present Illness     Mr. Amador is a very pleasant white male who presented overnight to the ER with chief complaint of abdominal pain. He described the pain as being abrupt in onset, on his left flank, stabbing, and unrelieved by nothing.  His serum cr was elevated at 1.6mg/dl. A CT angiogram of the abdomen and pelvis was obtained that revealed complete lack of enhancement within the renal parenchyma on the left indicating renal infarction.  There was a chronic infarct in the lower pole of the left kidney that is unchanged.  He denies any gross hematuria.  No history of clotting disorders. He underwent endovascular stent to AAA on 8/2/18. At that time his serum creatinine was 0.85mg/dl.  It was 1.1mg/dl on 8/4/18.  This am cr is 1.66mg/dl.  Patient's pain is somewhat controlled on iv morphine.  He denies any urinary complaints.  He denies history of kidney stones but does occasionally use NSAIDS. Former smoker. No family history of renal disease.    Review of Systems   · Constitutional: denies change in weight, change in appettite, malaise, fatigue, or fever  · Heent: denies difficulty hearing, vision changes, dysphagia, rhinorrhea, nasal congestion, or sore throat  · Pulmonary: denies dyspnea, cough, wheezing  · Cardiovascular: denies lower extremity edema, chest pain, PELAYO, heart palpitations, orthopnea, or PND  · Gastrointestinal: c/o abdominal pain, denies nausea, vomiting, melena, hematochezia, constipation, or diarrhea  · Genitourinary: denies dysura, hematuria, urinary urgency, urinary frequency, or noctura  · Musculoskeletal: denies arthralgias, denies myalgias  · Neuro: denies headache, LOC, dizziness, tremor  · Endocrine: denies cold intolerance, denies heat intolerance  · Heme: denies bruising, denies bleeding, denies history of blood clots  · Psych: denies anxiety, denies depression,  denies suicidal ideation  · Skin: denies wounds, lesions, or rashes      Past Medical History:   Diagnosis Date   • Atherosclerosis of native coronary artery of native heart without angina pectoris    • CAD (coronary artery disease)    • Chest pain    • Coronary atherosclerosis of native coronary vessel    • Hyperlipidemia    • Hypertension    • Intermittent claudication (CMS/HCC)    • PAD (peripheral artery disease) (CMS/HCC)     of lower extremities   ,   Past Surgical History:   Procedure Laterality Date   • ARTERIOGRAM AORTIC Left 8/2/2018    Procedure: AORTIC AND LEFT ILIAC ARTERY ANEURYSM REPAIR;  Surgeon: Arturo Huynh MD;  Location: [facility];  Service: Vascular   • CARDIAC CATHETERIZATION  11/2015   • CORONARY ANGIOPLASTY WITH STENT PLACEMENT  2015   • RHINOPLASTY  1975   • VASECTOMY     ,   Family History   Problem Relation Age of Onset   • Diabetes Mother    • Hypertension Mother    • Pancreatic cancer Mother    • Cancer Father    • Cancer Brother    ,   Social History   Substance Use Topics   • Smoking status: Current Every Day Smoker     Packs/day: 0.50   • Smokeless tobacco: Current User      Comment: caffeine use/ patient stopped smoking today 10/31/18   • Alcohol use No      Comment: Denies but patient had alcohol smelling vomit upon arrival on unit    ,   Prescriptions Prior to Admission   Medication Sig Dispense Refill Last Dose   • aspirin 81 MG EC tablet Take 81 mg by mouth 2 (Two) Times a Day.   8/1/2018 at Unknown time   • atorvastatin (LIPITOR) 80 MG tablet TAKE 1 TABLET BY MOUTH EVERY NIGHT. 30 tablet 0 8/1/2018 at Unknown time   • cilostazol (PLETAL) 100 MG tablet Take 1 tablet by mouth 2 (Two) Times a Day.  3 8/1/2018 at Unknown time   • HYDROcodone-acetaminophen (NORCO) 5-325 MG per tablet Take 2 tablets by mouth Every 6 (Six) Hours As Needed for Moderate Pain . 24 tablet 0    • lisinopril (PRINIVIL,ZESTRIL) 5 MG tablet TAKE 1 TABLET BY MOUTH DAILY. 90 tablet 0 8/1/2018 at  Unknown time   • lisinopril (PRINIVIL,ZESTRIL) 5 MG tablet TAKE 1 TABLET BY MOUTH DAILY. 90 tablet 1 8/1/2018 at Unknown time   • metoprolol tartrate (LOPRESSOR) 25 MG tablet TAKE 1/2 TABLET BY MOUTH TWICE DAILY. 90 tablet 3 8/1/2018 at Unknown time   , Scheduled Meds:    apixaban 5 mg Oral Q12H   aspirin 81 mg Oral Daily   atorvastatin 80 mg Oral Nightly   cilostazol 100 mg Oral BID   insulin aspart 0-7 Units Subcutaneous 4x Daily With Meals & Nightly   lisinopril 5 mg Oral Daily   metoprolol tartrate 12.5 mg Oral BID   , PRN Meds:  dextrose  •  dextrose  •  glucagon (human recombinant)  •  HYDROcodone-acetaminophen  •  Morphine **AND** naloxone  •  ondansetron **OR** ondansetron ODT **OR** ondansetron and Allergies:  No known drug allergy    Objective     Vital Signs  Temp:  [97.4 °F (36.3 °C)-98.5 °F (36.9 °C)] 98 °F (36.7 °C)  Heart Rate:  [71-83] 78  Resp:  [15-18] 18  BP: (122-155)/(69-89) 128/70    I/O this shift:  In: 2520 [P.O.:2520]  Out: 1150 [Urine:1150]  I/O last 3 completed shifts:  In: 2111 [P.O.:360; I.V.:1751]  Out: -     Physical Exam  GEN:NAD, well nourished  HEENT:NCAT, PERRL, EOMI, OP clear, MMM  NECK:supple, no JVD, no carotid bruits  CVA:RRR s1, s2 no m/r/g  CHEST:CTA B no wheezes or rhonchi  ABD:soft, nontender, nondistended +bs  EXT:no c/c/e 2+PP B  NEURO:CN II-XII grossly nonfocal, no evidence of asterixes or tremor  PSYCH: appropriate mood and affect  :deferred  SKIN: warm, dry, intact, no lesions or rashes  Results Review:    I reviewed the patient's new clinical results.    WBC WBC   Date Value Ref Range Status   10/31/2018 12.57 (H) 4.50 - 10.70 10*3/mm3 Final      HGB Hemoglobin   Date Value Ref Range Status   10/31/2018 15.0 13.7 - 17.6 g/dL Final      HCT Hematocrit   Date Value Ref Range Status   10/31/2018 46.8 40.4 - 52.2 % Final      Platlets No results found for: LABPLAT   MCV MCV   Date Value Ref Range Status   10/31/2018 90.3 79.8 - 96.2 fL Final          Sodium Sodium    Date Value Ref Range Status   11/01/2018 130 (L) 136 - 145 mmol/L Final   10/31/2018 136 136 - 145 mmol/L Final      Potassium Potassium   Date Value Ref Range Status   11/01/2018 4.6 3.5 - 5.2 mmol/L Final   10/31/2018 4.1 3.5 - 5.2 mmol/L Final      Chloride Chloride   Date Value Ref Range Status   11/01/2018 98 98 - 107 mmol/L Final   10/31/2018 100 98 - 107 mmol/L Final      CO2 CO2   Date Value Ref Range Status   11/01/2018 21.5 (L) 22.0 - 29.0 mmol/L Final   10/31/2018 21.8 (L) 22.0 - 29.0 mmol/L Final      BUN BUN   Date Value Ref Range Status   11/01/2018 22 8 - 23 mg/dL Final   10/31/2018 20 8 - 23 mg/dL Final      Creatinine Creatinine   Date Value Ref Range Status   11/01/2018 1.66 (H) 0.76 - 1.27 mg/dL Final   10/31/2018 1.61 (H) 0.76 - 1.27 mg/dL Final      Calcium Calcium   Date Value Ref Range Status   11/01/2018 8.6 8.6 - 10.5 mg/dL Final   10/31/2018 9.9 8.6 - 10.5 mg/dL Final      PO4 No results found for: CAPO4   Albumin Albumin   Date Value Ref Range Status   10/31/2018 4.60 3.50 - 5.20 g/dL Final      Magnesium No results found for: MG   Uric Acid No results found for: URICACID         Assessment/Plan       Abnormal radiologic findings on diagnostic imaging of left kidney    Renal infarct (CMS/HCC)      Assessment & Plan     Nonoliguric CARLOS--secondary to left renal infarct. Serum cr 1.66mg/dl. Was 0.9mg/dl at baseline prior to endovascular repair of AAA on 8/2/18.  Some of this may be subacute given cr of 1.1mg/dl on 8/4/18. UA reviewed and only 0-2rbc/hpf. No report of gross hematuria. Monitor renal function daily for possible HORACIO .  Continue IVF. Dose medication for crcl<40ml/min.  Left renal infarct-per CTA abdomen and pelvis on 10/31/18. Now on eliquis 5mg po bid.Agree w/ hematology consult for hypercoaguable work up.  Hyponatremia-will check urine Na. BMP in am. Volume status slightly generous. Will reeval in am.   AAA--s/p endovascular repair 8/2/18.   HTN--will hold ACEI and monitor.  Continue lopressor 12.5mg p bid.  Will titrate as needed.   PAD/PVD-severe. Per vascular. On pletal, lipitor, ASA.  CAD      I discussed the patients findings and my recommendations with patient and family     Thank you for allowing us to participate in Mr. Amador's care.  We will follow along with you.    Elizabeth Alcantara MD  11/01/18  6:14 PM    Time: More than 50% of time spent in counseling and coordination of care:  Total face-to-face/floor time 50 min.  Time spent in counseling 20 min. Counseling included the following topics: diagnosis and treatment plan

## 2018-11-02 ENCOUNTER — APPOINTMENT (OUTPATIENT)
Dept: GENERAL RADIOLOGY | Facility: HOSPITAL | Age: 58
End: 2018-11-02

## 2018-11-02 ENCOUNTER — APPOINTMENT (OUTPATIENT)
Dept: CARDIOLOGY | Facility: HOSPITAL | Age: 58
End: 2018-11-02
Attending: SURGERY

## 2018-11-02 LAB
ANION GAP SERPL CALCULATED.3IONS-SCNC: 8.1 MMOL/L
BASOPHILS # BLD AUTO: 0.03 10*3/MM3 (ref 0–0.2)
BASOPHILS NFR BLD AUTO: 0.2 % (ref 0–1.5)
BH CV ECHO MEAS - DIST REN A PSV LEFT: 43.8 CM/S
BH CV ECHO MEAS - HILAR A PSV LEFT: 24.8 CM/SEC
BH CV ECHO MEAS - MID REN A PSV LEFT: 39.3 CM/SEC
BH CV ECHO MEAS - PROX REN A PSV LEFT: 48.4 CM/S
BH CV VAS BP LEFT ARM: NORMAL MMHG
BH CV VAS BP RIGHT ARM: NORMAL MMHG
BH CV VAS RENAL AORTIC MID PSV: 65 CM/S
BH CV VAS RENAL HILUM LEFT PSV: 37.4 CM/S
BH CV VAS RENAL HILUM RIGHT EDV: 10.5 CM/S
BH CV VAS RENAL HILUM RIGHT PSV: 51.2 CM/S
BH CV XLRA MEAS - KID L LEFT: 11.7 CM
BH CV XLRA MEAS DIST REN A EDV RIGHT: 20.5 CM/SEC
BH CV XLRA MEAS DIST REN A PSV RIGHT: 103 CM/SEC
BH CV XLRA MEAS DIST REN A RI RIGHT: 0.8
BH CV XLRA MEAS HILAR A EDV RIGHT: 10.5 CM/SEC
BH CV XLRA MEAS HILAR A PSV RIGHT: 51.2 CM/SEC
BH CV XLRA MEAS HILAR A RI RIGHT: 0.79
BH CV XLRA MEAS KID L RIGHT: 11.8 CM
BH CV XLRA MEAS KID W RIGHT: 6.64 CM
BH CV XLRA MEAS MID REN A EDV RIGHT: 20.1 CM/SEC
BH CV XLRA MEAS MID REN A PSV RIGHT: 84.9 CM/SEC
BH CV XLRA MEAS MID REN A RI RIGHT: 0.76
BH CV XLRA MEAS PROX REN A EDV RIGHT: 28.8 CM/SEC
BH CV XLRA MEAS PROX REN A PSV RIGHT: 123 CM/SEC
BH CV XLRA MEAS PROX REN A RI RIGHT: 0.77
BH CV XLRA MEAS RAR LEFT: 0.74
BH CV XLRA MEAS RAR RIGHT: 1.89
BH CV XLRA MEAS RENAL A ORG EDV RIGHT: 34.9 CM/SEC
BH CV XLRA MEAS RENAL A ORG PSV LEFT: 44 CM/SEC
BH CV XLRA MEAS RENAL A ORG PSV RIGHT: 121 CM/SEC
BH CV XLRA MEAS RENAL A ORG RI RIGHT: 0.71
BUN BLD-MCNC: 19 MG/DL (ref 8–23)
BUN/CREAT SERPL: 11.2 (ref 7–25)
CALCIUM SPEC-SCNC: 8.7 MG/DL (ref 8.6–10.5)
CHLORIDE SERPL-SCNC: 99 MMOL/L (ref 98–107)
CO2 SERPL-SCNC: 23.9 MMOL/L (ref 22–29)
CREAT BLD-MCNC: 1.69 MG/DL (ref 0.76–1.27)
CREAT UR-MCNC: 107.7 MG/DL
DEPRECATED RDW RBC AUTO: 49.1 FL (ref 37–54)
EOSINOPHIL # BLD AUTO: 0.08 10*3/MM3 (ref 0–0.7)
EOSINOPHIL NFR BLD AUTO: 0.6 % (ref 0.3–6.2)
ERYTHROCYTE [DISTWIDTH] IN BLOOD BY AUTOMATED COUNT: 14.6 % (ref 11.5–14.5)
F5 GENE MUT ANL BLD/T: NORMAL
FACTOR II, DNA ANALYSIS: NORMAL
GFR SERPL CREATININE-BSD FRML MDRD: 42 ML/MIN/1.73
GLUCOSE BLD-MCNC: 132 MG/DL (ref 65–99)
GLUCOSE BLDC GLUCOMTR-MCNC: 133 MG/DL (ref 70–130)
GLUCOSE BLDC GLUCOMTR-MCNC: 134 MG/DL (ref 70–130)
GLUCOSE BLDC GLUCOMTR-MCNC: 136 MG/DL (ref 70–130)
GLUCOSE BLDC GLUCOMTR-MCNC: 138 MG/DL (ref 70–130)
HBA1C MFR BLD: 7 % (ref 4.8–5.6)
HCT VFR BLD AUTO: 37.6 % (ref 40.4–52.2)
HGB BLD-MCNC: 12.2 G/DL (ref 13.7–17.6)
IMM GRANULOCYTES # BLD: 0.02 10*3/MM3 (ref 0–0.03)
IMM GRANULOCYTES NFR BLD: 0.1 % (ref 0–0.5)
LDH SERPL-CCNC: 948 U/L (ref 135–225)
LEFT KIDNEY WIDTH: 6.35 CM
LEFT RENAL UPPER PARENCHYMA MAX: 45 CM/S
LEFT RENAL UPPER PARENCHYMA MIN: 13.8 CM/S
LEFT RENAL UPPER PARENCHYMA RI: 0.69
LYMPHOCYTES # BLD AUTO: 1.52 10*3/MM3 (ref 0.9–4.8)
LYMPHOCYTES NFR BLD AUTO: 11.4 % (ref 19.6–45.3)
MCH RBC QN AUTO: 29.5 PG (ref 27–32.7)
MCHC RBC AUTO-ENTMCNC: 32.4 G/DL (ref 32.6–36.4)
MCV RBC AUTO: 91 FL (ref 79.8–96.2)
MONOCYTES # BLD AUTO: 1.67 10*3/MM3 (ref 0.2–1.2)
MONOCYTES NFR BLD AUTO: 12.5 % (ref 5–12)
NEUTROPHILS # BLD AUTO: 10.09 10*3/MM3 (ref 1.9–8.1)
NEUTROPHILS NFR BLD AUTO: 75.3 % (ref 42.7–76)
PLATELET # BLD AUTO: 139 10*3/MM3 (ref 140–500)
PMV BLD AUTO: 10.7 FL (ref 6–12)
POTASSIUM BLD-SCNC: 4.4 MMOL/L (ref 3.5–5.2)
PROT UR-MCNC: 92 MG/DL
RBC # BLD AUTO: 4.13 10*6/MM3 (ref 4.6–6)
RIGHT RENAL UPPER PARENCHYMA MAX: 38.5 CM/S
RIGHT RENAL UPPER PARENCHYMA MIN: 10.5 CM/S
RIGHT RENAL UPPER PARENCHYMA RI: 0.73
SODIUM BLD-SCNC: 131 MMOL/L (ref 136–145)
SODIUM UR-SCNC: 23 MMOL/L
WBC NRBC COR # BLD: 13.39 10*3/MM3 (ref 4.5–10.7)

## 2018-11-02 PROCEDURE — 99254 IP/OBS CNSLTJ NEW/EST MOD 60: CPT | Performed by: INTERNAL MEDICINE

## 2018-11-02 PROCEDURE — 85303 CLOT INHIBIT PROT C ACTIVITY: CPT | Performed by: INTERNAL MEDICINE

## 2018-11-02 PROCEDURE — 85300 ANTITHROMBIN III ACTIVITY: CPT | Performed by: INTERNAL MEDICINE

## 2018-11-02 PROCEDURE — 25010000002 MORPHINE PER 10 MG: Performed by: SURGERY

## 2018-11-02 PROCEDURE — 81240 F2 GENE: CPT | Performed by: INTERNAL MEDICINE

## 2018-11-02 PROCEDURE — 93975 VASCULAR STUDY: CPT

## 2018-11-02 PROCEDURE — 82962 GLUCOSE BLOOD TEST: CPT

## 2018-11-02 PROCEDURE — 85705 THROMBOPLASTIN INHIBITION: CPT | Performed by: INTERNAL MEDICINE

## 2018-11-02 PROCEDURE — 86147 CARDIOLIPIN ANTIBODY EA IG: CPT | Performed by: INTERNAL MEDICINE

## 2018-11-02 PROCEDURE — 83615 LACTATE (LD) (LDH) ENZYME: CPT | Performed by: INTERNAL MEDICINE

## 2018-11-02 PROCEDURE — 80048 BASIC METABOLIC PNL TOTAL CA: CPT | Performed by: SURGERY

## 2018-11-02 PROCEDURE — 83036 HEMOGLOBIN GLYCOSYLATED A1C: CPT | Performed by: SURGERY

## 2018-11-02 PROCEDURE — 86146 BETA-2 GLYCOPROTEIN ANTIBODY: CPT | Performed by: INTERNAL MEDICINE

## 2018-11-02 PROCEDURE — 85306 CLOT INHIBIT PROT S FREE: CPT | Performed by: INTERNAL MEDICINE

## 2018-11-02 PROCEDURE — 71046 X-RAY EXAM CHEST 2 VIEWS: CPT

## 2018-11-02 PROCEDURE — 85732 THROMBOPLASTIN TIME PARTIAL: CPT | Performed by: INTERNAL MEDICINE

## 2018-11-02 PROCEDURE — 81241 F5 GENE: CPT | Performed by: INTERNAL MEDICINE

## 2018-11-02 PROCEDURE — 85670 THROMBIN TIME PLASMA: CPT | Performed by: INTERNAL MEDICINE

## 2018-11-02 PROCEDURE — 85613 RUSSELL VIPER VENOM DILUTED: CPT | Performed by: INTERNAL MEDICINE

## 2018-11-02 PROCEDURE — 85025 COMPLETE CBC W/AUTO DIFF WBC: CPT | Performed by: SURGERY

## 2018-11-02 RX ORDER — SODIUM CHLORIDE 9 MG/ML
75 INJECTION, SOLUTION INTRAVENOUS CONTINUOUS
Status: DISCONTINUED | OUTPATIENT
Start: 2018-11-02 | End: 2018-11-03

## 2018-11-02 RX ORDER — HYDROCODONE BITARTRATE AND ACETAMINOPHEN 5; 325 MG/1; MG/1
1 TABLET ORAL EVERY 6 HOURS PRN
Status: DISCONTINUED | OUTPATIENT
Start: 2018-11-02 | End: 2018-11-03 | Stop reason: HOSPADM

## 2018-11-02 RX ORDER — HYDROCODONE BITARTRATE AND ACETAMINOPHEN 5; 325 MG/1; MG/1
2 TABLET ORAL EVERY 6 HOURS PRN
Status: DISCONTINUED | OUTPATIENT
Start: 2018-11-02 | End: 2018-11-03 | Stop reason: HOSPADM

## 2018-11-02 RX ORDER — BISACODYL 10 MG
10 SUPPOSITORY, RECTAL RECTAL ONCE
Status: COMPLETED | OUTPATIENT
Start: 2018-11-02 | End: 2018-11-03

## 2018-11-02 RX ORDER — BISACODYL 10 MG
10 SUPPOSITORY, RECTAL RECTAL ONCE
Status: DISCONTINUED | OUTPATIENT
Start: 2018-11-02 | End: 2018-11-03 | Stop reason: HOSPADM

## 2018-11-02 RX ORDER — SENNA AND DOCUSATE SODIUM 50; 8.6 MG/1; MG/1
2 TABLET, FILM COATED ORAL NIGHTLY
Status: DISCONTINUED | OUTPATIENT
Start: 2018-11-02 | End: 2018-11-03 | Stop reason: HOSPADM

## 2018-11-02 RX ADMIN — METOPROLOL TARTRATE 12.5 MG: 25 TABLET ORAL at 17:17

## 2018-11-02 RX ADMIN — DOCUSATE SODIUM -SENNOSIDES 2 TABLET: 50; 8.6 TABLET, COATED ORAL at 21:23

## 2018-11-02 RX ADMIN — ASPIRIN 81 MG: 81 TABLET, DELAYED RELEASE ORAL at 12:48

## 2018-11-02 RX ADMIN — HYDROCODONE BITARTRATE AND ACETAMINOPHEN 2 TABLET: 5; 325 TABLET ORAL at 19:16

## 2018-11-02 RX ADMIN — MORPHINE SULFATE 4 MG: 10 INJECTION INTRAVENOUS at 03:49

## 2018-11-02 RX ADMIN — CILOSTAZOL 100 MG: 100 TABLET ORAL at 21:23

## 2018-11-02 RX ADMIN — METOPROLOL TARTRATE 12.5 MG: 25 TABLET ORAL at 21:23

## 2018-11-02 RX ADMIN — ATORVASTATIN CALCIUM 80 MG: 80 TABLET, FILM COATED ORAL at 21:23

## 2018-11-02 RX ADMIN — APIXABAN 5 MG: 5 TABLET, FILM COATED ORAL at 21:23

## 2018-11-02 RX ADMIN — APIXABAN 5 MG: 5 TABLET, FILM COATED ORAL at 12:48

## 2018-11-02 RX ADMIN — SODIUM CHLORIDE 75 ML/HR: 9 INJECTION, SOLUTION INTRAVENOUS at 15:34

## 2018-11-02 RX ADMIN — HYDROCODONE BITARTRATE AND ACETAMINOPHEN 1 TABLET: 5; 325 TABLET ORAL at 12:47

## 2018-11-02 RX ADMIN — CILOSTAZOL 100 MG: 100 TABLET ORAL at 12:47

## 2018-11-02 NOTE — PROGRESS NOTES
" LOS: 2 days   Patient Care Team:  Justina Smith APRN as PCP - General    Chief Complaint:     Subjective     History of Present Illness    Subjective  Doing well this afternoon.  Pain controlled. Denies soa, n/v. No urinary complaints.      History taken from: patient chart    Objective     Vital Sign Min/Max for last 24 hours  Temp  Min: 97.1 °F (36.2 °C)  Max: 100.7 °F (38.2 °C)   BP  Min: 116/70  Max: 138/59   Pulse  Min: 82  Max: 94   Resp  Min: 16  Max: 18   SpO2  Min: 83 %  Max: 96 %   Flow (L/min)  Min: 2  Max: 5   No Data Recorded     Flowsheet Rows      First Filed Value   Admission Height  182.9 cm (72\") Documented at 10/31/2018 1140   Admission Weight  94.3 kg (208 lb) Documented at 10/31/2018 1221          I/O this shift:  In: 480 [P.O.:480]  Out: -   I/O last 3 completed shifts:  In: 4352 [P.O.:3600; I.V.:752]  Out: 1850 [Urine:1850]    Objective  GEN:NAD, well nourished  HEENT:NCAT, PERRL, EOMI, OP clear, MMM  NECK:supple, no JVD, no carotid bruits  CVA:RRR s1, s2 no m/r/g  CHEST:CTA B no wheezes or rhonchi  ABD:soft, nontender, nondistended +bs  EXT:no c/c/e 2+PP B  NEURO:CN II-XII grossly nonfocal, no evidence of asterixes or tremor  PSYCH: appropriate mood and affect  :deferred  SKIN: warm, dry, intact, no lesions or rashes  Results Review:     I reviewed the patient's new clinical results.    WBC WBC   Date Value Ref Range Status   11/02/2018 13.39 (H) 4.50 - 10.70 10*3/mm3 Final   10/31/2018 12.57 (H) 4.50 - 10.70 10*3/mm3 Final      HGB Hemoglobin   Date Value Ref Range Status   11/02/2018 12.2 (L) 13.7 - 17.6 g/dL Final   10/31/2018 15.0 13.7 - 17.6 g/dL Final      HCT Hematocrit   Date Value Ref Range Status   11/02/2018 37.6 (L) 40.4 - 52.2 % Final   10/31/2018 46.8 40.4 - 52.2 % Final      Platlets No results found for: LABPLAT   MCV MCV   Date Value Ref Range Status   11/02/2018 91.0 79.8 - 96.2 fL Final   10/31/2018 90.3 79.8 - 96.2 fL Final          Sodium Sodium   Date Value " Ref Range Status   11/02/2018 131 (L) 136 - 145 mmol/L Final   11/01/2018 130 (L) 136 - 145 mmol/L Final   10/31/2018 136 136 - 145 mmol/L Final      Potassium Potassium   Date Value Ref Range Status   11/02/2018 4.4 3.5 - 5.2 mmol/L Final   11/01/2018 4.6 3.5 - 5.2 mmol/L Final   10/31/2018 4.1 3.5 - 5.2 mmol/L Final      Chloride Chloride   Date Value Ref Range Status   11/02/2018 99 98 - 107 mmol/L Final   11/01/2018 98 98 - 107 mmol/L Final   10/31/2018 100 98 - 107 mmol/L Final      CO2 CO2   Date Value Ref Range Status   11/02/2018 23.9 22.0 - 29.0 mmol/L Final   11/01/2018 21.5 (L) 22.0 - 29.0 mmol/L Final   10/31/2018 21.8 (L) 22.0 - 29.0 mmol/L Final      BUN BUN   Date Value Ref Range Status   11/02/2018 19 8 - 23 mg/dL Final   11/01/2018 22 8 - 23 mg/dL Final   10/31/2018 20 8 - 23 mg/dL Final      Creatinine Creatinine   Date Value Ref Range Status   11/02/2018 1.69 (H) 0.76 - 1.27 mg/dL Final   11/01/2018 1.66 (H) 0.76 - 1.27 mg/dL Final   10/31/2018 1.61 (H) 0.76 - 1.27 mg/dL Final      Calcium Calcium   Date Value Ref Range Status   11/02/2018 8.7 8.6 - 10.5 mg/dL Final   11/01/2018 8.6 8.6 - 10.5 mg/dL Final   10/31/2018 9.9 8.6 - 10.5 mg/dL Final      PO4 No results found for: CAPO4   Albumin Albumin   Date Value Ref Range Status   10/31/2018 4.60 3.50 - 5.20 g/dL Final      Magnesium No results found for: MG   Uric Acid No results found for: URICACID     Medication Review:       apixaban 5 mg Oral Q12H   aspirin 81 mg Oral Daily   atorvastatin 80 mg Oral Nightly   bisacodyl 10 mg Rectal Once   bisacodyl 10 mg Rectal Once   cilostazol 100 mg Oral BID   insulin aspart 0-7 Units Subcutaneous 4x Daily With Meals & Nightly   metoprolol tartrate 12.5 mg Oral BID   sennosides-docusate sodium 2 tablet Oral Nightly       Assessment/Plan       Abnormal radiologic findings on diagnostic imaging of left kidney    Renal infarct (CMS/HCC)      Assessment & Plan  Nonoliguric CARLOS--secondary to left renal infarct.  Serum cr trending up slightly after CTA abdomen 10/31. Serum cr 0.9mg/dl at baseline prior to endovascular repair of AAA on 8/2/18.  Some of this may be subacute given cr of 1.1mg/dl on 8/4/18. UA reviewed and only 0-2rbc/hpf. ontinue IVF. Dose medication for crcl<40ml/min.  Left renal infarct-per CTA abdomen and pelvis on 10/31/18. Now on eliquis 5mg po bid.Agree w/ hematology consult for hypercoaguable work up.  Hyponatremia-Myah 23. Improving with IVF. Continue NS @75cc/hr.   AAA--s/p endovascular repair 8/2/18.   HTN-- bp acceptable. ACEI held. Continue lopressor 12.5mg p bid.  Will titrate as needed.   PAD/PVD-severe. Per vascular. On pletal, lipitor, ASA.  CAD        Elizabeth Alcantara MD  11/02/18  5:54 PM

## 2018-11-02 NOTE — PROGRESS NOTES
Name: Sylvain Amador ADMIT: 10/31/2018   : 1960  PCP: Justina Smith APRN    MRN: [id number] LOS: 2 days   AGE/SEX: 58 y.o. male  ROOM: [facility]   [place], Subsequent Hospital Care    Chief Complaint   Patient presents with   • Abdominal Pain     CC: Flank pain follow-up.  Subjective     58 y.o. male patient's left flank pain associated with ischemic kidney of moderate severity is slowly improving.      Denies chest pain shortness of breath.  Patient is constipated.    Review of Systems    Objective     Scheduled Medications:     apixaban 5 mg Oral Q12H   aspirin 81 mg Oral Daily   atorvastatin 80 mg Oral Nightly   bisacodyl 10 mg Rectal Once   bisacodyl 10 mg Rectal Once   cilostazol 100 mg Oral BID   insulin aspart 0-7 Units Subcutaneous 4x Daily With Meals & Nightly   metoprolol tartrate 12.5 mg Oral BID       Active Infusions:    sodium chloride 75 mL/hr       As Needed Medications:  dextrose  •  dextrose  •  glucagon (human recombinant)  •  HYDROcodone-acetaminophen **OR** HYDROcodone-acetaminophen  •  Morphine **AND** naloxone  •  ondansetron **OR** ondansetron ODT **OR** ondansetron    Vital Signs  Vital Signs Patient Vitals for the past 24 hrs:   BP Temp Temp src Pulse Resp SpO2   18 1252 - - - 91 - 93 %   18 1246 - - - - - 93 %   18 1245 - - - - - (!) 87 %   18 1213 - - - - - 91 %   18 1210 135/74 97.1 °F (36.2 °C) Oral 92 18 -   18 0904 - - - 94 - 92 %   18 0858 - - - - - 91 %   18 0850 - - - - - (!) 85 %   18 0837 - - - 92 - 91 %   18 0831 - - - - - 92 %   18 0823 132/65 99 °F (37.2 °C) Oral 94 16 (!) 83 %   18 0710 - - - - - 90 %   18 0708 118/59 98.9 °F (37.2 °C) Oral 89 16 (!) 86 %   18 0342 116/70 (!) 100.7 °F (38.2 °C) Oral 86 16 90 %   18 215 132/72 99.8 °F (37.7 °C) Oral 93 - 92 %     I/O:  I/O last 3 completed shifts:  In: 4352 [P.O.:3600; I.V.:752]  Out:  [Urine:1850]    Physical  Exam:  Physical Exam   Constitutional: He is oriented to person, place, and time. He appears well-developed and well-nourished.   HENT:   Head: Normocephalic and atraumatic.   Eyes: Pupils are equal, round, and reactive to light. EOM are normal.   Neck: Normal range of motion. Neck supple.   Cardiovascular: Normal rate and regular rhythm.    Pulmonary/Chest: Effort normal and breath sounds normal.   Abdominal: Soft. Bowel sounds are normal.   Musculoskeletal: Normal range of motion. He exhibits no edema.   Neurological: He is alert and oriented to person, place, and time.   Skin: Skin is warm and dry. Capillary refill takes less than 2 seconds.   Psychiatric: He has a normal mood and affect. His behavior is normal.   Vitals reviewed.      Results Review:     CBC    Results from last 7 days  Lab Units 11/02/18  0611 10/31/18  1245   WBC 10*3/mm3 13.39* 12.57*   HEMOGLOBIN g/dL 12.2* 15.0   PLATELETS 10*3/mm3 139* 193     BMP   Results from last 7 days  Lab Units 11/02/18  0611 11/01/18  0855 10/31/18  1245   SODIUM mmol/L 131* 130* 136   POTASSIUM mmol/L 4.4 4.6 4.1   CHLORIDE mmol/L 99 98 100   CO2 mmol/L 23.9 21.5* 21.8*   BUN mg/dL 19 22 20   CREATININE mg/dL 1.69* 1.66* 1.61*   GLUCOSE mg/dL 132* 162* 193*     Cr Clearance Estimated Creatinine Clearance: 58.9 mL/min (A) (by C-G formula based on SCr of 1.69 mg/dL (H)).  Coag   Results from last 7 days  Lab Units 10/31/18  1719   INR  1.01   APTT seconds 28.7     HbA1C   Lab Results   Component Value Date    HGBA1C 7.00 (H) 11/02/2018     Blood Glucose   Glucose   Date/Time Value Ref Range Status   11/02/2018 1158 134 (H) 70 - 130 mg/dL Final   11/02/2018 0704 136 (H) 70 - 130 mg/dL Final   11/01/2018 2148 140 (H) 70 - 130 mg/dL Final     Infection     CMP   Results from last 7 days  Lab Units 11/02/18  0611 11/01/18  0855 10/31/18  1245   SODIUM mmol/L 131* 130* 136   POTASSIUM mmol/L 4.4 4.6 4.1   CHLORIDE mmol/L 99 98 100   CO2 mmol/L 23.9 21.5* 21.8*   BUN  mg/dL 19 22 20   CREATININE mg/dL 1.69* 1.66* 1.61*   GLUCOSE mg/dL 132* 162* 193*   ALBUMIN g/dL  --   --  4.60   BILIRUBIN mg/dL  --   --  0.4   ALK PHOS U/L  --   --  73   AST (SGOT) U/L  --   --  15   ALT (SGPT) U/L  --   --  14   LIPASE U/L  --   --  26     ABG        Assessment/Plan     Assessment & Plan      Abnormal radiologic findings on diagnostic imaging of left kidney    Renal infarct (CMS/HCC)      58 y.o. male patient with apparent micro-thrombosis of left kidney parenchyma.  Left renal artery on CT scan appears widely patent out into its distal tertiary branches.  Renal vein patent on duplex ultrasound.  There is a moderate, likely not flow limiting stenosis present in the proximal left renal artery.  Unclear to the exact etiology of the renal infarction.  But I feel continuing with therapeutic anticoagulation is likely patient's best long-term solution.  Appreciate nephrology's assistance.  Hematology to see for potential hypercoagulable workup.    Shan Sandra MD  11/02/18  2:53 PM    Please call my office with any question: [phone]    Active Hospital Problems    Diagnosis Date Noted   • Abnormal radiologic findings on diagnostic imaging of left kidney [R93.422] 10/31/2018   • Renal infarct (CMS/HCC) [N28.0] 10/31/2018      Resolved Hospital Problems    Diagnosis Date Noted Date Resolved   No resolved problems to display.

## 2018-11-02 NOTE — CONSULTS
Subjective     REASON FOR CONSULTATION:  Left renal infarction  Provide an opinion on any further workup or treatment                             REQUESTING PHYSICIAN:  Arturo Huynh M.D.    RECORDS OBTAINED:  Records of the patients history including those obtained from the referring provider were reviewed and summarized in detail.    HISTORY OF PRESENT ILLNESS:  The patient is a 58 y.o. year old male who is here for an opinion about the above issue.  He has a history of vascular disease and underwent a recent abdominal aneurysm stent graft repair in August.  He was on antiplatelet therapy with aspirin and Pletal prior to this admission.    He was admitted on 10/31/2018 through the emergency room with left flank pain and a CT scan that showed probable left renal infarct.  He has since undergone a renal Doppler that again showed partial occlusion of the distal renal artery.    He now is on anticoagulation with Eliquis 5 mg by mouth twice a day he is requiring narcotic pain medicine to control his flank pain.    We were asked to see the patient to perform a hypercoagulable laboratory evaluation.    History of Present Illness     Past Medical History:   Diagnosis Date   • Atherosclerosis of native coronary artery of native heart without angina pectoris    • CAD (coronary artery disease)    • Chest pain    • Coronary atherosclerosis of native coronary vessel    • Hyperlipidemia    • Hypertension    • Intermittent claudication (CMS/HCC)    • PAD (peripheral artery disease) (CMS/HCC)     of lower extremities        Past Surgical History:   Procedure Laterality Date   • ARTERIOGRAM AORTIC Left 8/2/2018    Procedure: AORTIC AND LEFT ILIAC ARTERY ANEURYSM REPAIR;  Surgeon: Arturo Huynh MD;  Location: [facility];  Service: Vascular   • CARDIAC CATHETERIZATION  11/2015   • CORONARY ANGIOPLASTY WITH STENT PLACEMENT  2015   • RHINOPLASTY  1975   • VASECTOMY          No current facility-administered medications  on file prior to encounter.      Current Outpatient Prescriptions on File Prior to Encounter   Medication Sig Dispense Refill   • aspirin 81 MG EC tablet Take 81 mg by mouth 2 (Two) Times a Day.     • atorvastatin (LIPITOR) 80 MG tablet TAKE 1 TABLET BY MOUTH EVERY NIGHT. 30 tablet 0   • cilostazol (PLETAL) 100 MG tablet Take 1 tablet by mouth 2 (Two) Times a Day.  3   • HYDROcodone-acetaminophen (NORCO) 5-325 MG per tablet Take 2 tablets by mouth Every 6 (Six) Hours As Needed for Moderate Pain . 24 tablet 0   • lisinopril (PRINIVIL,ZESTRIL) 5 MG tablet TAKE 1 TABLET BY MOUTH DAILY. 90 tablet 0   • lisinopril (PRINIVIL,ZESTRIL) 5 MG tablet TAKE 1 TABLET BY MOUTH DAILY. 90 tablet 1   • metoprolol tartrate (LOPRESSOR) 25 MG tablet TAKE 1/2 TABLET BY MOUTH TWICE DAILY. 90 tablet 3        ALLERGIES:    Allergies   Allergen Reactions   • No Known Drug Allergy         Social History     Social History   • Marital status:      Social History Main Topics   • Smoking status: Current Every Day Smoker     Packs/day: 0.50   • Smokeless tobacco: Current User      Comment: caffeine use/ patient stopped smoking today 10/31/18   • Alcohol use No      Comment: Denies but patient had alcohol smelling vomit upon arrival on unit    • Drug use: No   • Sexual activity: Defer     Other Topics Concern   • Not on file        Family History   Problem Relation Age of Onset   • Diabetes Mother    • Hypertension Mother    • Pancreatic cancer Mother    • Cancer Father    • Cancer Brother         Review of Systems   Constitutional: Negative for activity change, chills, fatigue and fever.   HENT: Negative for mouth sores, trouble swallowing and voice change.    Eyes: Negative for pain and visual disturbance.   Respiratory: Negative for cough, shortness of breath and wheezing.    Cardiovascular: Negative for chest pain and palpitations.   Gastrointestinal: Positive for abdominal pain. Negative for constipation, diarrhea, nausea and vomiting.    Genitourinary: Negative for difficulty urinating, frequency and urgency.   Musculoskeletal: Negative for arthralgias and joint swelling.   Skin: Negative for rash.   Neurological: Negative for dizziness, seizures, weakness and headaches.   Hematological: Negative for adenopathy. Does not bruise/bleed easily.   Psychiatric/Behavioral: Negative for behavioral problems and confusion. The patient is not nervous/anxious.         Objective     Vitals:    11/02/18 1213 11/02/18 1245 11/02/18 1246 11/02/18 1252   BP:       BP Location:       Patient Position:       Pulse:    91   Resp:       Temp:       TempSrc:       SpO2: 91% (!) 87% 93% 93%   Weight:       Height:         No flowsheet data found.    Physical Exam   Constitutional: He is oriented to person, place, and time. He appears well-developed and well-nourished. No distress.   HENT:   Head: Normocephalic.   Eyes: Pupils are equal, round, and reactive to light. Conjunctivae and EOM are normal. No scleral icterus.   Neck: Normal range of motion. Neck supple. No JVD present. No thyromegaly present.   Cardiovascular: Normal rate and regular rhythm.  Exam reveals no gallop and no friction rub.    No murmur heard.  Pulmonary/Chest: Effort normal and breath sounds normal. He has no wheezes. He has no rales.   Abdominal: Soft. He exhibits no distension and no mass. There is no tenderness.   Musculoskeletal: Normal range of motion. He exhibits no edema or deformity.   Lymphadenopathy:     He has no cervical adenopathy.   Neurological: He is alert and oriented to person, place, and time. He has normal reflexes. No cranial nerve deficit.   Skin: Skin is warm and dry. No rash noted. No erythema.   Psychiatric: He has a normal mood and affect. His behavior is normal. Judgment normal.         RECENT LABS:  Hematology WBC   Date Value Ref Range Status   11/02/2018 13.39 (H) 4.50 - 10.70 10*3/mm3 Final     RBC   Date Value Ref Range Status   11/02/2018 4.13 (L) 4.60 - 6.00  10*6/mm3 Final     Hemoglobin   Date Value Ref Range Status   11/02/2018 12.2 (L) 13.7 - 17.6 g/dL Final     Hematocrit   Date Value Ref Range Status   11/02/2018 37.6 (L) 40.4 - 52.2 % Final     Platelets   Date Value Ref Range Status   11/02/2018 139 (L) 140 - 500 10*3/mm3 Final            Lab Results   Component Value Date    GLUCOSE 132 (H) 11/02/2018    BUN 19 11/02/2018    CREATININE 1.69 (H) 11/02/2018    EGFRIFNONA 42 (L) 11/02/2018    BCR 11.2 11/02/2018    K 4.4 11/02/2018    CO2 23.9 11/02/2018    CALCIUM 8.7 11/02/2018    ALBUMIN 4.60 10/31/2018    AST 15 10/31/2018    ALT 14 10/31/2018     RENAL DOPPLER 11/2/2018  Interpretation Summary     · Normal right renal artery. Patent right renal vein.  · Abnormal study. Left renal artery appears patent with high resistive flow patterns consistent with distal occlusion. Left renal vein appears patent.       CTA 10/31/2018  IMPRESSION:  Patent endograft in good position within a fusiform  infrarenal abdominal aortic aneurysm measuring 4.4 cm in diameter that  is stable since a preceding CTA dated 2/20/2018. Fusiform dilatation of  both common iliac arteries appears to have increased slightly since the  preceding CTA. There is a moderately large amount of thrombus in the  right iliac limb of the endograft that has developed since the preceding  CTA. This produces proximally 70% luminal narrowing. A thin peripheral  rind of thrombus has also developed in the left iliac limb of the  endograft. There is focal moderate to high-grade stenosis in the  proximal left main renal artery that is unchanged. The left main renal  artery is opacified, and appears patent. However, there is complete lack  of enhancement within the renal parenchyma on the left indicating renal  infarction. A chronic infarct in the lower pole of the right kidney is  unchanged. There is mild colonic diverticulosis without evidence of  diverticulitis.    CXR 11/2/2018  IMPRESSION:  1.  Bibasilar  pulmonary opacification which is increased since 8/3/2018  and is favored to represent atelectasis; however, sequela of acute on  chronic aspiration would appear similar and correlation with patient  history is recommended.  2.  Visualized small and large bowel loops are mild to moderately  distended, increased since CTA abdomen and pelvis 10/31/2019. Further  evaluation with CT abdomen and pelvis is recommended.    Assessment/Plan   1.  Left renal infarction with occlusion of the distal left renal artery noted on Doppler scan.  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  On the CT imaging grandmother the abdomen they did mention moderately large amount of thrombus in the right iliac limb of the endograft producing 70% luminal narrowing.  2.  Current anticoagulation with Eliquis 5 mg twice a day.  3.  Pain related to his left renal infarct requiring IV morphine.  4.  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  We will pursue a laboratory hypercoagulable workup looking for any inherited or acquired thrombophilic defects that may have contributed to this current problem.  2.  We agree with anticoagulation with Eliquis for now.  3.  We will continue to follow along and make further recommendations based on the results of his thrombophilia workup.

## 2018-11-02 NOTE — PLAN OF CARE
Problem: Patient Care Overview  Goal: Plan of Care Review  Outcome: Ongoing (interventions implemented as appropriate)   11/02/18 6583   Plan of Care Review   Progress no change   OTHER   Outcome Summary Pt.'s oxygen dipped down to 85 during the morning. Applied 5L of oxygen to bring his O2 up to 92%. X-ray of his chest and ultrasound completed on his kidney. Complaints of pain were alleviated with Norco. Will continue to monitor.   Coping/Psychosocial   Plan of Care Reviewed With patient;spouse     Goal: Individualization and Mutuality  Outcome: Ongoing (interventions implemented as appropriate)    Goal: Discharge Needs Assessment  Outcome: Ongoing (interventions implemented as appropriate)      Problem: Pain, Acute (Adult)  Goal: Acceptable Pain Control/Comfort Level  Outcome: Ongoing (interventions implemented as appropriate)      Problem: Nausea/Vomiting (Adult)  Goal: Symptom Relief  Outcome: Ongoing (interventions implemented as appropriate)    Goal: Adequate Hydration  Outcome: Ongoing (interventions implemented as appropriate)

## 2018-11-02 NOTE — PLAN OF CARE
Problem: Patient Care Overview  Goal: Plan of Care Review  Outcome: Ongoing (interventions implemented as appropriate)   11/01/18 1943 11/01/18 2017 11/02/18 0738   Plan of Care Review   Progress no change --  --    OTHER   Outcome Summary --  --  Patient medicated PRN for pain. Patient NPO after midnight for a procedure today. Wife went home for the night but came back early this morning. Will continue to monitor vital signs, labs and comfort.   Coping/Psychosocial   Plan of Care Reviewed With --  patient;spouse --      Goal: Individualization and Mutuality  Outcome: Ongoing (interventions implemented as appropriate)    Goal: Discharge Needs Assessment  Outcome: Ongoing (interventions implemented as appropriate)    Goal: Interprofessional Rounds/Family Conf  Outcome: Ongoing (interventions implemented as appropriate)      Problem: Pain, Acute (Adult)  Goal: Identify Related Risk Factors and Signs and Symptoms  Outcome: Ongoing (interventions implemented as appropriate)    Goal: Acceptable Pain Control/Comfort Level  Outcome: Ongoing (interventions implemented as appropriate)      Problem: Nausea/Vomiting (Adult)  Goal: Identify Related Risk Factors and Signs and Symptoms  Outcome: Ongoing (interventions implemented as appropriate)    Goal: Symptom Relief  Outcome: Ongoing (interventions implemented as appropriate)    Goal: Adequate Hydration  Outcome: Ongoing (interventions implemented as appropriate)

## 2018-11-03 VITALS
BODY MASS INDEX: 26.75 KG/M2 | DIASTOLIC BLOOD PRESSURE: 82 MMHG | HEIGHT: 72 IN | TEMPERATURE: 98.1 F | SYSTOLIC BLOOD PRESSURE: 156 MMHG | OXYGEN SATURATION: 94 % | RESPIRATION RATE: 16 BRPM | WEIGHT: 197.5 LBS | HEART RATE: 95 BPM

## 2018-11-03 LAB
ANION GAP SERPL CALCULATED.3IONS-SCNC: 10.4 MMOL/L
BUN BLD-MCNC: 16 MG/DL (ref 8–23)
BUN/CREAT SERPL: 10 (ref 7–25)
CALCIUM SPEC-SCNC: 8.9 MG/DL (ref 8.6–10.5)
CHLORIDE SERPL-SCNC: 103 MMOL/L (ref 98–107)
CO2 SERPL-SCNC: 21.6 MMOL/L (ref 22–29)
CREAT BLD-MCNC: 1.6 MG/DL (ref 0.76–1.27)
DEPRECATED RDW RBC AUTO: 47.5 FL (ref 37–54)
ERYTHROCYTE [DISTWIDTH] IN BLOOD BY AUTOMATED COUNT: 14.2 % (ref 11.5–14.5)
GFR SERPL CREATININE-BSD FRML MDRD: 44 ML/MIN/1.73
GLUCOSE BLD-MCNC: 109 MG/DL (ref 65–99)
GLUCOSE BLDC GLUCOMTR-MCNC: 114 MG/DL (ref 70–130)
GLUCOSE BLDC GLUCOMTR-MCNC: 151 MG/DL (ref 70–130)
HCT VFR BLD AUTO: 35.9 % (ref 40.4–52.2)
HGB BLD-MCNC: 12 G/DL (ref 13.7–17.6)
MCH RBC QN AUTO: 30.2 PG (ref 27–32.7)
MCHC RBC AUTO-ENTMCNC: 33.4 G/DL (ref 32.6–36.4)
MCV RBC AUTO: 90.4 FL (ref 79.8–96.2)
PLATELET # BLD AUTO: 162 10*3/MM3 (ref 140–500)
PMV BLD AUTO: 11.5 FL (ref 6–12)
POTASSIUM BLD-SCNC: 4.3 MMOL/L (ref 3.5–5.2)
RBC # BLD AUTO: 3.97 10*6/MM3 (ref 4.6–6)
SODIUM BLD-SCNC: 135 MMOL/L (ref 136–145)
WBC NRBC COR # BLD: 10.32 10*3/MM3 (ref 4.5–10.7)

## 2018-11-03 PROCEDURE — 82962 GLUCOSE BLOOD TEST: CPT

## 2018-11-03 PROCEDURE — 80048 BASIC METABOLIC PNL TOTAL CA: CPT | Performed by: SURGERY

## 2018-11-03 PROCEDURE — 63710000001 INSULIN ASPART PER 5 UNITS: Performed by: SURGERY

## 2018-11-03 PROCEDURE — 85027 COMPLETE CBC AUTOMATED: CPT | Performed by: SURGERY

## 2018-11-03 PROCEDURE — 99231 SBSQ HOSP IP/OBS SF/LOW 25: CPT | Performed by: INTERNAL MEDICINE

## 2018-11-03 RX ORDER — SENNA AND DOCUSATE SODIUM 50; 8.6 MG/1; MG/1
2 TABLET, FILM COATED ORAL NIGHTLY
Qty: 60 TABLET | Refills: 0 | Status: SHIPPED | OUTPATIENT
Start: 2018-11-03 | End: 2019-07-10

## 2018-11-03 RX ORDER — HYDROCODONE BITARTRATE AND ACETAMINOPHEN 5; 325 MG/1; MG/1
1 TABLET ORAL EVERY 6 HOURS PRN
Qty: 30 TABLET | Refills: 0 | Status: SHIPPED | OUTPATIENT
Start: 2018-11-03 | End: 2018-11-12

## 2018-11-03 RX ADMIN — ASPIRIN 81 MG: 81 TABLET, DELAYED RELEASE ORAL at 09:02

## 2018-11-03 RX ADMIN — SODIUM CHLORIDE 75 ML/HR: 9 INJECTION, SOLUTION INTRAVENOUS at 04:21

## 2018-11-03 RX ADMIN — METOPROLOL TARTRATE 12.5 MG: 25 TABLET ORAL at 09:03

## 2018-11-03 RX ADMIN — CILOSTAZOL 100 MG: 100 TABLET ORAL at 09:02

## 2018-11-03 RX ADMIN — APIXABAN 5 MG: 5 TABLET, FILM COATED ORAL at 09:02

## 2018-11-03 RX ADMIN — BISACODYL 10 MG: 10 SUPPOSITORY RECTAL at 09:14

## 2018-11-03 RX ADMIN — HYDROCODONE BITARTRATE AND ACETAMINOPHEN 1 TABLET: 5; 325 TABLET ORAL at 01:35

## 2018-11-03 RX ADMIN — INSULIN ASPART 2 UNITS: 100 INJECTION, SOLUTION INTRAVENOUS; SUBCUTANEOUS at 11:49

## 2018-11-03 NOTE — PROGRESS NOTES
LOS: 3 days       Chief Complaint:  Prior stent graft repair of abdominal aortic aneurysm, currently with left renal infarction and CARLOS.      Interval History: The patient has no complaints today, denies significant flank pain.  He has not experienced any bleeding issues.  There are plans today for him to be discharged home.        Review of Systems:    Review of systems was obtained with pertinent positive findings as noted in the interval history.  All other systems negative.    Objective     Vital Signs  Temp:  [97.4 °F (36.3 °C)-98.1 °F (36.7 °C)] 98.1 °F (36.7 °C)  Heart Rate:  [77-95] 95  Resp:  [14-16] 16  BP: (117-156)/(58-82) 156/82        Physical Exam:     GENERAL:  Well-developed, well-nourished in no acute distress.   CHEST:  Lungs clear to percussion and auscultation. Good airflow.  CARDIAC:  Regular rate and rhythm without murmurs, rubs or gallops. Normal S1,S2.  ABDOMEN:  Soft, nontender with no organomegaly or masses.  EXTREMITIES:  No clubbing, cyanosis or edema.  NEUROLOGICAL:  Cranial Nerves II-XII grossly intact.  No focal neurological deficits.  PSYCHIATRIC:  Normal affect and mood.           Results Review:     I reviewed the patient's new clinical results.      Results from last 7 days  Lab Units 11/03/18  0732   WBC 10*3/mm3 10.32   HEMOGLOBIN g/dL 12.0*   HEMATOCRIT % 35.9*   PLATELETS 10*3/mm3 162     Lab Results   Component Value Date    NEUTROABS 10.09 (H) 11/02/2018       Results from last 7 days  Lab Units 11/03/18  0732   SODIUM mmol/L 135*   POTASSIUM mmol/L 4.3   CHLORIDE mmol/L 103   CO2 mmol/L 21.6*   BUN mg/dL 16   CREATININE mg/dL 1.60*   GLUCOSE mg/dL 109*   CALCIUM mg/dL 8.9       Results from last 7 days  Lab Units 10/31/18  1719   INR  1.01   APTT seconds 28.7         Factor V Leiden mutation negative, prothrombin gene mutation negative        Medication Review: Yes     Assessment/Plan     1.  Left renal infarction with occlusion of the distal left renal artery  noted on Doppler scan.  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  On the CT abdomen they did mention moderately large amount of thrombus in the right iliac limb of the endograft producing 70% luminal narrowing, suggesting a more extensive thrombotic issue.  Renal artery Doppler 11/2/18 with left renal artery patent with high resistance to flow pattern was consistent with distal conclusion.  Left renal vein patent.  Normal right renal artery and vein.  2.  Current anticoagulation with Eliquis 5 mg twice a day.  3.  Pain related to his left renal infarct, currently improved.  4.  CARLOS: Secondary to left renal infarction, creatinine currently stable at 1.6     Plan:  1. Labs still pending today with protein C and S studies, lupus anticoagulant, anticardiolipin antibodies, anti-beta 2G P1 antibodies, anti-thrombin 3.  2. Note plans for discharge home today on oral Eliquis.  Discussed with Dr. Sandra to consider initial treatment with Eliquis at 10 mg twice daily ×7 days and then decrease to 5 mg twice daily for maintenance.  3. We will make arrangements for outpatient follow-up to review hypercoagulable evaluation results in the next 1-2 weeks.  If antiphospholipid studies are positive, would consider possible transition to Coumadin.      Discussed with patient and Dr Boaz Love MD  11/03/18  12:30 PM

## 2018-11-03 NOTE — DISCHARGE SUMMARY
Name: Sylvain Amador ADMIT: 10/31/2018   : 1960  PCP: Justina Smith APRN    MRN: [id number] LOS: 3 days   AGE/SEX: 58 y.o. male  ROOM: [facility]     Date of Admission: 10/31/2018  Date of Discharge:  11/3/2018    PCP: Justina Smith APRN      DISCHARGE DIAGNOSIS  Active Hospital Problems    Diagnosis Date Noted   • Abnormal radiologic findings on diagnostic imaging of left kidney [R93.422] 10/31/2018   • Renal infarct (CMS/HCC) [N28.0] 10/31/2018      Resolved Hospital Problems    Diagnosis Date Noted Date Resolved   No resolved problems to display.       SECONDARY DIAGNOSES  Past Medical History:   Diagnosis Date   • Atherosclerosis of native coronary artery of native heart without angina pectoris    • CAD (coronary artery disease)    • Chest pain    • Coronary atherosclerosis of native coronary vessel    • Hyperlipidemia    • Hypertension    • Intermittent claudication (CMS/HCC)    • PAD (peripheral artery disease) (CMS/HCC)     of lower extremities       CONSULTS   Consults     Date and Time Order Name Status Description    2018 1522 Hematology & Oncology Inpatient Consult Completed     2018 0840 Inpatient Nephrology Consult      10/31/2018 1657 Surgery (on-call MD unless specified) Completed           PROCEDURES PERFORMED    HOSPITAL COURSE  Patient is a 58 y.o. male presented to [facility] admitted for complete evaluation of left renal infarction..  Please see the admitting history and physical for further details.  Cr remained stable in the 1.6 range.  His situation is somewhat atypical in that his renal artery appears widely patent now to its tertiary branches on CT angiogram and on renal duplex renal vein appears patent.  No clear-cut reason for his vascular renal issues.  Nevertheless hematology is seen him and ordered a number of hypercoagulable panel studies.  They've recommended he be discharged on 10 mg by mouth twice a day of Eliquis for 7 days and  "then transition to 5 mg by mouth twice a day.  They will make arrangements to see him in the office in 1-2 weeks.      VITAL SIGNS  /82   Pulse 95   Temp 98.1 °F (36.7 °C) (Oral)   Resp 16   Ht 182.9 cm (72\")   Wt 89.6 kg (197 lb 8 oz)   SpO2 94%   BMI 26.79 kg/m²   Objective  CONDITION ON DISCHARGE  Stable.      DISCHARGE DISPOSITION   Home or Self Care      DISCHARGE MEDICATIONS     Discharge Medications      New Medications      Instructions Start Date   apixaban 5 MG tablet tablet  Commonly known as:  ELIQUIS   5 mg, Oral, Every 12 Hours Scheduled      apixaban 5 MG tablet tablet  Commonly known as:  ELIQUIS   10 mg, Oral, Every 12 Hours Scheduled      sennosides-docusate sodium 8.6-50 MG tablet  Commonly known as:  SENOKOT-S   2 tablets, Oral, Nightly         Changes to Medications      Instructions Start Date   HYDROcodone-acetaminophen 5-325 MG per tablet  Commonly known as:  NORCO  What changed:  how much to take   1 tablet, Oral, Every 6 Hours PRN      metoprolol tartrate 25 MG tablet  Commonly known as:  LOPRESSOR  What changed:  See the new instructions.   25 mg, Oral, 2 Times Daily         Continue These Medications      Instructions Start Date   aspirin 81 MG EC tablet   81 mg, Oral, 2 Times Daily      atorvastatin 80 MG tablet  Commonly known as:  LIPITOR   80 mg, Oral, Nightly      cilostazol 100 MG tablet  Commonly known as:  PLETAL   1 tablet, Oral, 2 Times Daily         Stop These Medications    lisinopril 5 MG tablet  Commonly known as:  PRINIVIL,ZESTRIL           Future Appointments  Date Time Provider Department Center   11/6/2018 10:45 [facility]     Follow-up Information     Arturo Huynh MD Follow up in 2 week(s).    Specialty:  Vascular Surgery  Why:  Follow up in 2-4 weeks.   Contact information:  [address] [place] [id number] [phone]             Chino Lopez MD Follow up in 1 week(s).    Specialties:  Hematology and Oncology, " Oncology, Hematology  Why:  1-2 weeks to discuss results of hypercoag work up   Contact information:  [address]  [address]  [facility] [id number] [phone]             Seferino Posadas MD Follow up in 2 week(s).    Specialty:  Nephrology  Why:  2-4 weeks to follow for kdieny issues.   Contact information:  [address]  [address]  [facility] [id number] [phone]             Justina Smith, APRN .    Specialty:  Family Medicine  Contact information:  [address]  [address]  [facility] [address]  [phone]                   TEST  RESULTS PENDING AT DISCHARGE   Order Current Status    Antithrombin III In process    Beta-2 Glycoprotein Antibodies In process    Cardiolipin Antibody In process    Lupus Anticoagulant In process    Protein C Activity In process    Protein S Antigen, Free In process    Protein S Functional In process           Billin, less than 30 min spent    Shan Sandra MD  Office Number [phone]    18  12:26 PM

## 2018-11-03 NOTE — PROGRESS NOTES
"   LOS: 3 days    Patient Care Team:  Justina Smith APRN as PCP - General    Chief Complaint:    Chief Complaint   Patient presents with   • Abdominal Pain     Follow UP CARLOS  Subjective     Interval History:   No flank or abd pain.  No soa or n/v.  oscar po .  constipated    Objective     Vital Signs  Temp:  [97.1 °F (36.2 °C)-98.1 °F (36.7 °C)] 98.1 °F (36.7 °C)  Heart Rate:  [77-95] 95  Resp:  [14-18] 16  BP: (117-156)/(58-82) 156/82    Flowsheet Rows      First Filed Value   Admission Height  182.9 cm (72\") Documented at 10/31/2018 1140   Admission Weight  94.3 kg (208 lb) Documented at 10/31/2018 1221          No intake/output data recorded.  I/O last 3 completed shifts:  In: 1433 [P.O.:480; I.V.:953]  Out: -     Intake/Output Summary (Last 24 hours) at 11/03/18 0937  Last data filed at 11/03/18 0421   Gross per 24 hour   Intake             1433 ml   Output                0 ml   Net             1433 ml       Physical Exam:  gen nad alert  Neck supple no jvd  Lungs CTA bilat  cv RRR no m/g  abd soft NT/ND  vasc no pedal edema 2+ radial pulses      Results Review:      Results from last 7 days  Lab Units 11/03/18  0732 11/02/18  0611 11/01/18  0855 10/31/18  1245   SODIUM mmol/L 135* 131* 130* 136   POTASSIUM mmol/L 4.3 4.4 4.6 4.1   CHLORIDE mmol/L 103 99 98 100   CO2 mmol/L 21.6* 23.9 21.5* 21.8*   BUN mg/dL 16 19 22 20   CREATININE mg/dL 1.60* 1.69* 1.66* 1.61*   CALCIUM mg/dL 8.9 8.7 8.6 9.9   BILIRUBIN mg/dL  --   --   --  0.4   ALK PHOS U/L  --   --   --  73   ALT (SGPT) U/L  --   --   --  14   AST (SGOT) U/L  --   --   --  15   GLUCOSE mg/dL 109* 132* 162* 193*       Estimated Creatinine Clearance: 62.2 mL/min (A) (by C-G formula based on SCr of 1.6 mg/dL (H)).                  Results from last 7 days  Lab Units 11/03/18  0732 11/02/18  0611 10/31/18  1245   WBC 10*3/mm3 10.32 13.39* 12.57*   HEMOGLOBIN g/dL 12.0* 12.2* 15.0   PLATELETS 10*3/mm3 162 139* 193         Results from last 7 days  Lab " Units 10/31/18  1719   INR  1.01         Imaging Results (last 24 hours)     Procedure Component Value Units Date/Time    XR Chest PA & Lateral [[id number]] Collected:  11/02/18 1051     Updated:  11/02/18 1059    Narrative:       Chest radiograph     HISTORY: Oxygen level continues to drop     TECHNIQUE: Two PA and lateral radiographs     COMPARISON: Chest radiograph 8/3/2018 and CTA abdomen and pelvis  10/31/1880 8/2/2018     FINDINGS:  Pulmonary opacification is present within the bilateral bases which has  increased since 8/3/2018. It assumes a relatively linear configuration  and parallels the hemidiaphragms. There is no pleural effusion. No  pneumothorax is seen. The heart is normal in size. Visualized large and  small bowel loops are mildly distended.       Impression:       1.  Bibasilar pulmonary opacification which is increased since 8/3/2018  and is favored to represent atelectasis; however, sequela of acute on  chronic aspiration would appear similar and correlation with patient  history is recommended.  2.  Visualized small and large bowel loops are mild to moderately  distended, increased since CTA abdomen and pelvis 10/31/2019. Further  evaluation with CT abdomen and pelvis is recommended.     This report was finalized on 11/2/2018 10:56 AM by Dr. Padilla Leblanc M.D.             apixaban 5 mg Oral Q12H   aspirin 81 mg Oral Daily   atorvastatin 80 mg Oral Nightly   bisacodyl 10 mg Rectal Once   cilostazol 100 mg Oral BID   insulin aspart 0-7 Units Subcutaneous 4x Daily With Meals & Nightly   metoprolol tartrate 12.5 mg Oral BID   sennosides-docusate sodium 2 tablet Oral Nightly       sodium chloride 75 mL/hr Last Rate: 75 mL/hr (11/03/18 0421)       Medication Review:   Current Facility-Administered Medications   Medication Dose Route Frequency Provider Last Rate Last Dose   • apixaban (ELIQUIS) tablet 5 mg  5 mg Oral Q12H Minesh Tellez Jr., MD   5 mg at 11/03/18 0902   • aspirin EC tablet 81  mg  81 mg Oral Daily Minesh Tellez Jr., MD   81 mg at 11/03/18 0902   • atorvastatin (LIPITOR) tablet 80 mg  80 mg Oral Nightly Minesh Tellez Jr., MD   80 mg at 11/02/18 2123   • bisacodyl (DULCOLAX) suppository 10 mg  10 mg Rectal Once Minesh Tellez Jr., MD       • cilostazol (PLETAL) tablet 100 mg  100 mg Oral BID Minesh Tellez Jr., MD   100 mg at 11/03/18 0902   • dextrose (D50W) 25 g/ 50mL Intravenous Solution 25 g  25 g Intravenous Q15 Min PRN Arturo Huynh MD       • dextrose (GLUTOSE) oral gel 15 g  15 g Oral Q15 Min PRN Arturo Huynh MD       • glucagon (human recombinant) (GLUCAGEN DIAGNOSTIC) injection 1 mg  1 mg Subcutaneous PRN Arturo Huynh MD       • HYDROcodone-acetaminophen (NORCO) 5-325 MG per tablet 1 tablet  1 tablet Oral Q6H PRN Minesh Tellez Jr., MD   1 tablet at 11/03/18 0135    Or   • HYDROcodone-acetaminophen (NORCO) 5-325 MG per tablet 2 tablet  2 tablet Oral Q6H PRN Minesh Tellez Jr., MD   2 tablet at 11/02/18 1916   • insulin aspart (novoLOG) injection 0-7 Units  0-7 Units Subcutaneous 4x Daily With Meals & Nightly Arturo Huynh MD       • metoprolol tartrate (LOPRESSOR) tablet 12.5 mg  12.5 mg Oral BID Minesh Tellez Jr., MD   12.5 mg at 11/03/18 0903   • Morphine injection 4 mg  4 mg Intravenous Q2H PRN Minesh Tellez Jr., MD   4 mg at 11/02/18 0349    And   • naloxone (NARCAN) injection 0.4 mg  0.4 mg Intravenous Q5 Min PRN Minesh Tellez Jr., MD       • ondansetron (ZOFRAN) tablet 4 mg  4 mg Oral Q6H PRN Minesh Tellez Jr., MD        Or   • ondansetron ODT (ZOFRAN-ODT) disintegrating tablet 4 mg  4 mg Oral Q6H PRN Minesh Tellez Jr., MD        Or   • ondansetron (ZOFRAN) injection 4 mg  4 mg Intravenous Q6H PRN Minesh Tellez Jr., MD   4 mg at 10/31/18 5503   • sennosides-docusate sodium (SENOKOT-S) 8.6-50 MG tablet 2 tablet  2 tablet Oral Nightly Shan Sandra  MD YADIEL   2 tablet at 11/02/18 2123   • sodium chloride 0.9 % infusion  75 mL/hr Intravenous Continuous Arturo Huynh MD 75 mL/hr at 11/03/18 0421 75 mL/hr at 11/03/18 0421       Assessment/Plan   Nonoliguric CARLOS--secondary to left renal infarct. Serum cr trending up slightly after CTA abdomen 10/31 but stable 1.6 mg/dL last few days (was 0.9mg/dl at baseline prior to endovascular repair of AAA on 8/2/18, 1.1 on 8/4/18). UA: no blood.  Euvolemic and K stable.  Mild acidosis.  .  Left renal infarct-per CTA abdomen and pelvis on 10/31/18. Now on eliquis 5mg po bid.  Suspect atheroembolic in nature  -- hypercoag w/u initiated by hematology  Hyponatremia-improved, Na 135   AAA--s/p endovascular repair 8/2/18.   HTN-- bp a bit high this AM.  ACEi on hold  PAD/PVD-severe. Per vascular. On pletal, lipitor, ASA.  CAD  Constipation - just took suppository    Plan  - d/c IVF  - inc metop 25 BID and cont to hold ACEi  - no objection to discharge from nephrology standpoint, can follow-up with me in 2-4 weeks      Abnormal radiologic findings on diagnostic imaging of left kidney    Renal infarct (CMS/HCC)              Seferino Posadas MD  11/03/18  9:37 AM

## 2018-11-03 NOTE — PLAN OF CARE
Problem: Patient Care Overview  Goal: Plan of Care Review  Outcome: Ongoing (interventions implemented as appropriate)   11/02/18 1756 11/02/18 2030 11/03/18 0528   Plan of Care Review   Progress no change --  --    OTHER   Outcome Summary --  --  Patient was medicated PRN for abdominal pain and headache. Patient is on 5L O2 to keep his O2 sats above 90%. Will continue to monitor vital signs, labs and comfort.   Coping/Psychosocial   Plan of Care Reviewed With --  patient;spouse --      Goal: Individualization and Mutuality  Outcome: Ongoing (interventions implemented as appropriate)    Goal: Discharge Needs Assessment  Outcome: Ongoing (interventions implemented as appropriate)    Goal: Interprofessional Rounds/Family Conf  Outcome: Ongoing (interventions implemented as appropriate)      Problem: Pain, Acute (Adult)  Goal: Identify Related Risk Factors and Signs and Symptoms  Outcome: Ongoing (interventions implemented as appropriate)    Goal: Acceptable Pain Control/Comfort Level  Outcome: Ongoing (interventions implemented as appropriate)      Problem: Nausea/Vomiting (Adult)  Goal: Identify Related Risk Factors and Signs and Symptoms  Outcome: Ongoing (interventions implemented as appropriate)    Goal: Symptom Relief  Outcome: Ongoing (interventions implemented as appropriate)    Goal: Adequate Hydration  Outcome: Ongoing (interventions implemented as appropriate)

## 2018-11-04 ENCOUNTER — READMISSION MANAGEMENT (OUTPATIENT)
Dept: CALL CENTER | Facility: HOSPITAL | Age: 58
End: 2018-11-04

## 2018-11-04 NOTE — OUTREACH NOTE
Prep Survey      Responses   Facility patient discharged from?  [place]   Is patient eligible?  Yes   Discharge diagnosis  Renal Infarct   Does the patient have one of the following disease processes/diagnoses(primary or secondary)?  Other   Does the patient have Home health ordered?  No   Is there a DME ordered?  No   Prep survey completed?  Yes          Dimitri Otero RN

## 2018-11-05 LAB
CARDIOLIPIN IGA SER IA-ACNC: <9 APL U/ML (ref 0–11)
CARDIOLIPIN IGG SER IA-ACNC: <9 GPL U/ML (ref 0–14)
CARDIOLIPIN IGM SER IA-ACNC: 10 MPL U/ML (ref 0–12)

## 2018-11-05 RX ORDER — ATORVASTATIN CALCIUM 80 MG/1
80 TABLET, FILM COATED ORAL NIGHTLY
Qty: 30 TABLET | Refills: 0 | Status: SHIPPED | OUTPATIENT
Start: 2018-11-05 | End: 2018-12-04 | Stop reason: SDUPTHER

## 2018-11-05 NOTE — PROGRESS NOTES
Case Management Discharge Note    Final Note: Discharged to home on 11/3/18. MIRELLA Campos RN, CCP    Destination     No service has been selected for the patient.      Durable Medical Equipment     No service has been selected for the patient.      Dialysis/Infusion     No service has been selected for the patient.      Home Medical Care     No service has been selected for the patient.      Social Care     No service has been selected for the patient.             Final Discharge Disposition Code: 01 - home or self-care

## 2018-11-05 NOTE — TELEPHONE ENCOUNTER
Lipid Panel (SEND OUT)  Lipid Panel (SEND OUT)   Component Value Ref Range Performed At   Triglycerides 581 (H) 0 - 149 mg/dL [facility]   Cholesterol 297 (H) 0 - 199 mg/dL [facility]   HDL Cholesterol 27 (L) >39 mg/dL [facility]   LDL Cholesterol-Calculated UNABLE TO CALCULATE 0 - 100 mg/dL [facility]   VLDL 116 (H) 5 - 40 mg/dL [facility]   CHOL/HDL Ratio 11.0       Pt recent lipid test 10/25/18. Same dose?

## 2018-11-06 ENCOUNTER — HOSPITAL ENCOUNTER (OUTPATIENT)
Dept: CT IMAGING | Facility: HOSPITAL | Age: 58
Discharge: HOME | End: 2018-11-06
Attending: SURGERY | Admitting: SURGERY

## 2018-11-06 DIAGNOSIS — I71.40 ABDOMINAL AORTIC ANEURYSM (AAA) WITHOUT RUPTURE (HCC): ICD-10-CM

## 2018-11-06 LAB
AT III PPP CHRO-ACNC: 94 % (ref 90–134)
CREAT BLDA-MCNC: 1.8 MG/DL (ref 0.6–1.3)
PROT C ACT/NOR PPP: 97 % (ref 86–163)
PROT S ACT/NOR PPP: 70 % (ref 70–127)
PROT S FREE PPP-ACNC: 107 % (ref 49–138)

## 2018-11-06 PROCEDURE — 25010000002 IOPAMIDOL 61 % SOLUTION: Performed by: SURGERY

## 2018-11-06 PROCEDURE — 74174 CTA ABD&PLVS W/CONTRAST: CPT

## 2018-11-06 PROCEDURE — 82565 ASSAY OF CREATININE: CPT

## 2018-11-06 RX ADMIN — IOPAMIDOL 95 ML: 612 INJECTION, SOLUTION INTRAVENOUS at 10:23

## 2018-11-07 ENCOUNTER — READMISSION MANAGEMENT (OUTPATIENT)
Dept: CALL CENTER | Facility: HOSPITAL | Age: 58
End: 2018-11-07

## 2018-11-07 NOTE — OUTREACH NOTE
Medical Week 1 Survey      Responses   Facility patient discharged from?  [place]   Does the patient have one of the following disease processes/diagnoses(primary or secondary)?  Other   Is there a successful TCM telephone encounter documented?  No   Week 1 attempt successful?  Yes   Call start time  1630   Call end time  1634   Discharge diagnosis  Renal Infarct   Is patient permission given to speak with other caregiver?  Yes   List who call center can speak with  wife   Person spoke with today (if not patient) and relationship  wife   Meds reviewed with patient/caregiver?  Yes   Is the patient having any side effects they believe may be caused by any medication additions or changes?  No   Does the patient have all medications ordered at discharge?  Yes   Is the patient taking all medications as directed (includes completed medication regime)?  Yes   Medication comments  .   Does the patient have a primary care provider?   Yes   Does the patient have an appointment with their PCP within 7 days of discharge?  Yes   Has the patient kept scheduled appointments due by today?  N/A   Psychosocial issues?  No   Did the patient receive a copy of their discharge instructions?  Yes   Nursing interventions  Reviewed instructions with patient   What is the patient's perception of their health status since discharge?  Improving   Is the patient/caregiver able to teach back signs and symptoms related to disease process for when to call PCP?  Yes   Is the patient/caregiver able to teach back signs and symptoms related to disease process for when to call 911?  Yes   Is the patient/caregiver able to teach back the hierarchy of who to call/visit for symptoms/problems? PCP, Specialist, Home health nurse, Urgent Care, ED, 911  Yes   Week 1 call completed?  Yes          Eleanor Garnica RN

## 2018-11-08 LAB
B2 GLYCOPROT1 IGA SER-ACNC: <9 GPI IGA UNITS (ref 0–25)
B2 GLYCOPROT1 IGG SER-ACNC: <9 GPI IGG UNITS (ref 0–20)
B2 GLYCOPROT1 IGM SER-ACNC: <9 GPI IGM UNITS (ref 0–32)

## 2018-11-12 LAB
LA NT DPL PPP: 63.6 SEC (ref 0–55)
LA NT DPL/LA NT HPL PPP-RTO: 0.97 RATIO (ref 0–1.4)
LA NT PLATELET PPP: 47.9 SEC (ref 0–51.9)
SCREEN DRVVT: ABNORMAL SEC
THROMBIN TIME: 12.8 SEC (ref 0–23)

## 2018-11-15 ENCOUNTER — READMISSION MANAGEMENT (OUTPATIENT)
Dept: CALL CENTER | Facility: HOSPITAL | Age: 58
End: 2018-11-15

## 2018-11-15 NOTE — OUTREACH NOTE
Medical Week 2 Survey      Responses   Facility patient discharged from?  [place]   Does the patient have one of the following disease processes/diagnoses(primary or secondary)?  Other   Week 2 attempt successful?  No   Unsuccessful attempts  Attempt 1          Jessica Neely RN

## 2018-11-19 ENCOUNTER — READMISSION MANAGEMENT (OUTPATIENT)
Dept: CALL CENTER | Facility: HOSPITAL | Age: 58
End: 2018-11-19

## 2018-11-19 NOTE — OUTREACH NOTE
Medical Week 2 Survey      Responses   Facility patient discharged from?  [employer]   Does the patient have one of the following disease processes/diagnoses(primary or secondary)?  Other   Week 2 attempt successful?  No   Unsuccessful attempts  Attempt 2          China Smith RN

## 2018-12-04 RX ORDER — ATORVASTATIN CALCIUM 80 MG/1
TABLET, FILM COATED ORAL
Qty: 30 TABLET | Refills: 0 | Status: SHIPPED | OUTPATIENT
Start: 2018-12-04 | End: 2019-07-10

## 2019-01-22 ENCOUNTER — TELEPHONE (OUTPATIENT)
Dept: CARDIOLOGY | Facility: CLINIC | Age: 59
End: 2019-01-22

## 2019-01-22 NOTE — TELEPHONE ENCOUNTER
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 Pt last seen 5/1/18 and will follow up in a year.......Please advise      Thanks  Lala OMALLEY

## 2019-07-10 ENCOUNTER — OFFICE VISIT (OUTPATIENT)
Dept: FAMILY MEDICINE CLINIC | Facility: CLINIC | Age: 59
End: 2019-07-10

## 2019-07-10 VITALS
HEART RATE: 83 BPM | OXYGEN SATURATION: 98 % | TEMPERATURE: 98.7 F | DIASTOLIC BLOOD PRESSURE: 98 MMHG | SYSTOLIC BLOOD PRESSURE: 162 MMHG | HEIGHT: 72 IN | WEIGHT: 211 LBS | BODY MASS INDEX: 28.58 KG/M2

## 2019-07-10 DIAGNOSIS — I10 ESSENTIAL HYPERTENSION: ICD-10-CM

## 2019-07-10 DIAGNOSIS — I73.9 PAD (PERIPHERAL ARTERY DISEASE) (HCC): ICD-10-CM

## 2019-07-10 DIAGNOSIS — E78.5 HYPERLIPIDEMIA, UNSPECIFIED HYPERLIPIDEMIA TYPE: ICD-10-CM

## 2019-07-10 DIAGNOSIS — N28.0 RENAL INFARCT (HCC): ICD-10-CM

## 2019-07-10 DIAGNOSIS — Z86.39 HISTORY OF UNCONTROLLED DIABETES: ICD-10-CM

## 2019-07-10 DIAGNOSIS — F17.210 CIGARETTE SMOKER: ICD-10-CM

## 2019-07-10 DIAGNOSIS — Z79.899 HIGH RISK MEDICATION USE: ICD-10-CM

## 2019-07-10 DIAGNOSIS — I25.10 ATHEROSCLEROSIS OF NATIVE CORONARY ARTERY OF NATIVE HEART WITHOUT ANGINA PECTORIS: Primary | ICD-10-CM

## 2019-07-10 DIAGNOSIS — N40.0 PROSTATISM: ICD-10-CM

## 2019-07-10 PROCEDURE — 99204 OFFICE O/P NEW MOD 45 MIN: CPT | Performed by: FAMILY MEDICINE

## 2019-07-10 RX ORDER — ATORVASTATIN CALCIUM 80 MG/1
80 TABLET, FILM COATED ORAL DAILY
Qty: 90 TABLET | Refills: 3 | Status: SHIPPED | OUTPATIENT
Start: 2019-07-10 | End: 2020-01-16

## 2019-07-10 RX ORDER — ASPIRIN 81 MG/1
81 TABLET ORAL DAILY
Qty: 100 TABLET | Refills: 11
Start: 2019-07-10 | End: 2020-01-16

## 2019-07-10 RX ORDER — LISINOPRIL 5 MG/1
5 TABLET ORAL DAILY
Qty: 90 TABLET | Refills: 3 | Status: SHIPPED | OUTPATIENT
Start: 2019-07-10 | End: 2020-08-25

## 2019-07-10 NOTE — PROGRESS NOTES
HPI  Sylvain Amador is a 58 y.o. male who is here to establish a new primary care physician.  Apparently had lost his job and stopped all his medication.  Currently a  and does have to have annual DOT physicals usually through his vascular surgeon.  This is due at this time.  Also followed by cardiologist.  Patient denies any chest pain or new complaints.  Has had multiple stent placements and discussed importance of staying on medications as noted below.  Will renew all necessary medicines and recommend resuming aspirin 81 mg daily.  Does need to discuss with vascular surgeons if needs to resume anticoagulation therapy??  Apparently could not afford Eliquis and was never started?  Was told by cardiologist could stop antiplatelet medication given after stent.  Extensive history noted and reviewed.  This includes aortic aneurysm repair and apparently multiple other stents were required.  Previously diagnosed with diabetes and at one time was on metformin but all of this also has been discontinued.  Metformin did cause significant diarrhea for which patient was taking Imodium.  Diarrhea specifically was interfering with his job as a .      Review of Systems   Respiratory: Negative for shortness of breath.    Cardiovascular: Negative for chest pain, palpitations and leg swelling.   All other systems reviewed and are negative.        Past Medical History:   Diagnosis Date   • Atherosclerosis of native coronary artery of native heart without angina pectoris    • CAD (coronary artery disease)    • Chest pain    • Coronary atherosclerosis of native coronary vessel    • Diabetes mellitus (CMS/HCC)    • GERD (gastroesophageal reflux disease)    • Heart attack (CMS/HCC)    • Hyperlipidemia    • Hypertension    • Intermittent claudication (CMS/HCC)    • PAD (peripheral artery disease) (CMS/HCC)     of lower extremities   • Recovering alcoholic (CMS/HCC)        Past Surgical History:   Procedure  Laterality Date   • ARTERIOGRAM AORTIC Left 8/2/2018    Procedure: AORTIC AND LEFT ILIAC ARTERY ANEURYSM REPAIR;  Surgeon: Arturo Huynh MD;  Location: [facility];  Service: Vascular   • CARDIAC CATHETERIZATION  11/2015   • CORONARY ANGIOPLASTY WITH STENT PLACEMENT  2015   • RHINOPLASTY  1975   • VASECTOMY         Family History   Problem Relation Age of Onset   • Diabetes Mother    • Hypertension Mother    • Pancreatic cancer Mother    • Cancer Mother    • Esophageal cancer Father    • Cancer Father    • Esophageal cancer Brother    • Cancer Brother    • Cancer Paternal Grandmother    • Diabetes Paternal Grandmother    • Alcohol abuse Brother    • Cancer Brother        Social History     Socioeconomic History   • Marital status:      Spouse name: Not on file   • Number of children: Not on file   • Years of education: Not on file   • Highest education level: Not on file   Occupational History     Employer: [employer]   Tobacco Use   • Smoking status: Current Every Day Smoker     Packs/day: 0.50   • Smokeless tobacco: Current User   • Tobacco comment: caffeine use/ patient stopped smoking today 10/31/18   Substance and Sexual Activity   • Alcohol use: No     Comment: Denies but patient had alcohol smelling vomit upon arrival on unit    • Drug use: No   • Sexual activity: Defer         Physical Exam   Constitutional: He is oriented to person, place, and time. He appears well-developed and well-nourished. No distress.   HENT:   Head: Normocephalic and atraumatic.   Nose: Nose normal.   Mouth/Throat: Uvula is midline, oropharynx is clear and moist and mucous membranes are normal. Tonsils are 0 on the right. Tonsils are 0 on the left.       Eyes: Conjunctivae and EOM are normal. Pupils are equal, round, and reactive to light.   Neck: Normal range of motion. Neck supple.   Cardiovascular: Normal rate, regular rhythm and normal heart sounds.   Pulmonary/Chest: Effort normal and  breath sounds normal.   Abdominal: Soft. He exhibits no distension and no mass. There is no tenderness.   Musculoskeletal: Normal range of motion. He exhibits no edema, tenderness or deformity.   Neurological: He is alert and oriented to person, place, and time. He exhibits normal muscle tone. Coordination normal.   Skin: Skin is warm and dry. No rash noted.   Psychiatric: He has a normal mood and affect. His behavior is normal. Judgment and thought content normal.   Nursing note and vitals reviewed.        Assessment/Plan    Sylvain was seen today for establish care, elbow problem and labs only.    Diagnoses and all orders for this visit:    Atherosclerosis of native coronary artery of native heart without angina pectoris  -     Discontinue: metoprolol tartrate (LOPRESSOR) 25 MG tablet; Take 1 tablet by mouth 2 (Two) Times a Day.  -     atorvastatin (LIPITOR) 80 MG tablet; Take 1 tablet by mouth Daily.  -     lisinopril (PRINIVIL,ZESTRIL) 5 MG tablet; Take 1 tablet by mouth Daily.  -     aspirin 81 MG EC tablet; Take 1 tablet by mouth Daily.  -     metoprolol tartrate (LOPRESSOR) 25 MG tablet; Take 1 tablet by mouth 2 (Two) Times a Day.    PAD (peripheral artery disease) (CMS/HCC)  -     aspirin 81 MG EC tablet; Take 1 tablet by mouth Daily.    Hyperlipidemia, unspecified hyperlipidemia type  -     Lipid Panel  -     atorvastatin (LIPITOR) 80 MG tablet; Take 1 tablet by mouth Daily.    Essential hypertension  -     Discontinue: metoprolol tartrate (LOPRESSOR) 25 MG tablet; Take 1 tablet by mouth 2 (Two) Times a Day.  -     lisinopril (PRINIVIL,ZESTRIL) 5 MG tablet; Take 1 tablet by mouth Daily.  -     metoprolol tartrate (LOPRESSOR) 25 MG tablet; Take 1 tablet by mouth 2 (Two) Times a Day.    Renal infarct (CMS/HCC)    Prostatism  -     PSA DIAGNOSTIC    History of uncontrolled diabetes  -     Hemoglobin A1c  -     lisinopril (PRINIVIL,ZESTRIL) 5 MG tablet; Take 1 tablet by mouth Daily.    High risk medication use  -      CBC & Differential  -     Comprehensive Metabolic Panel    Cigarette smoker    Patient with multiple vascular diseases some of which are noted above.  Apparently had lost insurance and stopped all his medication and discussed importance of resuming many of these as noted.  Will send prescriptions to renew.  Specifically cholesterol levels extremely elevated in the past and will start statin therapy and repeat lab work in 3 months.  Also reported history of diabetes currently on no medication.  Will await results to determine what and which medication will need to be resumed.  Health history form also completed and reviewed.  Strongly recommend follow-up appointment with cardiologist and vascular surgeon as soon as possible.  Also strongly recommended discontinuing cigarette use especially with known vascular diseases.    This note includes information entered using a voice recognition dictation system.  Though reviewed, some nonsensible errors may remain.

## 2019-07-11 DIAGNOSIS — E11.59 TYPE 2 DIABETES MELLITUS WITH OTHER CIRCULATORY COMPLICATION, WITHOUT LONG-TERM CURRENT USE OF INSULIN (HCC): Primary | ICD-10-CM

## 2019-07-11 LAB
ALBUMIN SERPL-MCNC: 4.7 G/DL (ref 3.5–5.2)
ALBUMIN/GLOB SERPL: 1.6 G/DL
ALP SERPL-CCNC: 79 U/L (ref 39–117)
ALT SERPL-CCNC: 18 U/L (ref 1–41)
AST SERPL-CCNC: 10 U/L (ref 1–40)
BASOPHILS # BLD AUTO: (no result) 10*3/UL
BASOPHILS # BLD MANUAL: 0.24 10*3/MM3 (ref 0–0.2)
BASOPHILS NFR BLD MANUAL: 3 % (ref 0–1.5)
BILIRUB SERPL-MCNC: 0.3 MG/DL (ref 0.2–1.2)
BUN SERPL-MCNC: 15 MG/DL (ref 8–23)
BUN/CREAT SERPL: 12.1 (ref 7–25)
CALCIUM SERPL-MCNC: 9.6 MG/DL (ref 8.6–10.5)
CHLORIDE SERPL-SCNC: 100 MMOL/L (ref 98–107)
CHOLEST SERPL-MCNC: 329 MG/DL (ref 0–200)
CO2 SERPL-SCNC: 25.5 MMOL/L (ref 22–29)
CREAT SERPL-MCNC: 1.24 MG/DL (ref 0.76–1.27)
DIFFERENTIAL COMMENT: ABNORMAL
EOSINOPHIL # BLD AUTO: (no result) 10*3/UL
EOSINOPHIL # BLD MANUAL: 0.4 10*3/MM3 (ref 0–0.4)
EOSINOPHIL NFR BLD AUTO: (no result) %
EOSINOPHIL NFR BLD MANUAL: 5 % (ref 0.3–6.2)
ERYTHROCYTE [DISTWIDTH] IN BLOOD BY AUTOMATED COUNT: 14.6 % (ref 12.3–15.4)
GLOBULIN SER CALC-MCNC: 3 GM/DL
GLUCOSE SERPL-MCNC: 223 MG/DL (ref 65–99)
HBA1C MFR BLD: 8.5 % (ref 4.8–5.6)
HCT VFR BLD AUTO: 51.2 % (ref 37.5–51)
HDLC SERPL-MCNC: 34 MG/DL (ref 40–60)
HGB BLD-MCNC: 16.5 G/DL (ref 13–17.7)
LDLC SERPL CALC-MCNC: ABNORMAL MG/DL
LYMPHOCYTES # BLD AUTO: (no result) 10*3/UL
LYMPHOCYTES # BLD MANUAL: 2.14 10*3/MM3 (ref 0.7–3.1)
LYMPHOCYTES NFR BLD AUTO: (no result) %
LYMPHOCYTES NFR BLD MANUAL: 26.7 % (ref 19.6–45.3)
MCH RBC QN AUTO: 30.1 PG (ref 26.6–33)
MCHC RBC AUTO-ENTMCNC: 32.2 G/DL (ref 31.5–35.7)
MCV RBC AUTO: 93.4 FL (ref 79–97)
MONOCYTES # BLD MANUAL: 0.4 10*3/MM3 (ref 0.1–0.9)
MONOCYTES NFR BLD AUTO: (no result) %
MONOCYTES NFR BLD MANUAL: 5 % (ref 5–12)
NEUTROPHILS # BLD MANUAL: 4.84 10*3/MM3 (ref 1.7–7)
NEUTROPHILS NFR BLD AUTO: (no result) %
NEUTROPHILS NFR BLD MANUAL: 60.4 % (ref 42.7–76)
PLATELET # BLD AUTO: 178 10*3/MM3 (ref 140–450)
PLATELET BLD QL SMEAR: ABNORMAL
POTASSIUM SERPL-SCNC: 4.8 MMOL/L (ref 3.5–5.2)
PROT SERPL-MCNC: 7.7 G/DL (ref 6–8.5)
PSA SERPL-MCNC: 1.04 NG/ML (ref 0–4)
RBC # BLD AUTO: 5.48 10*6/MM3 (ref 4.14–5.8)
RBC MORPH BLD: ABNORMAL
SODIUM SERPL-SCNC: 138 MMOL/L (ref 136–145)
TRIGL SERPL-MCNC: 498 MG/DL (ref 0–150)
VLDLC SERPL CALC-MCNC: ABNORMAL MG/DL
WBC # BLD AUTO: 8.02 10*3/MM3 (ref 3.4–10.8)

## 2019-07-11 RX ORDER — GLIMEPIRIDE 2 MG/1
2 TABLET ORAL
Qty: 90 TABLET | Refills: 3 | Status: SHIPPED | OUTPATIENT
Start: 2019-07-11 | End: 2019-07-11 | Stop reason: ALTCHOICE

## 2019-09-23 ENCOUNTER — OFFICE VISIT (OUTPATIENT)
Dept: FAMILY MEDICINE CLINIC | Facility: CLINIC | Age: 59
End: 2019-09-23

## 2019-09-23 VITALS
HEIGHT: 72 IN | OXYGEN SATURATION: 95 % | DIASTOLIC BLOOD PRESSURE: 66 MMHG | TEMPERATURE: 99.1 F | SYSTOLIC BLOOD PRESSURE: 104 MMHG | HEART RATE: 82 BPM | WEIGHT: 198.2 LBS | BODY MASS INDEX: 26.84 KG/M2

## 2019-09-23 DIAGNOSIS — R06.02 SHORTNESS OF BREATH: ICD-10-CM

## 2019-09-23 DIAGNOSIS — R05.9 COUGH: Primary | ICD-10-CM

## 2019-09-23 PROCEDURE — 99214 OFFICE O/P EST MOD 30 MIN: CPT | Performed by: INTERNAL MEDICINE

## 2019-09-23 RX ORDER — BROMPHENIRAMINE MALEATE, PSEUDOEPHEDRINE HYDROCHLORIDE, AND DEXTROMETHORPHAN HYDROBROMIDE 2; 30; 10 MG/5ML; MG/5ML; MG/5ML
5 SYRUP ORAL 4 TIMES DAILY PRN
Qty: 118 ML | Refills: 1 | Status: SHIPPED | OUTPATIENT
Start: 2019-09-23 | End: 2019-10-04

## 2019-09-23 NOTE — PROGRESS NOTES
Subjective  Complaint is cough and congestion  Sylvain Amador is a 59 y.o. male.     History of Present Illness   Sylvain is here today for complaints of persistent internmittent cough and congestion.  This began approximately 2 weeks ago.  NyQuil has not helped this.  He works as a .  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  They told him it was not mold.  However shortly thereafter he began having sore throat.  This has progressed to a cough.  There have been associated feelings of hot and cold but not necessarily chills.  He does feel more short of breath than usual.  His oxygen saturation is slightly less than it was at his previous visit.  He is on lisinopril but this has not previously caused a cough.  Past history is remarkable for hyperlipidemia peripheral artery disease and a prior history of a renal artery infarct.  He is a 1 pack/day cigarette smoker.  He currently reports only smoking 5 or 6 cigarettes/day now that he has this respiratory problem  The following portions of the patient's history were reviewed and updated as appropriate: allergies, current medications, past family history, past medical history, past social history, past surgical history and problem list.    Review of Systems   Constitutional: Negative for chills and fever.   HENT: Positive for congestion and sore throat.    Respiratory: Positive for cough and shortness of breath.        Objective   Physical Exam   Constitutional: He appears well-developed and well-nourished.   HENT:   Tympanic membranes are normal.  There is some bilateral nasal congestion but no significant erythema.  Rhinorrhea appears to be clear.  Oropharynx shows some posterior pharyngeal erythema but no exudate.   Cardiovascular: Normal rate, regular rhythm and normal heart sounds.   Pulmonary/Chest: Effort normal and breath sounds normal. No respiratory distress. He has no wheezes. He  has no rales.   Nursing note and vitals reviewed.        Assessment/Plan   Sylvain was seen today for cough.    Diagnoses and all orders for this visit:    Cough  -     XR Chest PA & Lateral; Future    Shortness of breath  -     XR Chest PA & Lateral; Future    Other orders  -     brompheniramine-pseudoephedrine-DM 30-2-10 MG/5ML syrup; Take 5 mL by mouth 4 (Four) Times a Day As Needed for Congestion or Cough.    Sylvain is here today for complaints of cough and shortness of breath.  I did review a chest x-ray from 2018.  Had some bilateral platelike atelectasis.  No follow-up x-ray was done on this.  I am going to have him get a chest x-ray today but I feel most of his symptoms are going to be allergy or smoking related.  I am going to prescribe some cough medicine for him.  His blood pressure to my exam was 96/60.

## 2019-10-04 ENCOUNTER — OFFICE VISIT (OUTPATIENT)
Dept: FAMILY MEDICINE CLINIC | Facility: CLINIC | Age: 59
End: 2019-10-04

## 2019-10-04 VITALS
BODY MASS INDEX: 26.47 KG/M2 | TEMPERATURE: 98.5 F | WEIGHT: 195.4 LBS | DIASTOLIC BLOOD PRESSURE: 78 MMHG | RESPIRATION RATE: 16 BRPM | OXYGEN SATURATION: 99 % | HEIGHT: 72 IN | HEART RATE: 98 BPM | SYSTOLIC BLOOD PRESSURE: 134 MMHG

## 2019-10-04 DIAGNOSIS — J40 BRONCHITIS: Primary | ICD-10-CM

## 2019-10-04 DIAGNOSIS — H65.93 BILATERAL OTITIS MEDIA WITH EFFUSION: ICD-10-CM

## 2019-10-04 PROBLEM — N19 KIDNEY FAILURE: Status: ACTIVE | Noted: 2018-10-27

## 2019-10-04 PROBLEM — R07.9 CHEST PAIN: Status: ACTIVE | Noted: 2019-10-04

## 2019-10-04 PROBLEM — IMO0001 CHARCOT'S SYNDROME: Status: ACTIVE | Noted: 2019-10-04

## 2019-10-04 PROCEDURE — 99213 OFFICE O/P EST LOW 20 MIN: CPT | Performed by: FAMILY MEDICINE

## 2019-10-04 RX ORDER — AZITHROMYCIN 250 MG/1
TABLET, FILM COATED ORAL
Qty: 6 TABLET | Refills: 0 | Status: SHIPPED | OUTPATIENT
Start: 2019-10-04 | End: 2020-01-16

## 2019-10-04 RX ORDER — FLUTICASONE PROPIONATE 50 MCG
2 SPRAY, SUSPENSION (ML) NASAL DAILY
Qty: 15.8 ML | Refills: 0 | Status: SHIPPED | OUTPATIENT
Start: 2019-10-04 | End: 2020-01-16

## 2019-10-04 NOTE — PROGRESS NOTES
HPI  Sylvain Amador is a 59 y.o. male who is here for follow up assistant bronchitis.  Also complaining of ear pain especially on the left.  Says had similar ear problems 2 years ago and was treated with antibiotics.  Denies known allergy symptoms though has used Flonase in the past.      Review of Systems   Constitutional: Negative for chills, diaphoresis and fever.   HENT: Positive for congestion and ear discharge.    Respiratory: Positive for cough.    Neurological: Positive for headaches.   All other systems reviewed and are negative.        Past Medical History:   Diagnosis Date   • Atherosclerosis of native coronary artery of native heart without angina pectoris    • CAD (coronary artery disease)    • Chest pain    • Coronary atherosclerosis of native coronary vessel    • Diabetes mellitus (CMS/HCC)    • GERD (gastroesophageal reflux disease)    • Heart attack (CMS/HCC)    • Hyperlipidemia    • Hypertension    • Intermittent claudication (CMS/HCC)    • PAD (peripheral artery disease) (CMS/HCC)     of lower extremities   • Recovering alcoholic (CMS/HCC)        Past Surgical History:   Procedure Laterality Date   • ARTERIOGRAM AORTIC Left 8/2/2018    Procedure: AORTIC AND LEFT ILIAC ARTERY ANEURYSM REPAIR;  Surgeon: Arturo Huynh MD;  Location: [facility];  Service: Vascular   • CARDIAC CATHETERIZATION  11/2015   • CORONARY ANGIOPLASTY WITH STENT PLACEMENT  2015   • RHINOPLASTY  1975   • VASECTOMY         Family History   Problem Relation Age of Onset   • Diabetes Mother    • Hypertension Mother    • Pancreatic cancer Mother    • Cancer Mother    • Esophageal cancer Father    • Cancer Father    • Esophageal cancer Brother    • Cancer Brother    • Cancer Paternal Grandmother    • Diabetes Paternal Grandmother    • Alcohol abuse Brother    • Cancer Brother        Social History     Socioeconomic History   • Marital status:      Spouse name: Not on file   • Number of children: Not on file   •  Years of education: Not on file   • Highest education level: Not on file   Occupational History     Employer: [employer]   Tobacco Use   • Smoking status: Current Every Day Smoker     Packs/day: 0.50   • Smokeless tobacco: Current User   • Tobacco comment: caffeine use/ patient stopped smoking today 10/31/18   Substance and Sexual Activity   • Alcohol use: No     Comment: Denies but patient had alcohol smelling vomit upon arrival on unit    • Drug use: No   • Sexual activity: Defer         Physical Exam   Constitutional: He is oriented to person, place, and time. He appears well-developed and well-nourished. No distress.   HENT:   Head: Normocephalic and atraumatic.   Right Ear: Ear canal normal. Tympanic membrane is not injected and not erythematous. A middle ear effusion is present.   Left Ear: Ear canal normal. Tympanic membrane is not injected and not erythematous. A middle ear effusion is present.   Eyes: EOM are normal. Pupils are equal, round, and reactive to light.   Neck: Normal range of motion. Neck supple.   Cardiovascular: Normal rate, regular rhythm and normal heart sounds.   Pulmonary/Chest: Effort normal. No respiratory distress. He has no wheezes. He has no rales.   Neurological: He is alert and oriented to person, place, and time. Coordination normal.   Skin: Skin is warm and dry.   Psychiatric: He has a normal mood and affect. His behavior is normal. Judgment and thought content normal.   Nursing note and vitals reviewed.        Assessment/Plan    Sylvain was seen today for earache and cough.    Diagnoses and all orders for this visit:    Bronchitis    Bilateral otitis media with effusion    Other orders  -     fluticasone (FLONASE) 50 MCG/ACT nasal spray; 2 sprays into the nostril(s) as directed by provider Daily.  -     azithromycin (ZITHROMAX) 250 MG tablet; Take 2 tablets the first day, then 1 tablet daily for 4 days.      Patient here for a persistent bronchitis and also ear  pain.  Examination is fairly unremarkable except for probable fluid in both middle ears.  There is no erythema or other evidence of acute infection.  Patient does report some pain and tenderness in the left neck though no definite adenopathy is felt.  Throat is not inflamed and chest is basically clear.  In view of persistence as well as ear pain will start empiric antibiotic therapy as discussed and recommend resuming Flonase.  Call if symptoms worsen or persist.    This note includes information entered using a voice recognition dictation system.  Though reviewed, some nonsensible errors may remain.

## 2020-01-03 ENCOUNTER — TELEPHONE (OUTPATIENT)
Dept: CARDIOLOGY | Facility: CLINIC | Age: 60
End: 2020-01-03

## 2020-01-03 NOTE — TELEPHONE ENCOUNTER
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 Atherosclerosis of native coronary artery of native heart without angina pectoris  Stress Test With Myocardial Perfusion One Day     Comprehensive Metabolic Panel     Lipid Panel   2. Essential hypertension  Stress Test With Myocardial Perfusion One Day   3. Hyperlipidemia, unspecified hyperlipidemia type      4. PAD (peripheral artery disease)  Stress Test With Myocardial Perfusion One Day   5. Shortness of breath  Stress Test With Myocardial Perfusion One Day      1. CAD, s/p POBA to OM1 and PCI with stent to mid RCA.  No angina. Has dyspnea on exertion, set up stress nuclear study.  2. Peripheral arterial disease. Occlusion of bilateral superficial femoral arteries. Sees Dr. Huynh.  Is treating this with lifestyle changes and is claudication has improved.   3. Smoking. He is smoking.   4. Hyperlipidemia, on Atorvastatin.   5. HTN, well controlled.      Plan:       Advised smoking cessation, check lipids, set up stress study.  See jamison in 1 year, no med changes.      Coronary Artery Disease  Assessment  • The patient has no angina     Subjective - Objective  • There has been a previous stent procedure using JARRED  • Current antiplatelet therapy includes aspirin 81 mg

## 2020-01-06 NOTE — TELEPHONE ENCOUNTER
Pt called back and advised he is ok to see APRN, he would like to come to the main office. Sabrina has an availability 1/16 at 1030. Is ok to schedule here

## 2020-01-16 ENCOUNTER — OFFICE VISIT (OUTPATIENT)
Dept: CARDIOLOGY | Facility: CLINIC | Age: 60
End: 2020-01-16

## 2020-01-16 VITALS
HEART RATE: 77 BPM | DIASTOLIC BLOOD PRESSURE: 90 MMHG | BODY MASS INDEX: 28.58 KG/M2 | WEIGHT: 211 LBS | HEIGHT: 72 IN | SYSTOLIC BLOOD PRESSURE: 150 MMHG

## 2020-01-16 DIAGNOSIS — I71.40 ABDOMINAL AORTIC ANEURYSM (AAA) WITHOUT RUPTURE (HCC): ICD-10-CM

## 2020-01-16 DIAGNOSIS — F17.210 CIGARETTE SMOKER: ICD-10-CM

## 2020-01-16 DIAGNOSIS — E78.2 MIXED HYPERLIPIDEMIA: ICD-10-CM

## 2020-01-16 DIAGNOSIS — I25.10 ATHEROSCLEROSIS OF NATIVE CORONARY ARTERY OF NATIVE HEART WITHOUT ANGINA PECTORIS: ICD-10-CM

## 2020-01-16 DIAGNOSIS — Z02.89 ENCOUNTER FOR EXAMINATION REQUIRED BY DEPARTMENT OF TRANSPORTATION (DOT): Primary | ICD-10-CM

## 2020-01-16 DIAGNOSIS — I73.9 PAD (PERIPHERAL ARTERY DISEASE) (HCC): ICD-10-CM

## 2020-01-16 DIAGNOSIS — I10 ESSENTIAL HYPERTENSION: ICD-10-CM

## 2020-01-16 PROBLEM — R07.9 CHEST PAIN: Status: RESOLVED | Noted: 2019-10-04 | Resolved: 2020-01-16

## 2020-01-16 PROCEDURE — 93000 ELECTROCARDIOGRAM COMPLETE: CPT | Performed by: NURSE PRACTITIONER

## 2020-01-16 PROCEDURE — 99214 OFFICE O/P EST MOD 30 MIN: CPT | Performed by: NURSE PRACTITIONER

## 2020-01-16 RX ORDER — PRAVASTATIN SODIUM 20 MG
20 TABLET ORAL DAILY
Qty: 30 TABLET | Refills: 11 | Status: SHIPPED | OUTPATIENT
Start: 2020-01-16 | End: 2020-03-02

## 2020-01-16 NOTE — PROGRESS NOTES
Date of Office Visit: 2020  Encounter Provider: BELKIS Campbell  Place of Service: [facility] CARDIOLOGY  Patient Name: Sylvain Amador  :1960  Primary Cardiologist: Dr. Germania Peterson     Chief Complaint   Patient presents with   • Follow-up     DOT Clearance   :     Dear Dr. Alonso Granda,     HPI: Sylvain Amador is a pleasant 59 y.o. male who presents today for cardiac follow up and DOT clearance. He is a new patient to me and his previous records have been reviewed.    In 2014, he experienced midsternal chest pain radiating to his shoulders and arms associated with shortness of breath, nausea, dizziness, and cold chills.  He underwent cardiac catheterization and received a drug-eluting stent placement to the mid RCA and balloon angioplasty to the first obtuse marginal branch.     In May 2018, he had a myocardial perfusion study which showed no evidence of ischemia and EF of 58% and this was completed for DOT clearance.  In 2019, he received DOT clearance from Dr. Peterson.    On 2020 he saw BELKIS Peterson at [facility] and his vascular surgeon is Dr. Herbert Huynh.  He has been diagnosed with abdominal aortic aneurysm and peripheral arterial disease.  He had an aortic duplex completed in the office that day which shows the largest measurement of the aorta 3.91 cm.  GEETA studies were 0.6 bilaterally, unchanged from 2016.  During the office appointment, it was noted that he had stopped taking his aspirin and Pletal.  Smoking cessation was recommended.    He presents today for his follow-up visit.  Upon review of his medications, he stopped taking the aspirin, atorvastatin, and metoprolol.  He had diarrhea and when he stopped the atorvastatin his symptoms resolved.  He denies chest pain, shortness of air, PND, orthopnea, edema, palpitations, dizziness, or syncope.  His blood pressure is elevated today.  He is still  smoking 1/2 pack of cigarettes daily.    Past Medical History:   Diagnosis Date   • CAD (coronary artery disease)    • Diabetes mellitus (CMS/HCC)    • GERD (gastroesophageal reflux disease)    • Heart attack (CMS/HCC)    • Hyperlipidemia    • Hypertension    • Intermittent claudication (CMS/HCC)    • PAD (peripheral artery disease) (CMS/HCC)     of lower extremities   • Recovering alcoholic (CMS/HCC)        Past Surgical History:   Procedure Laterality Date   • ARTERIOGRAM AORTIC Left 8/2/2018    Procedure: AORTIC AND LEFT ILIAC ARTERY ANEURYSM REPAIR;  Surgeon: Arturo Huynh MD;  Location: [facility];  Service: Vascular   • CARDIAC CATHETERIZATION  11/2015   • CORONARY ANGIOPLASTY WITH STENT PLACEMENT  2015   • RHINOPLASTY  1975   • VASECTOMY         Social History     Socioeconomic History   • Marital status:      Spouse name: Not on file   • Number of children: Not on file   • Years of education: Not on file   • Highest education level: Not on file   Occupational History     Employer: [employer]   Tobacco Use   • Smoking status: Current Every Day Smoker     Packs/day: 0.50   • Smokeless tobacco: Current User   Substance and Sexual Activity   • Alcohol use: Yes     Comment: Seldom/ Caffeine use 10 cups daily   • Drug use: No   • Sexual activity: Defer       Family History   Problem Relation Age of Onset   • Diabetes Mother    • Hypertension Mother    • Pancreatic cancer Mother    • Cancer Mother    • Esophageal cancer Father    • Cancer Father    • Esophageal cancer Brother    • Cancer Brother    • Cancer Paternal Grandmother    • Diabetes Paternal Grandmother    • Alcohol abuse Brother    • Cancer Brother        The following portion of the patient's history were reviewed and updated as appropriate: past medical history, past surgical history, past social history, past family history, allergies, current medications, and problem list.    Review of Systems   Constitution:  Negative for chills, diaphoresis, fever, malaise/fatigue, night sweats, weight gain and weight loss.   HENT: Negative for hearing loss, nosebleeds, sore throat and tinnitus.    Eyes: Negative for blurred vision, double vision, pain and visual disturbance.   Cardiovascular: Positive for claudication. Negative for chest pain, cyanosis, dyspnea on exertion, irregular heartbeat, leg swelling, near-syncope, orthopnea, palpitations, paroxysmal nocturnal dyspnea and syncope.   Respiratory: Negative for cough, hemoptysis, shortness of breath, snoring and wheezing.    Endocrine: Negative for cold intolerance, heat intolerance and polyuria.   Hematologic/Lymphatic: Negative for bleeding problem. Does not bruise/bleed easily.   Skin: Negative for color change, dry skin, flushing and itching.   Musculoskeletal: Negative for falls, joint pain, joint swelling, muscle cramps, muscle weakness and myalgias.   Gastrointestinal: Negative for abdominal pain, constipation, heartburn, melena, nausea and vomiting.   Genitourinary: Negative for dysuria and hematuria.   Neurological: Negative for excessive daytime sleepiness, dizziness, light-headedness, loss of balance, numbness, paresthesias, seizures and vertigo.   Psychiatric/Behavioral: Negative for altered mental status, depression, memory loss and substance abuse. The patient does not have insomnia and is not nervous/anxious.    Allergic/Immunologic: Negative for environmental allergies.       Allergies   Allergen Reactions   • No Known Drug Allergy          Current Outpatient Medications:   •  lisinopril (PRINIVIL,ZESTRIL) 5 MG tablet, Take 1 tablet by mouth Daily., Disp: 90 tablet, Rfl: 3  •  metFORMIN (GLUCOPHAGE) 500 MG tablet, Take 2 tablets by mouth 2 (Two) Times a Day With Meals., Disp: 360 tablet, Rfl: 3  •  aspirin 81 MG tablet, Take 1 tablet by mouth Daily., Disp: 30 tablet, Rfl: 0  •  pravastatin (PRAVACHOL) 20 MG tablet, Take 1 tablet by mouth Daily., Disp: 30 tablet,  "Rfl: 11        Objective:     Vitals:    01/16/20 1017 01/16/20 1021   BP: 144/80 150/90   BP Location: Left arm Right arm   Pulse: 77    Weight: 95.7 kg (211 lb)    Height: 182.9 cm (72\")      Body mass index is 28.62 kg/m².    PHYSICAL EXAM:    Vitals Reviewed.   General Appearance: No acute distress, well developed and well nourished.    Eyes: Conjunctiva and lids: No erythema, swelling, or discharge. Sclera non-icteric.  Wears glasses.  HENT: Atraumatic, normocephalic. External eyes, ears, and nose normal. No hearing loss noted. Mucous membranes normal. Lips not cyanotic. Neck supple with no tenderness.  Respiratory: No signs of respiratory distress. Respiration rhythm and depth normal.   Clear to auscultation. No rales, crackles, rhonchi, or wheezing auscultated.   Cardiovascular:  Jugular Venous Pressure: Normal  Heart Rate and Rhythm: Normal, Heart Sounds: Normal S1 and S2. No S3 or S4 noted.  Murmurs: No murmurs noted. No rubs, thrills, or gallops.   Lower Extremities: No edema noted.  Gastrointestinal:  Abdomen soft, non-distended, non-tender. Normal bowel sounds. No hepatomegaly.   Musculoskeletal: Normal movement of extremities  Skin and Nails: General appearance normal. No pallor, cyanosis, diaphoresis. Skin temperature normal. No clubbing of fingernails.   Psychiatric: Patient alert and oriented to person, place, and time. Speech and behavior appropriate. Normal mood and affect.       ECG 12 Lead  Date/Time: 1/16/2020 10:17 AM  Performed by: Sabrina Gonsalez APRN  Authorized by: Sabrina Gonsalez APRN   Comparison: compared with previous ECG from 5/1/2018  Similar to previous ECG  Rhythm: sinus rhythm  Rate: normal  BPM: 77  Conduction: conduction normal  ST Segments: ST segments normal  T Waves: T waves normal  QRS axis: normal  Other: no other findings    Clinical impression: normal ECG              Assessment:       Diagnosis Plan   1. Encounter for examination required by Department of " Transportation (DOT)  Stress Test With Myocardial Perfusion One Day   2. Atherosclerosis of native coronary artery of native heart without angina pectoris  Stress Test With Myocardial Perfusion One Day   3. Essential hypertension     4. Mixed hyperlipidemia     5. Cigarette smoker     6. PAD (peripheral artery disease) (CMS/[facility])     7. Abdominal aortic aneurysm (AAA) without rupture (CMS/[facility])            Plan:       1.  Encounter for DOT Examination: Dr. 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  He will be due for a stress test per DOT requirements in May 2020.  It was finally settled that he would have a stress test in June 2020 and we will reevaluate.    2.  Coronary Artery Disease: Status post stent to the mid RCA and angioplasty to OM1.  He has stopped taking his aspirin I recommended that he restart 81 mg daily.  He was intolerant to atorvastatin and Dr. Peterson recommended pravastatin 20 mg daily.  He will continue with lisinopril and will hold off on his metoprolol tartrate at this time.  He denies anginal symptoms.    3.  Hypertension: Blood pressure elevated today.  Goal blood pressure less than 120/80.  He needs to follow a low-sodium diet and if it remains elevated either restart the metoprolol or increase lisinopril.    4.  Hyperlipidemia: Start pravastatin 20 mg daily.  Repeat lipid panel and liver enzymes in 6 weeks.    5.  Cigarette Smoker: Currently smoking half pack of cigarettes daily and smoking cessation was advised.    6/7.  Peripheral Arterial Disease and Abdominal Aortic Aneurysm: We appreciate Dr. Herbert Huynh following his vascular disease.    8.  He will have a stress test in June 2020 and follow-up with Dr. Peterson in approximately 1 year.    As always, it has been a pleasure to participate in your patient's care. Thank you.       Sincerely,         BELKIS Schmitz  dictation

## 2020-01-28 ENCOUNTER — TELEPHONE (OUTPATIENT)
Dept: FAMILY MEDICINE CLINIC | Facility: CLINIC | Age: 60
End: 2020-01-28

## 2020-01-28 DIAGNOSIS — E11.59 TYPE 2 DIABETES MELLITUS WITH OTHER CIRCULATORY COMPLICATION, WITHOUT LONG-TERM CURRENT USE OF INSULIN (HCC): Primary | ICD-10-CM

## 2020-01-29 ENCOUNTER — RESULTS ENCOUNTER (OUTPATIENT)
Dept: FAMILY MEDICINE CLINIC | Facility: CLINIC | Age: 60
End: 2020-01-29

## 2020-01-29 DIAGNOSIS — E11.59 TYPE 2 DIABETES MELLITUS WITH OTHER CIRCULATORY COMPLICATION, WITHOUT LONG-TERM CURRENT USE OF INSULIN (HCC): ICD-10-CM

## 2020-01-30 LAB — HBA1C MFR BLD: 7.7 % (ref 4.8–5.6)

## 2020-03-02 ENCOUNTER — OFFICE VISIT (OUTPATIENT)
Dept: FAMILY MEDICINE CLINIC | Facility: CLINIC | Age: 60
End: 2020-03-02

## 2020-03-02 VITALS
HEART RATE: 98 BPM | OXYGEN SATURATION: 98 % | TEMPERATURE: 98.5 F | WEIGHT: 211.4 LBS | DIASTOLIC BLOOD PRESSURE: 80 MMHG | HEIGHT: 72 IN | SYSTOLIC BLOOD PRESSURE: 110 MMHG | BODY MASS INDEX: 28.63 KG/M2 | RESPIRATION RATE: 18 BRPM

## 2020-03-02 DIAGNOSIS — F17.210 CIGARETTE SMOKER: ICD-10-CM

## 2020-03-02 DIAGNOSIS — H66.90 ACUTE OTITIS MEDIA, UNSPECIFIED OTITIS MEDIA TYPE: Primary | ICD-10-CM

## 2020-03-02 DIAGNOSIS — E11.59 TYPE 2 DIABETES MELLITUS WITH OTHER CIRCULATORY COMPLICATION, WITHOUT LONG-TERM CURRENT USE OF INSULIN (HCC): ICD-10-CM

## 2020-03-02 DIAGNOSIS — J40 BRONCHITIS: ICD-10-CM

## 2020-03-02 PROBLEM — H52.223 REGULAR ASTIGMATISM, BILATERAL: Status: ACTIVE | Noted: 2020-03-02

## 2020-03-02 PROBLEM — H52.00 HYPERMETROPIA: Status: ACTIVE | Noted: 2020-03-02

## 2020-03-02 PROBLEM — H25.13 AGE-RELATED NUCLEAR CATARACT, BILATERAL: Status: ACTIVE | Noted: 2020-03-02

## 2020-03-02 PROBLEM — H52.4 PRESBYOPIA: Status: ACTIVE | Noted: 2020-03-02

## 2020-03-02 PROCEDURE — 99213 OFFICE O/P EST LOW 20 MIN: CPT | Performed by: FAMILY MEDICINE

## 2020-03-02 RX ORDER — METOPROLOL TARTRATE 50 MG/1
TABLET, FILM COATED ORAL
COMMUNITY
End: 2020-03-02

## 2020-03-02 RX ORDER — AMOXICILLIN AND CLAVULANATE POTASSIUM 875; 125 MG/1; MG/1
1 TABLET, FILM COATED ORAL 2 TIMES DAILY
Qty: 20 TABLET | Refills: 0 | Status: SHIPPED | OUTPATIENT
Start: 2020-03-02 | End: 2020-10-01

## 2020-03-02 RX ORDER — CILOSTAZOL 100 MG/1
TABLET ORAL
COMMUNITY
End: 2020-03-02

## 2020-03-02 NOTE — PROGRESS NOTES
HPI  Sylvain Amador is a 59 y.o. male who is here for ear pain.  Patient has known coronary artery disease and other vascular disease problems.  Also had left eyelid infection which sounds like a stye which resolved.  Also noted nodule in scrotum.  Unfortunately according to recent cardiology visit continues to smoke.      Review of Systems   HENT: Positive for congestion, rhinorrhea and sore throat.    Eyes: Positive for pain and redness.   Neurological: Positive for headaches.   All other systems reviewed and are negative.        Past Medical History:   Diagnosis Date   • CAD (coronary artery disease)    • Diabetes mellitus (CMS/HCC)    • GERD (gastroesophageal reflux disease)    • Heart attack (CMS/HCC)    • Hyperlipidemia    • Hypertension    • Intermittent claudication (CMS/HCC)    • PAD (peripheral artery disease) (CMS/HCC)     of lower extremities   • Recovering alcoholic (CMS/HCC)        Past Surgical History:   Procedure Laterality Date   • ARTERIOGRAM AORTIC Left 8/2/2018    Procedure: AORTIC AND LEFT ILIAC ARTERY ANEURYSM REPAIR;  Surgeon: Arturo Huynh MD;  Location: [facility];  Service: Vascular   • CARDIAC CATHETERIZATION  11/2015   • CORONARY ANGIOPLASTY WITH STENT PLACEMENT  2015   • RHINOPLASTY  1975   • VASECTOMY         Family History   Problem Relation Age of Onset   • Diabetes Mother    • Hypertension Mother    • Pancreatic cancer Mother    • Cancer Mother    • Esophageal cancer Father    • Cancer Father    • Esophageal cancer Brother    • Cancer Brother    • Cancer Paternal Grandmother    • Diabetes Paternal Grandmother    • Alcohol abuse Brother    • Cancer Brother        Social History     Socioeconomic History   • Marital status:      Spouse name: Not on file   • Number of children: Not on file   • Years of education: Not on file   • Highest education level: Not on file   Occupational History     Employer: [employer]   Tobacco Use   • Smoking  status: Current Every Day Smoker     Packs/day: 0.50   • Smokeless tobacco: Current User   Substance and Sexual Activity   • Alcohol use: Yes     Comment: Seldom/ Caffeine use 10 cups daily   • Drug use: No   • Sexual activity: Defer         Physical Exam   Constitutional: He is oriented to person, place, and time. He appears well-developed and well-nourished. No distress.   HENT:   Head: Normocephalic.   Right Ear: Ear canal normal. Tympanic membrane mobility is abnormal.   Left Ear: Ear canal normal. Tympanic membrane is injected and erythematous. Tympanic membrane mobility is abnormal.   Nose: Nose normal.   Mouth/Throat: Oropharynx is clear and moist.   Eyes: Pupils are equal, round, and reactive to light. Conjunctivae and EOM are normal.   Neck: Normal range of motion. Neck supple.   Cardiovascular: Normal rate, regular rhythm and normal heart sounds.   Pulmonary/Chest: Effort normal. No respiratory distress.   Abdominal: Soft. He exhibits no distension. There is no tenderness.   Musculoskeletal: Normal range of motion. He exhibits no edema or deformity.   Neurological: He is alert and oriented to person, place, and time. Coordination normal.   Skin: Skin is warm and dry.   Psychiatric: He has a normal mood and affect. His behavior is normal. Judgment and thought content normal.   Nursing note and vitals reviewed.        Assessment/Plan    Sylvain was seen today for earache.    Diagnoses and all orders for this visit:    Acute otitis media, unspecified otitis media type  -     amoxicillin-clavulanate (AUGMENTIN) 875-125 MG per tablet; Take 1 tablet by mouth 2 (Two) Times a Day.    Cigarette smoker    Type 2 diabetes mellitus with other circulatory complication, without long-term current use of insulin (CMS/[facility])    Bronchitis      Patient here mostly complaining of left ear pain and headache.  Also has a persistent bronchitis and says now coughing up greenish sputum.  Discussed treatment options but especially in  view of apparent acute left ear infection will give Augmentin and recommend resuming Flonase.  After patient left noted had recommended follow-up appointment for diabetes.  Strongly recommend discontinuing cigarette use especially in view of known vascular disease and diabetes.  Patient worked in today for ear pain, will contact to make routine follow-up appointment for other medical issues as noted.

## 2020-06-30 ENCOUNTER — TELEPHONE (OUTPATIENT)
Dept: CARDIOLOGY | Facility: CLINIC | Age: 60
End: 2020-06-30

## 2020-06-30 DIAGNOSIS — E78.2 MIXED HYPERLIPIDEMIA: Primary | ICD-10-CM

## 2020-06-30 NOTE — TELEPHONE ENCOUNTER
Please call patient. He is due for fasting lipid panel and blood work. Orders placed to be done in the next month at the main hospital. Thanks.

## 2020-06-30 NOTE — TELEPHONE ENCOUNTER
----- Message from BELKIS Horton sent at 1/16/2020 11:17 AM EST -----    Needs fasting lipid panel AST and ALT

## 2020-08-25 DIAGNOSIS — I25.10 ATHEROSCLEROSIS OF NATIVE CORONARY ARTERY OF NATIVE HEART WITHOUT ANGINA PECTORIS: ICD-10-CM

## 2020-08-25 DIAGNOSIS — I10 ESSENTIAL HYPERTENSION: ICD-10-CM

## 2020-08-25 DIAGNOSIS — Z86.39 HISTORY OF UNCONTROLLED DIABETES: ICD-10-CM

## 2020-08-25 RX ORDER — LISINOPRIL 5 MG/1
TABLET ORAL
Qty: 90 TABLET | Refills: 3 | Status: SHIPPED | OUTPATIENT
Start: 2020-08-25 | End: 2020-10-01 | Stop reason: SDUPTHER

## 2020-09-30 DIAGNOSIS — E11.59 TYPE 2 DIABETES MELLITUS WITH OTHER CIRCULATORY COMPLICATION, WITHOUT LONG-TERM CURRENT USE OF INSULIN (HCC): ICD-10-CM

## 2020-10-01 ENCOUNTER — TELEPHONE (OUTPATIENT)
Dept: FAMILY MEDICINE CLINIC | Facility: CLINIC | Age: 60
End: 2020-10-01

## 2020-10-01 DIAGNOSIS — I25.10 ATHEROSCLEROSIS OF NATIVE CORONARY ARTERY OF NATIVE HEART WITHOUT ANGINA PECTORIS: ICD-10-CM

## 2020-10-01 DIAGNOSIS — E11.59 TYPE 2 DIABETES MELLITUS WITH OTHER CIRCULATORY COMPLICATION, WITHOUT LONG-TERM CURRENT USE OF INSULIN: ICD-10-CM

## 2020-10-01 DIAGNOSIS — Z11.59 ENCOUNTER FOR HEPATITIS C SCREENING TEST FOR LOW RISK PATIENT: ICD-10-CM

## 2020-10-01 DIAGNOSIS — I10 ESSENTIAL HYPERTENSION: ICD-10-CM

## 2020-10-01 DIAGNOSIS — Z79.899 HIGH RISK MEDICATION USE: ICD-10-CM

## 2020-10-01 DIAGNOSIS — Z86.39 HISTORY OF UNCONTROLLED DIABETES: ICD-10-CM

## 2020-10-01 DIAGNOSIS — E78.2 MIXED HYPERLIPIDEMIA: Primary | ICD-10-CM

## 2020-10-01 DIAGNOSIS — E11.59 TYPE 2 DIABETES MELLITUS WITH OTHER CIRCULATORY COMPLICATION, WITHOUT LONG-TERM CURRENT USE OF INSULIN (HCC): ICD-10-CM

## 2020-10-01 DIAGNOSIS — Z12.5 PROSTATE CANCER SCREENING: ICD-10-CM

## 2020-10-01 RX ORDER — LISINOPRIL 5 MG/1
5 TABLET ORAL DAILY
Qty: 10 TABLET | Refills: 0 | Status: CANCELLED | OUTPATIENT
Start: 2020-10-01

## 2020-10-01 RX ORDER — LISINOPRIL 5 MG/1
5 TABLET ORAL DAILY
Qty: 90 TABLET | Refills: 3 | Status: SHIPPED | OUTPATIENT
Start: 2020-10-01 | End: 2020-11-24 | Stop reason: HOSPADM

## 2020-10-01 NOTE — TELEPHONE ENCOUNTER
Patient called in and stated he needs a refill of lisinopril (PRINIVIL,ZESTRIL) 5 MG tablet and    metFORMIN (GLUCOPHAGE) 500 MG tablet          Sent to [facility]/pharmacy #[id number] - [place], [place] - [address] AT [address] [facility] - [phone]  - [phone]   [phone]      Patient call back [phone]

## 2020-10-02 ENCOUNTER — RESULTS ENCOUNTER (OUTPATIENT)
Dept: FAMILY MEDICINE CLINIC | Facility: CLINIC | Age: 60
End: 2020-10-02

## 2020-10-02 DIAGNOSIS — E78.2 MIXED HYPERLIPIDEMIA: ICD-10-CM

## 2020-10-02 DIAGNOSIS — Z11.59 ENCOUNTER FOR HEPATITIS C SCREENING TEST FOR LOW RISK PATIENT: ICD-10-CM

## 2020-10-02 DIAGNOSIS — E11.59 TYPE 2 DIABETES MELLITUS WITH OTHER CIRCULATORY COMPLICATION, WITHOUT LONG-TERM CURRENT USE OF INSULIN (HCC): ICD-10-CM

## 2020-10-02 DIAGNOSIS — Z12.5 PROSTATE CANCER SCREENING: ICD-10-CM

## 2020-10-02 DIAGNOSIS — Z79.899 HIGH RISK MEDICATION USE: ICD-10-CM

## 2020-10-02 DIAGNOSIS — I10 ESSENTIAL HYPERTENSION: ICD-10-CM

## 2020-10-05 ENCOUNTER — OFFICE VISIT (OUTPATIENT)
Dept: FAMILY MEDICINE CLINIC | Facility: CLINIC | Age: 60
End: 2020-10-05

## 2020-10-05 VITALS
WEIGHT: 210.8 LBS | HEIGHT: 72 IN | HEART RATE: 89 BPM | OXYGEN SATURATION: 98 % | TEMPERATURE: 97.4 F | SYSTOLIC BLOOD PRESSURE: 140 MMHG | DIASTOLIC BLOOD PRESSURE: 90 MMHG | BODY MASS INDEX: 28.55 KG/M2 | RESPIRATION RATE: 20 BRPM

## 2020-10-05 DIAGNOSIS — I10 ESSENTIAL HYPERTENSION: ICD-10-CM

## 2020-10-05 DIAGNOSIS — E11.59 TYPE 2 DIABETES MELLITUS WITH OTHER CIRCULATORY COMPLICATION, WITHOUT LONG-TERM CURRENT USE OF INSULIN (HCC): ICD-10-CM

## 2020-10-05 DIAGNOSIS — I25.10 ATHEROSCLEROSIS OF NATIVE CORONARY ARTERY OF NATIVE HEART WITHOUT ANGINA PECTORIS: ICD-10-CM

## 2020-10-05 DIAGNOSIS — E78.2 MIXED HYPERLIPIDEMIA: ICD-10-CM

## 2020-10-05 DIAGNOSIS — F17.210 CIGARETTE SMOKER: Primary | ICD-10-CM

## 2020-10-05 DIAGNOSIS — I71.40 ABDOMINAL AORTIC ANEURYSM (AAA) WITHOUT RUPTURE (HCC): ICD-10-CM

## 2020-10-05 LAB — HBA1C MFR BLD: 7 %

## 2020-10-05 PROCEDURE — 83036 HEMOGLOBIN GLYCOSYLATED A1C: CPT | Performed by: FAMILY MEDICINE

## 2020-10-05 PROCEDURE — 99214 OFFICE O/P EST MOD 30 MIN: CPT | Performed by: FAMILY MEDICINE

## 2020-10-05 RX ORDER — ROSUVASTATIN CALCIUM 10 MG/1
10 TABLET, COATED ORAL DAILY
Qty: 90 TABLET | Refills: 3 | Status: SHIPPED | OUTPATIENT
Start: 2020-10-05 | End: 2020-11-24 | Stop reason: HOSPADM

## 2020-10-05 NOTE — PROGRESS NOTES
HPI  Sylvain Amador is a 60 y.o. male who is here for follow up of diabetes hypertension hyperlipidemia.  Apparently is scheduled for some type of stress test in the near future.  Lab work done last Friday and needs A1c for DOT physical.  Unfortunately for some reason test was not done but is checked in the office today and is 7.0.  Recommend continue current therapy including metformin 1000 mg twice daily.  Patient admits prior to previous lab work was not medication consistently.  Unfortunately patient continues to smoke which is strongly discouraged especially with his diabetes hyperlipidemia and known coronary artery disease.  Obviously this makes him extremely high risk of vascular events.  Has had previous vascular surgical intervention also.  Also coronary artery interventions.      Review of Systems   Constitutional: Negative for unexpected weight change.   All other systems reviewed and are negative.        Past Medical History:   Diagnosis Date   • CAD (coronary artery disease)    • Diabetes mellitus (CMS/HCC)    • GERD (gastroesophageal reflux disease)    • Heart attack (CMS/HCC)    • Hyperlipidemia    • Hypertension    • Intermittent claudication (CMS/HCC)    • PAD (peripheral artery disease) (CMS/HCC)     of lower extremities   • Recovering alcoholic (CMS/HCC)        Past Surgical History:   Procedure Laterality Date   • ARTERIOGRAM AORTIC Left 8/2/2018    Procedure: AORTIC AND LEFT ILIAC ARTERY ANEURYSM REPAIR;  Surgeon: Arturo Huynh MD;  Location: [facility] OR;  Service: Vascular   • CARDIAC CATHETERIZATION  11/2015   • CORONARY ANGIOPLASTY WITH STENT PLACEMENT  2015   • RHINOPLASTY  1975   • VASECTOMY         Family History   Problem Relation Age of Onset   • Diabetes Mother    • Hypertension Mother    • Pancreatic cancer Mother    • Cancer Mother    • Esophageal cancer Father    • Cancer Father    • Esophageal cancer Brother    • Cancer Brother    • Cancer Paternal Grandmother    •  Diabetes Paternal Grandmother    • Alcohol abuse Brother    • Cancer Brother        Social History     Socioeconomic History   • Marital status:      Spouse name: Not on file   • Number of children: Not on file   • Years of education: Not on file   • Highest education level: Not on file   Occupational History     Employer: [employer]   Tobacco Use   • Smoking status: Current Every Day Smoker     Packs/day: 0.50   • Smokeless tobacco: Current User   Substance and Sexual Activity   • Alcohol use: Yes     Comment: Seldom/ Caffeine use 10 cups daily   • Drug use: No   • Sexual activity: Defer       Vitals:    10/05/20 1045   BP: 140/90   Pulse: 89   Resp: 20   Temp: 97.4 °F (36.3 °C)   SpO2: 98%        Body mass index is 28.59 kg/m².      Physical Exam  Vitals signs and nursing note reviewed.   Constitutional:       Appearance: He is well-developed. He is obese. He is not ill-appearing.   HENT:      Head: Normocephalic and atraumatic.   Eyes:      Conjunctiva/sclera: Conjunctivae normal.      Pupils: Pupils are equal, round, and reactive to light.   Neck:      Musculoskeletal: Normal range of motion.      Thyroid: No thyromegaly.   Cardiovascular:      Rate and Rhythm: Normal rate and regular rhythm.   Pulmonary:      Effort: Pulmonary effort is normal. No respiratory distress.      Breath sounds: Normal breath sounds.   Abdominal:      General: There is no distension.      Palpations: Abdomen is soft. There is no mass.      Tenderness: There is no abdominal tenderness.      Hernia: No hernia is present.      Comments: Protuberant abdomen   Musculoskeletal: Normal range of motion.         General: No tenderness or deformity.   Lymphadenopathy:      Cervical: No cervical adenopathy.   Skin:     General: Skin is warm and dry.      Coloration: Skin is not pale.      Findings: No rash.   Neurological:      Mental Status: He is alert and oriented to person, place, and time.      Motor: No abnormal  muscle tone.      Coordination: Coordination normal.   Psychiatric:         Mood and Affect: Mood normal.         Behavior: Behavior normal.         Thought Content: Thought content normal.         Judgment: Judgment normal.           Assessment/Plan    Sylvain was seen today for hypertension.    Diagnoses and all orders for this visit:    Cigarette smoker  -     Ambulatory Referral to Multi-Disciplinary Clinic    Type 2 diabetes mellitus with other circulatory complication, without long-term current use of insulin (CMS/[facility])  -     POC Glycosylated Hemoglobin (Hb A1C)  -     rosuvastatin (Crestor) 10 MG tablet; Take 1 tablet by mouth Daily.    Mixed hyperlipidemia  -     rosuvastatin (Crestor) 10 MG tablet; Take 1 tablet by mouth Daily.    Essential hypertension    Atherosclerosis of native coronary artery of native heart without angina pectoris  -     rosuvastatin (Crestor) 10 MG tablet; Take 1 tablet by mouth Daily.    Abdominal aortic aneurysm (AAA) without rupture (CMS/[facility])  -     rosuvastatin (Crestor) 10 MG tablet; Take 1 tablet by mouth Daily.        Patient here especially for follow-up of diabetes.  Some difficulty getting lab results which were drawn last Friday?  Eventually able to get results directly from [facility] but A1c apparently not done?  A1c in the office shows much improvement and recommend continuing metformin 1000 mg twice daily.  Results necessary for DOT.  Patient apparently is scheduled for some type of stress testing by cardiologist in the near future.  Again strongly recommend discontinuing cigarette use and statin therapy.  Patient reports GI symptoms with atorvastatin?  Other routine health screening recommended including lung cancer screening and given information for multi disciplinary clinic referral?.  Also colonoscopy and other health screening recommendations.  Especially being a  needs to be getting yearly eye exams.    This note includes information entered using a voice  recognition dictation system.  Though reviewed, some nonsensible errors may remain.

## 2020-10-06 ENCOUNTER — HOSPITAL ENCOUNTER (OUTPATIENT)
Dept: CARDIOLOGY | Facility: HOSPITAL | Age: 60
Discharge: HOME | End: 2020-10-06
Admitting: NURSE PRACTITIONER

## 2020-10-06 VITALS — HEIGHT: 72 IN | BODY MASS INDEX: 28.44 KG/M2 | WEIGHT: 210 LBS

## 2020-10-06 DIAGNOSIS — E78.2 MIXED HYPERLIPIDEMIA: ICD-10-CM

## 2020-10-06 DIAGNOSIS — Z79.899 HIGH RISK MEDICATION USE: ICD-10-CM

## 2020-10-06 DIAGNOSIS — Z02.89 ENCOUNTER FOR EXAMINATION REQUIRED BY DEPARTMENT OF TRANSPORTATION (DOT): ICD-10-CM

## 2020-10-06 DIAGNOSIS — E11.59 TYPE 2 DIABETES MELLITUS WITH OTHER CIRCULATORY COMPLICATION, WITHOUT LONG-TERM CURRENT USE OF INSULIN (HCC): Primary | ICD-10-CM

## 2020-10-06 DIAGNOSIS — I25.10 ATHEROSCLEROSIS OF NATIVE CORONARY ARTERY OF NATIVE HEART WITHOUT ANGINA PECTORIS: ICD-10-CM

## 2020-10-06 LAB
ALBUMIN SERPL-MCNC: 4.7 G/DL (ref 3.5–5.2)
ALBUMIN/CREAT UR: 175 MG/G CREAT (ref 0–29)
ALBUMIN/GLOB SERPL: 2 G/DL
ALP SERPL-CCNC: 66 U/L (ref 39–117)
ALT SERPL-CCNC: 14 U/L (ref 1–41)
AST SERPL-CCNC: 11 U/L (ref 1–40)
BASOPHILS # BLD AUTO: 0.06 10*3/MM3 (ref 0–0.2)
BASOPHILS NFR BLD AUTO: 0.7 % (ref 0–1.5)
BH CV NUCLEAR PRIOR STUDY: 3
BH CV STRESS BP STAGE 1: NORMAL
BH CV STRESS COMMENTS STAGE 1: NORMAL
BH CV STRESS DOSE REGADENOSON STAGE 1: 0.4
BH CV STRESS DURATION MIN STAGE 1: 0
BH CV STRESS DURATION SEC STAGE 1: 10
BH CV STRESS HR STAGE 1: 94
BH CV STRESS PROTOCOL 1: NORMAL
BH CV STRESS RECOVERY BP: NORMAL MMHG
BH CV STRESS RECOVERY HR: 83 BPM
BH CV STRESS STAGE 1: 1
BILIRUB SERPL-MCNC: 0.2 MG/DL (ref 0–1.2)
BUN SERPL-MCNC: 20 MG/DL (ref 8–23)
BUN/CREAT SERPL: 14.7 (ref 7–25)
CALCIUM SERPL-MCNC: 9.7 MG/DL (ref 8.6–10.5)
CHLORIDE SERPL-SCNC: 100 MMOL/L (ref 98–107)
CHOLEST SERPL-MCNC: 280 MG/DL (ref 0–200)
CO2 SERPL-SCNC: 24.6 MMOL/L (ref 22–29)
CREAT SERPL-MCNC: 1.36 MG/DL (ref 0.76–1.27)
CREAT UR-MCNC: 42.8 MG/DL
EOSINOPHIL # BLD AUTO: 0.09 10*3/MM3 (ref 0–0.4)
EOSINOPHIL NFR BLD AUTO: 1 % (ref 0.3–6.2)
ERYTHROCYTE [DISTWIDTH] IN BLOOD BY AUTOMATED COUNT: 13.7 % (ref 12.3–15.4)
GLOBULIN SER CALC-MCNC: 2.4 GM/DL
GLUCOSE SERPL-MCNC: 226 MG/DL (ref 65–99)
HCT VFR BLD AUTO: 46 % (ref 37.5–51)
HCV AB S/CO SERPL IA: <0.1 S/CO RATIO (ref 0–0.9)
HDLC SERPL-MCNC: 29 MG/DL (ref 40–60)
HGB BLD-MCNC: 15.3 G/DL (ref 13–17.7)
IMM GRANULOCYTES # BLD AUTO: 0.04 10*3/MM3 (ref 0–0.05)
IMM GRANULOCYTES NFR BLD AUTO: 0.5 % (ref 0–0.5)
LDLC SERPL CALC-MCNC: ABNORMAL MG/DL
LV EF NUC BP: 57 %
LYMPHOCYTES # BLD AUTO: 2.3 10*3/MM3 (ref 0.7–3.1)
LYMPHOCYTES NFR BLD AUTO: 26.3 % (ref 19.6–45.3)
MAXIMAL PREDICTED HEART RATE: 158 BPM
MCH RBC QN AUTO: 30.2 PG (ref 26.6–33)
MCHC RBC AUTO-ENTMCNC: 33.3 G/DL (ref 31.5–35.7)
MCV RBC AUTO: 90.9 FL (ref 79–97)
MICROALBUMIN UR-MCNC: 75.1 UG/ML
MONOCYTES # BLD AUTO: 0.6 10*3/MM3 (ref 0.1–0.9)
MONOCYTES NFR BLD AUTO: 6.9 % (ref 5–12)
NEUTROPHILS # BLD AUTO: 5.64 10*3/MM3 (ref 1.7–7)
NEUTROPHILS NFR BLD AUTO: 64.6 % (ref 42.7–76)
NRBC BLD AUTO-RTO: 0 /100 WBC (ref 0–0.2)
PERCENT MAX PREDICTED HR: 59.49 %
PLATELET # BLD AUTO: 237 10*3/MM3 (ref 140–450)
POTASSIUM SERPL-SCNC: 5.1 MMOL/L (ref 3.5–5.2)
PROT SERPL-MCNC: 7.1 G/DL (ref 6–8.5)
PSA SERPL-MCNC: 1.04 NG/ML (ref 0–4)
RBC # BLD AUTO: 5.06 10*6/MM3 (ref 4.14–5.8)
SODIUM SERPL-SCNC: 135 MMOL/L (ref 136–145)
STRESS BASELINE BP: NORMAL MMHG
STRESS BASELINE HR: 69 BPM
STRESS PERCENT HR: 70 %
STRESS POST EXERCISE DUR SEC: 10 SEC
STRESS POST PEAK BP: NORMAL MMHG
STRESS POST PEAK HR: 94 BPM
STRESS TARGET HR: 134 BPM
TRIGL SERPL-MCNC: 428 MG/DL (ref 0–150)
VLDLC SERPL CALC-MCNC: ABNORMAL MG/DL
WBC # BLD AUTO: 8.73 10*3/MM3 (ref 3.4–10.8)

## 2020-10-06 PROCEDURE — 78452 HT MUSCLE IMAGE SPECT MULT: CPT | Performed by: INTERNAL MEDICINE

## 2020-10-06 PROCEDURE — 93016 CV STRESS TEST SUPVJ ONLY: CPT | Performed by: INTERNAL MEDICINE

## 2020-10-06 PROCEDURE — 93018 CV STRESS TEST I&R ONLY: CPT | Performed by: INTERNAL MEDICINE

## 2020-10-06 PROCEDURE — 78452 HT MUSCLE IMAGE SPECT MULT: CPT

## 2020-10-06 PROCEDURE — 25010000002 REGADENOSON 0.4 MG/5ML SOLUTION: Performed by: NURSE PRACTITIONER

## 2020-10-06 PROCEDURE — 93017 CV STRESS TEST TRACING ONLY: CPT

## 2020-10-06 PROCEDURE — A9502 TC99M TETROFOSMIN: HCPCS | Performed by: NURSE PRACTITIONER

## 2020-10-06 PROCEDURE — 0 TECHNETIUM TETROFOSMIN KIT: Performed by: NURSE PRACTITIONER

## 2020-10-06 RX ADMIN — TETROFOSMIN 1 DOSE: 1.38 INJECTION, POWDER, LYOPHILIZED, FOR SOLUTION INTRAVENOUS at 12:40

## 2020-10-06 RX ADMIN — REGADENOSON 0.4 MG: 0.08 INJECTION, SOLUTION INTRAVENOUS at 12:40

## 2020-10-06 RX ADMIN — TETROFOSMIN 1 DOSE: 1.38 INJECTION, POWDER, LYOPHILIZED, FOR SOLUTION INTRAVENOUS at 11:45

## 2020-10-07 ENCOUNTER — TELEPHONE (OUTPATIENT)
Dept: CARDIOLOGY | Facility: CLINIC | Age: 60
End: 2020-10-07

## 2020-10-07 NOTE — TELEPHONE ENCOUNTER
Result Text     · Left ventricular ejection fraction is normal. (Calculated EF = 57%).  · Myocardial perfusion imaging indicates a normal myocardial perfusion study with no evidence of ischemia.  · Impressions are consistent with a low risk study.  · Compared to the prior study from 5/4/2018 the current study reveals no changes.     Patient informed about test results.  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    Darien-he is scheduled to see Dr. Peterson in December and that time does not work for him.  Please reschedule him for December 2020 or January 2021.  Thank you

## 2020-10-27 ENCOUNTER — TELEPHONE (OUTPATIENT)
Dept: CARDIOLOGY | Facility: CLINIC | Age: 60
End: 2020-10-27

## 2020-10-27 NOTE — TELEPHONE ENCOUNTER
Gaby,   Do you mind mailing the clearance letter Sabrina created to pt's home mailing address, please? I have verified his address.  Thank you,  Darien

## 2020-11-22 ENCOUNTER — APPOINTMENT (OUTPATIENT)
Dept: CARDIOLOGY | Facility: HOSPITAL | Age: 60
End: 2020-11-22

## 2020-11-22 ENCOUNTER — APPOINTMENT (OUTPATIENT)
Dept: GENERAL RADIOLOGY | Facility: HOSPITAL | Age: 60
End: 2020-11-22

## 2020-11-22 ENCOUNTER — HOSPITAL ENCOUNTER (INPATIENT)
Facility: HOSPITAL | Age: 60
LOS: 2 days | Discharge: HOME | End: 2020-11-24
Attending: EMERGENCY MEDICINE | Admitting: INTERNAL MEDICINE

## 2020-11-22 DIAGNOSIS — I21.3 ST ELEVATION MYOCARDIAL INFARCTION (STEMI), UNSPECIFIED ARTERY (HCC): Primary | ICD-10-CM

## 2020-11-22 DIAGNOSIS — Z86.79 HISTORY OF CORONARY ARTERY DISEASE: ICD-10-CM

## 2020-11-22 DIAGNOSIS — I25.10 ATHEROSCLEROSIS OF NATIVE CORONARY ARTERY OF NATIVE HEART WITHOUT ANGINA PECTORIS: ICD-10-CM

## 2020-11-22 DIAGNOSIS — Z86.39 HISTORY OF DIABETES MELLITUS: ICD-10-CM

## 2020-11-22 DIAGNOSIS — Z86.79 HISTORY OF HYPERTENSION: ICD-10-CM

## 2020-11-22 DIAGNOSIS — R07.9 CHEST PAIN, UNSPECIFIED TYPE: ICD-10-CM

## 2020-11-22 LAB
ALBUMIN SERPL-MCNC: 4.1 G/DL (ref 3.5–5.2)
ALBUMIN/GLOB SERPL: 1.5 G/DL
ALP SERPL-CCNC: 59 U/L (ref 39–117)
ALT SERPL W P-5'-P-CCNC: 17 U/L (ref 1–41)
ANION GAP SERPL CALCULATED.3IONS-SCNC: 12.9 MMOL/L (ref 5–15)
AORTIC DIMENSIONLESS INDEX: 0.7 (DI)
APTT PPP: 27.5 SECONDS (ref 22.7–35.4)
AST SERPL-CCNC: 11 U/L (ref 1–40)
B PARAPERT DNA SPEC QL NAA+PROBE: NOT DETECTED
B PERT DNA SPEC QL NAA+PROBE: NOT DETECTED
BH CV ECHO MEAS - AO MAX PG (FULL): 1.5 MMHG
BH CV ECHO MEAS - AO MAX PG: 3.2 MMHG
BH CV ECHO MEAS - AO MEAN PG (FULL): 1 MMHG
BH CV ECHO MEAS - AO MEAN PG: 2 MMHG
BH CV ECHO MEAS - AO ROOT AREA (BSA CORRECTED): 1.8
BH CV ECHO MEAS - AO ROOT AREA: 11.9 CM^2
BH CV ECHO MEAS - AO ROOT DIAM: 3.9 CM
BH CV ECHO MEAS - AO V2 MAX: 89.3 CM/SEC
BH CV ECHO MEAS - AO V2 MEAN: 59.2 CM/SEC
BH CV ECHO MEAS - AO V2 VTI: 17.1 CM
BH CV ECHO MEAS - AVA(I,A): 3.4 CM^2
BH CV ECHO MEAS - AVA(I,D): 3.4 CM^2
BH CV ECHO MEAS - AVA(V,A): 3.6 CM^2
BH CV ECHO MEAS - AVA(V,D): 3.6 CM^2
BH CV ECHO MEAS - BSA(HAYCOCK): 2.2 M^2
BH CV ECHO MEAS - BSA: 2.2 M^2
BH CV ECHO MEAS - BZI_BMI: 29.2 KILOGRAMS/M^2
BH CV ECHO MEAS - BZI_METRIC_HEIGHT: 182.9 CM
BH CV ECHO MEAS - BZI_METRIC_WEIGHT: 97.5 KG
BH CV ECHO MEAS - EDV(CUBED): 110.6 ML
BH CV ECHO MEAS - EDV(MOD-SP2): 92 ML
BH CV ECHO MEAS - EDV(MOD-SP4): 98 ML
BH CV ECHO MEAS - EDV(TEICH): 107.5 ML
BH CV ECHO MEAS - EF(CUBED): 61.2 %
BH CV ECHO MEAS - EF(MOD-BP): 42.9 %
BH CV ECHO MEAS - EF(MOD-SP2): 37 %
BH CV ECHO MEAS - EF(MOD-SP4): 48 %
BH CV ECHO MEAS - EF(TEICH): 52.7 %
BH CV ECHO MEAS - ESV(CUBED): 42.9 ML
BH CV ECHO MEAS - ESV(MOD-SP2): 58 ML
BH CV ECHO MEAS - ESV(MOD-SP4): 51 ML
BH CV ECHO MEAS - ESV(TEICH): 50.9 ML
BH CV ECHO MEAS - FS: 27.1 %
BH CV ECHO MEAS - IVS/LVPW: 1.1
BH CV ECHO MEAS - IVSD: 1.2 CM
BH CV ECHO MEAS - LAT PEAK E' VEL: 8.3 CM/SEC
BH CV ECHO MEAS - LV DIASTOLIC VOL/BSA (35-75): 44.6 ML/M^2
BH CV ECHO MEAS - LV MASS(C)D: 206.4 GRAMS
BH CV ECHO MEAS - LV MASS(C)DI: 93.9 GRAMS/M^2
BH CV ECHO MEAS - LV MAX PG: 1.7 MMHG
BH CV ECHO MEAS - LV MEAN PG: 1 MMHG
BH CV ECHO MEAS - LV SYSTOLIC VOL/BSA (12-30): 23.2 ML/M^2
BH CV ECHO MEAS - LV V1 MAX: 65 CM/SEC
BH CV ECHO MEAS - LV V1 MEAN: 42.7 CM/SEC
BH CV ECHO MEAS - LV V1 VTI: 11.9 CM
BH CV ECHO MEAS - LVIDD: 4.8 CM
BH CV ECHO MEAS - LVIDS: 3.5 CM
BH CV ECHO MEAS - LVLD AP2: 7.1 CM
BH CV ECHO MEAS - LVLD AP4: 7.5 CM
BH CV ECHO MEAS - LVLS AP2: 6.7 CM
BH CV ECHO MEAS - LVLS AP4: 6.4 CM
BH CV ECHO MEAS - LVOT AREA (M): 4.9 CM^2
BH CV ECHO MEAS - LVOT AREA: 4.9 CM^2
BH CV ECHO MEAS - LVOT DIAM: 2.5 CM
BH CV ECHO MEAS - LVPWD: 1.1 CM
BH CV ECHO MEAS - MED PEAK E' VEL: 5 CM/SEC
BH CV ECHO MEAS - MV A MAX VEL: 41.2 CM/SEC
BH CV ECHO MEAS - MV DEC SLOPE: 356 CM/SEC^2
BH CV ECHO MEAS - MV DEC TIME: 129 SEC
BH CV ECHO MEAS - MV E MAX VEL: 58.2 CM/SEC
BH CV ECHO MEAS - MV E/A: 1.4
BH CV ECHO MEAS - MV MAX PG: 2 MMHG
BH CV ECHO MEAS - MV MEAN PG: 1 MMHG
BH CV ECHO MEAS - MV P1/2T MAX VEL: 71.2 CM/SEC
BH CV ECHO MEAS - MV P1/2T: 58.6 MSEC
BH CV ECHO MEAS - MV V2 MAX: 70.2 CM/SEC
BH CV ECHO MEAS - MV V2 MEAN: 39.6 CM/SEC
BH CV ECHO MEAS - MV V2 VTI: 17.5 CM
BH CV ECHO MEAS - MVA P1/2T LCG: 3.1 CM^2
BH CV ECHO MEAS - MVA(P1/2T): 3.8 CM^2
BH CV ECHO MEAS - MVA(VTI): 3.3 CM^2
BH CV ECHO MEAS - RAP SYSTOLE: 3 MMHG
BH CV ECHO MEAS - SI(AO): 93 ML/M^2
BH CV ECHO MEAS - SI(CUBED): 30.8 ML/M^2
BH CV ECHO MEAS - SI(LVOT): 26.6 ML/M^2
BH CV ECHO MEAS - SI(MOD-SP2): 15.5 ML/M^2
BH CV ECHO MEAS - SI(MOD-SP4): 21.4 ML/M^2
BH CV ECHO MEAS - SI(TEICH): 25.8 ML/M^2
BH CV ECHO MEAS - SV(AO): 204.3 ML
BH CV ECHO MEAS - SV(CUBED): 67.7 ML
BH CV ECHO MEAS - SV(LVOT): 58.4 ML
BH CV ECHO MEAS - SV(MOD-SP2): 34 ML
BH CV ECHO MEAS - SV(MOD-SP4): 47 ML
BH CV ECHO MEAS - SV(TEICH): 56.7 ML
BH CV ECHO MEAS - TAPSE (>1.6): 2.2 CM
BH CV ECHO MEASUREMENTS AVERAGE E/E' RATIO: 8.75
BH CV XLRA - RV BASE: 3.2 CM
BH CV XLRA - TDI S': 11.7 CM/SEC
BILIRUB SERPL-MCNC: 0.2 MG/DL (ref 0–1.2)
BUN SERPL-MCNC: 20 MG/DL (ref 8–23)
BUN/CREAT SERPL: 16.9 (ref 7–25)
C PNEUM DNA NPH QL NAA+NON-PROBE: NOT DETECTED
CALCIUM SPEC-SCNC: 9.1 MG/DL (ref 8.6–10.5)
CHLORIDE SERPL-SCNC: 103 MMOL/L (ref 98–107)
CO2 SERPL-SCNC: 18.1 MMOL/L (ref 22–29)
CREAT SERPL-MCNC: 1.18 MG/DL (ref 0.76–1.27)
DEPRECATED RDW RBC AUTO: 44.3 FL (ref 37–54)
EOSINOPHIL # BLD MANUAL: 0.17 10*3/MM3 (ref 0–0.4)
EOSINOPHIL NFR BLD MANUAL: 1 % (ref 0.3–6.2)
ERYTHROCYTE [DISTWIDTH] IN BLOOD BY AUTOMATED COUNT: 13.6 % (ref 12.3–15.4)
FLUAV SUBTYP SPEC NAA+PROBE: NOT DETECTED
FLUBV RNA ISLT QL NAA+PROBE: NOT DETECTED
GFR SERPL CREATININE-BSD FRML MDRD: 63 ML/MIN/1.73
GLOBULIN UR ELPH-MCNC: 2.8 GM/DL
GLUCOSE BLDC GLUCOMTR-MCNC: 186 MG/DL (ref 70–130)
GLUCOSE SERPL-MCNC: 176 MG/DL (ref 65–99)
HADV DNA SPEC NAA+PROBE: NOT DETECTED
HCOV 229E RNA SPEC QL NAA+PROBE: NOT DETECTED
HCOV HKU1 RNA SPEC QL NAA+PROBE: NOT DETECTED
HCOV NL63 RNA SPEC QL NAA+PROBE: NOT DETECTED
HCOV OC43 RNA SPEC QL NAA+PROBE: NOT DETECTED
HCT VFR BLD AUTO: 43.5 % (ref 37.5–51)
HGB BLD-MCNC: 14.8 G/DL (ref 13–17.7)
HMPV RNA NPH QL NAA+NON-PROBE: NOT DETECTED
HPIV1 RNA SPEC QL NAA+PROBE: NOT DETECTED
HPIV2 RNA SPEC QL NAA+PROBE: NOT DETECTED
HPIV3 RNA NPH QL NAA+PROBE: NOT DETECTED
HPIV4 P GENE NPH QL NAA+PROBE: NOT DETECTED
INR PPP: 1.03 (ref 0.9–1.1)
LEFT ATRIUM VOLUME INDEX: 17 ML/M2
LYMPHOCYTES # BLD MANUAL: 4.96 10*3/MM3 (ref 0.7–3.1)
LYMPHOCYTES NFR BLD MANUAL: 11.2 % (ref 5–12)
LYMPHOCYTES NFR BLD MANUAL: 29.6 % (ref 19.6–45.3)
M PNEUMO IGG SER IA-ACNC: NOT DETECTED
MAGNESIUM SERPL-MCNC: 2 MG/DL (ref 1.6–2.4)
MCH RBC QN AUTO: 30.1 PG (ref 26.6–33)
MCHC RBC AUTO-ENTMCNC: 34 G/DL (ref 31.5–35.7)
MCV RBC AUTO: 88.4 FL (ref 79–97)
MONOCYTES # BLD AUTO: 1.88 10*3/MM3 (ref 0.1–0.9)
NEUTROPHILS # BLD AUTO: 9.75 10*3/MM3 (ref 1.7–7)
NEUTROPHILS NFR BLD MANUAL: 58.2 % (ref 42.7–76)
NT-PROBNP SERPL-MCNC: 82.3 PG/ML (ref 0–900)
PLAT MORPH BLD: NORMAL
PLATELET # BLD AUTO: 274 10*3/MM3 (ref 140–450)
PMV BLD AUTO: 10.5 FL (ref 6–12)
POIKILOCYTOSIS BLD QL SMEAR: ABNORMAL
POTASSIUM SERPL-SCNC: 4.4 MMOL/L (ref 3.5–5.2)
PROT SERPL-MCNC: 6.9 G/DL (ref 6–8.5)
PROTHROMBIN TIME: 13.3 SECONDS (ref 11.7–14.2)
QT INTERVAL: 396 MS
QT INTERVAL: 416 MS
RBC # BLD AUTO: 4.92 10*6/MM3 (ref 4.14–5.8)
RHINOVIRUS RNA SPEC NAA+PROBE: NOT DETECTED
RSV RNA NPH QL NAA+NON-PROBE: NOT DETECTED
SARS-COV-2 RNA NPH QL NAA+NON-PROBE: NOT DETECTED
SODIUM SERPL-SCNC: 134 MMOL/L (ref 136–145)
TROPONIN T SERPL-MCNC: 1.82 NG/ML (ref 0–0.03)
TROPONIN T SERPL-MCNC: <0.01 NG/ML (ref 0–0.03)
WBC # BLD AUTO: 16.76 10*3/MM3 (ref 3.4–10.8)
WBC MORPH BLD: NORMAL

## 2020-11-22 PROCEDURE — C1725 CATH, TRANSLUMIN NON-LASER: HCPCS | Performed by: INTERNAL MEDICINE

## 2020-11-22 PROCEDURE — 92928 PRQ TCAT PLMT NTRAC ST 1 LES: CPT | Performed by: INTERNAL MEDICINE

## 2020-11-22 PROCEDURE — 25010000002 PHENYLEPHRINE PER 1 ML: Performed by: INTERNAL MEDICINE

## 2020-11-22 PROCEDURE — 85347 COAGULATION TIME ACTIVATED: CPT

## 2020-11-22 PROCEDURE — 83880 ASSAY OF NATRIURETIC PEPTIDE: CPT | Performed by: EMERGENCY MEDICINE

## 2020-11-22 PROCEDURE — C1769 GUIDE WIRE: HCPCS | Performed by: INTERNAL MEDICINE

## 2020-11-22 PROCEDURE — 4A023N7 MEASUREMENT OF CARDIAC SAMPLING AND PRESSURE, LEFT HEART, PERCUTANEOUS APPROACH: ICD-10-PCS | Performed by: INTERNAL MEDICINE

## 2020-11-22 PROCEDURE — 0 IOPAMIDOL PER 1 ML: Performed by: INTERNAL MEDICINE

## 2020-11-22 PROCEDURE — 93458 L HRT ARTERY/VENTRICLE ANGIO: CPT | Performed by: INTERNAL MEDICINE

## 2020-11-22 PROCEDURE — 25010000002 MIDAZOLAM PER 1 MG: Performed by: INTERNAL MEDICINE

## 2020-11-22 PROCEDURE — 99223 1ST HOSP IP/OBS HIGH 75: CPT | Performed by: INTERNAL MEDICINE

## 2020-11-22 PROCEDURE — 25010000002 AMIODARONE PER 30 MG: Performed by: INTERNAL MEDICINE

## 2020-11-22 PROCEDURE — C1887 CATHETER, GUIDING: HCPCS | Performed by: INTERNAL MEDICINE

## 2020-11-22 PROCEDURE — 93005 ELECTROCARDIOGRAM TRACING: CPT | Performed by: EMERGENCY MEDICINE

## 2020-11-22 PROCEDURE — C9606 PERC D-E COR REVASC W AMI S: HCPCS | Performed by: INTERNAL MEDICINE

## 2020-11-22 PROCEDURE — 71045 X-RAY EXAM CHEST 1 VIEW: CPT

## 2020-11-22 PROCEDURE — C9600 PERC DRUG-EL COR STENT SING: HCPCS | Performed by: INTERNAL MEDICINE

## 2020-11-22 PROCEDURE — C1874 STENT, COATED/COV W/DEL SYS: HCPCS | Performed by: INTERNAL MEDICINE

## 2020-11-22 PROCEDURE — 85730 THROMBOPLASTIN TIME PARTIAL: CPT | Performed by: EMERGENCY MEDICINE

## 2020-11-22 PROCEDURE — 93306 TTE W/DOPPLER COMPLETE: CPT | Performed by: INTERNAL MEDICINE

## 2020-11-22 PROCEDURE — 83735 ASSAY OF MAGNESIUM: CPT | Performed by: EMERGENCY MEDICINE

## 2020-11-22 PROCEDURE — 25010000002 PERFLUTREN (DEFINITY) 8.476 MG IN SODIUM CHLORIDE 0.9 % 10 ML INJECTION: Performed by: INTERNAL MEDICINE

## 2020-11-22 PROCEDURE — 82962 GLUCOSE BLOOD TEST: CPT

## 2020-11-22 PROCEDURE — 84484 ASSAY OF TROPONIN QUANT: CPT | Performed by: EMERGENCY MEDICINE

## 2020-11-22 PROCEDURE — 25010000002 ONDANSETRON PER 1 MG

## 2020-11-22 PROCEDURE — 93005 ELECTROCARDIOGRAM TRACING: CPT | Performed by: INTERNAL MEDICINE

## 2020-11-22 PROCEDURE — 25010000002 FENTANYL CITRATE (PF) 100 MCG/2ML SOLUTION: Performed by: INTERNAL MEDICINE

## 2020-11-22 PROCEDURE — 85025 COMPLETE CBC W/AUTO DIFF WBC: CPT | Performed by: EMERGENCY MEDICINE

## 2020-11-22 PROCEDURE — 25010000002 HEPARIN (PORCINE) PER 1000 UNITS: Performed by: INTERNAL MEDICINE

## 2020-11-22 PROCEDURE — 5A2204Z RESTORATION OF CARDIAC RHYTHM, SINGLE: ICD-10-PCS | Performed by: INTERNAL MEDICINE

## 2020-11-22 PROCEDURE — 93306 TTE W/DOPPLER COMPLETE: CPT

## 2020-11-22 PROCEDURE — 80053 COMPREHEN METABOLIC PANEL: CPT | Performed by: EMERGENCY MEDICINE

## 2020-11-22 PROCEDURE — 0202U NFCT DS 22 TRGT SARS-COV-2: CPT | Performed by: EMERGENCY MEDICINE

## 2020-11-22 PROCEDURE — 99152 MOD SED SAME PHYS/QHP 5/>YRS: CPT | Performed by: INTERNAL MEDICINE

## 2020-11-22 PROCEDURE — 25010000002 HEPARIN (PORCINE) PER 1000 UNITS: Performed by: EMERGENCY MEDICINE

## 2020-11-22 PROCEDURE — B2111ZZ FLUOROSCOPY OF MULTIPLE CORONARY ARTERIES USING LOW OSMOLAR CONTRAST: ICD-10-PCS | Performed by: INTERNAL MEDICINE

## 2020-11-22 PROCEDURE — 99284 EMERGENCY DEPT VISIT MOD MDM: CPT

## 2020-11-22 PROCEDURE — 84484 ASSAY OF TROPONIN QUANT: CPT | Performed by: INTERNAL MEDICINE

## 2020-11-22 PROCEDURE — 25010000002 MORPHINE PER 10 MG

## 2020-11-22 PROCEDURE — 92941 PRQ TRLML REVSC TOT OCCL AMI: CPT | Performed by: INTERNAL MEDICINE

## 2020-11-22 PROCEDURE — 027136Z DILATION OF CORONARY ARTERY, TWO ARTERIES WITH THREE DRUG-ELUTING INTRALUMINAL DEVICES, PERCUTANEOUS APPROACH: ICD-10-PCS | Performed by: INTERNAL MEDICINE

## 2020-11-22 PROCEDURE — 85610 PROTHROMBIN TIME: CPT | Performed by: EMERGENCY MEDICINE

## 2020-11-22 PROCEDURE — C1894 INTRO/SHEATH, NON-LASER: HCPCS | Performed by: INTERNAL MEDICINE

## 2020-11-22 PROCEDURE — 85007 BL SMEAR W/DIFF WBC COUNT: CPT | Performed by: EMERGENCY MEDICINE

## 2020-11-22 DEVICE — XIENCE SIERRA™ EVEROLIMUS ELUTING CORONARY STENT SYSTEM 3.25 MM X 23 MM / RAPID-EXCHANGE
Type: IMPLANTABLE DEVICE | Status: FUNCTIONAL
Brand: XIENCE SIERRA™

## 2020-11-22 DEVICE — XIENCE SIERRA™ EVEROLIMUS ELUTING CORONARY STENT SYSTEM 4.00 MM X 15 MM / RAPID-EXCHANGE
Type: IMPLANTABLE DEVICE | Status: FUNCTIONAL
Brand: XIENCE SIERRA™

## 2020-11-22 DEVICE — XIENCE SIERRA™ EVEROLIMUS ELUTING CORONARY STENT SYSTEM 2.75 MM X 12 MM / RAPID-EXCHANGE
Type: IMPLANTABLE DEVICE | Status: FUNCTIONAL
Brand: XIENCE SIERRA™

## 2020-11-22 RX ORDER — MORPHINE SULFATE 2 MG/ML
INJECTION, SOLUTION INTRAMUSCULAR; INTRAVENOUS
Status: COMPLETED
Start: 2020-11-22 | End: 2020-11-22

## 2020-11-22 RX ORDER — SODIUM CHLORIDE 9 MG/ML
50 INJECTION, SOLUTION INTRAVENOUS CONTINUOUS
Status: DISCONTINUED | OUTPATIENT
Start: 2020-11-22 | End: 2020-11-24 | Stop reason: HOSPADM

## 2020-11-22 RX ORDER — LIDOCAINE HYDROCHLORIDE 20 MG/ML
INJECTION, SOLUTION INFILTRATION; PERINEURAL AS NEEDED
Status: DISCONTINUED | OUTPATIENT
Start: 2020-11-22 | End: 2020-11-22 | Stop reason: HOSPADM

## 2020-11-22 RX ORDER — NICOTINE POLACRILEX 4 MG
15 LOZENGE BUCCAL
Status: DISCONTINUED | OUTPATIENT
Start: 2020-11-22 | End: 2020-11-24 | Stop reason: HOSPADM

## 2020-11-22 RX ORDER — METOPROLOL TARTRATE 5 MG/5ML
INJECTION INTRAVENOUS AS NEEDED
Status: DISCONTINUED | OUTPATIENT
Start: 2020-11-22 | End: 2020-11-22 | Stop reason: HOSPADM

## 2020-11-22 RX ORDER — ONDANSETRON 2 MG/ML
4 INJECTION INTRAMUSCULAR; INTRAVENOUS EVERY 6 HOURS PRN
Status: DISCONTINUED | OUTPATIENT
Start: 2020-11-22 | End: 2020-11-24 | Stop reason: HOSPADM

## 2020-11-22 RX ORDER — TEMAZEPAM 15 MG/1
15 CAPSULE ORAL NIGHTLY PRN
Status: DISCONTINUED | OUTPATIENT
Start: 2020-11-22 | End: 2020-11-24 | Stop reason: HOSPADM

## 2020-11-22 RX ORDER — ONDANSETRON 4 MG/1
4 TABLET, FILM COATED ORAL EVERY 6 HOURS PRN
Status: DISCONTINUED | OUTPATIENT
Start: 2020-11-22 | End: 2020-11-24 | Stop reason: HOSPADM

## 2020-11-22 RX ORDER — FENTANYL CITRATE 50 UG/ML
INJECTION, SOLUTION INTRAMUSCULAR; INTRAVENOUS AS NEEDED
Status: DISCONTINUED | OUTPATIENT
Start: 2020-11-22 | End: 2020-11-22 | Stop reason: HOSPADM

## 2020-11-22 RX ORDER — AMIODARONE HYDROCHLORIDE 50 MG/ML
INJECTION, SOLUTION INTRAVENOUS AS NEEDED
Status: DISCONTINUED | OUTPATIENT
Start: 2020-11-22 | End: 2020-11-22 | Stop reason: HOSPADM

## 2020-11-22 RX ORDER — ASPIRIN 81 MG/1
81 TABLET ORAL DAILY
Status: DISCONTINUED | OUTPATIENT
Start: 2020-11-22 | End: 2020-11-24 | Stop reason: HOSPADM

## 2020-11-22 RX ORDER — ONDANSETRON 2 MG/ML
INJECTION INTRAMUSCULAR; INTRAVENOUS
Status: COMPLETED
Start: 2020-11-22 | End: 2020-11-22

## 2020-11-22 RX ORDER — ONDANSETRON 2 MG/ML
4 INJECTION INTRAMUSCULAR; INTRAVENOUS ONCE
Status: COMPLETED | OUTPATIENT
Start: 2020-11-22 | End: 2020-11-22

## 2020-11-22 RX ORDER — MORPHINE SULFATE 2 MG/ML
4 INJECTION, SOLUTION INTRAMUSCULAR; INTRAVENOUS ONCE
Status: COMPLETED | OUTPATIENT
Start: 2020-11-22 | End: 2020-11-22

## 2020-11-22 RX ORDER — DEXTROSE MONOHYDRATE 25 G/50ML
25 INJECTION, SOLUTION INTRAVENOUS
Status: DISCONTINUED | OUTPATIENT
Start: 2020-11-22 | End: 2020-11-24 | Stop reason: HOSPADM

## 2020-11-22 RX ORDER — MIDAZOLAM HYDROCHLORIDE 1 MG/ML
INJECTION INTRAMUSCULAR; INTRAVENOUS AS NEEDED
Status: DISCONTINUED | OUTPATIENT
Start: 2020-11-22 | End: 2020-11-22 | Stop reason: HOSPADM

## 2020-11-22 RX ORDER — HYDROCODONE BITARTRATE AND ACETAMINOPHEN 5; 325 MG/1; MG/1
1 TABLET ORAL EVERY 4 HOURS PRN
Status: DISCONTINUED | OUTPATIENT
Start: 2020-11-22 | End: 2020-11-24 | Stop reason: HOSPADM

## 2020-11-22 RX ORDER — HEPARIN SODIUM 1000 [USP'U]/ML
INJECTION, SOLUTION INTRAVENOUS; SUBCUTANEOUS AS NEEDED
Status: DISCONTINUED | OUTPATIENT
Start: 2020-11-22 | End: 2020-11-22 | Stop reason: HOSPADM

## 2020-11-22 RX ORDER — HEPARIN SODIUM 5000 [USP'U]/ML
40.9 INJECTION, SOLUTION INTRAVENOUS; SUBCUTANEOUS ONCE
Status: COMPLETED | OUTPATIENT
Start: 2020-11-22 | End: 2020-11-22

## 2020-11-22 RX ORDER — ROSUVASTATIN CALCIUM 20 MG/1
20 TABLET, COATED ORAL DAILY
Status: DISCONTINUED | OUTPATIENT
Start: 2020-11-22 | End: 2020-11-24 | Stop reason: HOSPADM

## 2020-11-22 RX ORDER — HEPARIN SODIUM 5000 [USP'U]/ML
30-40.9 INJECTION, SOLUTION INTRAVENOUS; SUBCUTANEOUS EVERY 6 HOURS PRN
Status: DISCONTINUED | OUTPATIENT
Start: 2020-11-22 | End: 2020-11-22

## 2020-11-22 RX ORDER — HEPARIN SODIUM 10000 [USP'U]/100ML
10.2 INJECTION, SOLUTION INTRAVENOUS
Status: DISCONTINUED | OUTPATIENT
Start: 2020-11-22 | End: 2020-11-22

## 2020-11-22 RX ORDER — PRASUGREL 10 MG/1
10 TABLET, FILM COATED ORAL DAILY
Status: DISCONTINUED | OUTPATIENT
Start: 2020-11-23 | End: 2020-11-24 | Stop reason: HOSPADM

## 2020-11-22 RX ORDER — SODIUM CHLORIDE 9 MG/ML
INJECTION, SOLUTION INTRAVENOUS CONTINUOUS PRN
Status: COMPLETED | OUTPATIENT
Start: 2020-11-22 | End: 2020-11-22

## 2020-11-22 RX ORDER — ACETAMINOPHEN 325 MG/1
650 TABLET ORAL EVERY 4 HOURS PRN
Status: DISCONTINUED | OUTPATIENT
Start: 2020-11-22 | End: 2020-11-24 | Stop reason: HOSPADM

## 2020-11-22 RX ADMIN — METOPROLOL TARTRATE 12.5 MG: 25 TABLET, FILM COATED ORAL at 13:09

## 2020-11-22 RX ADMIN — HYDROCODONE BITARTRATE AND ACETAMINOPHEN 1 TABLET: 5; 325 TABLET ORAL at 16:53

## 2020-11-22 RX ADMIN — MORPHINE SULFATE 4 MG: 2 INJECTION, SOLUTION INTRAMUSCULAR; INTRAVENOUS at 10:39

## 2020-11-22 RX ADMIN — ASPIRIN 81 MG: 81 TABLET, COATED ORAL at 13:09

## 2020-11-22 RX ADMIN — PERFLUTREN 3 ML: 6.52 INJECTION, SUSPENSION INTRAVENOUS at 13:30

## 2020-11-22 RX ADMIN — HYDROCODONE BITARTRATE AND ACETAMINOPHEN 1 TABLET: 5; 325 TABLET ORAL at 21:11

## 2020-11-22 RX ADMIN — SODIUM CHLORIDE 500 ML: 9 INJECTION, SOLUTION INTRAVENOUS at 10:40

## 2020-11-22 RX ADMIN — HEPARIN SODIUM 4000 UNITS: 5000 INJECTION INTRAVENOUS; SUBCUTANEOUS at 10:43

## 2020-11-22 RX ADMIN — TEMAZEPAM 15 MG: 15 CAPSULE ORAL at 21:11

## 2020-11-22 RX ADMIN — ONDANSETRON 4 MG: 2 INJECTION INTRAMUSCULAR; INTRAVENOUS at 10:39

## 2020-11-22 RX ADMIN — HYDROCODONE BITARTRATE AND ACETAMINOPHEN 1 TABLET: 5; 325 TABLET ORAL at 13:09

## 2020-11-22 RX ADMIN — SODIUM CHLORIDE 50 ML/HR: 9 INJECTION, SOLUTION INTRAVENOUS at 13:09

## 2020-11-22 RX ADMIN — METOPROLOL TARTRATE 12.5 MG: 25 TABLET, FILM COATED ORAL at 21:12

## 2020-11-22 RX ADMIN — ROSUVASTATIN CALCIUM 20 MG: 20 TABLET, FILM COATED ORAL at 21:11

## 2020-11-22 RX ADMIN — HEPARIN SODIUM 10.2 UNITS/KG/HR: 10000 INJECTION, SOLUTION INTRAVENOUS at 10:48

## 2020-11-22 NOTE — ED PROVIDER NOTES
EMERGENCY DEPARTMENT ENCOUNTER    Room Number:  2002/1  Date of encounter:  11/22/2020  PCP: Alonso Granda MD  Historian: Patient      HPI:  Chief Complaint: Chest pain  A complete HPI/ROS/PMH/PSH/SH/FH are unobtainable due to: None    Context: Sylvain Amador is a 60 y.o. male who presents to the ED via [facility] for chest pain.  Patient states the pain started around 4:56 AM this morning described as a sharp anterior chest pain with radiation to the left arm.  Was given 324 mg of aspirin and sublingual nitroglycerin prior to arrival with minimal to no relief.  Had some nausea, denied any dizziness or lightheadedness.  Had some shortness of breath.  History of coronary disease with stents in the past.  This feels different than his previous chest pains.  Denies any recent illnesses with no fevers, chills, cough, vomiting, diarrhea.      MEDICAL RECORD REVIEW    Stress test with myocardial perfusion October 6 of 2020    · Left ventricular ejection fraction is normal. (Calculated EF = 57%).  · Myocardial perfusion imaging indicates a normal myocardial perfusion study with no evidence of ischemia.  · Impressions are consistent with a low risk study.  · Compared to the prior study from 5/4/2018 the current study reveals no changes.    PAST MEDICAL HISTORY  Active Ambulatory Problems     Diagnosis Date Noted   • Coronary atherosclerosis of native coronary vessel    • Hyperlipidemia    • Hypertension    • PAD (peripheral artery disease) (CMS/Formerly [facility])    • Abdominal aortic aneurysm (AAA) without rupture (CMS/Formerly [facility]) 08/01/2018   • Abnormal radiologic findings on diagnostic imaging of left kidney 10/31/2018   • Renal infarct (CMS/Formerly [facility]) 10/31/2018   • Cigarette smoker 07/10/2019   • History of uncontrolled diabetes 07/10/2019   • Charcot's syndrome 10/04/2019   • Diabetes mellitus, type 2 (CMS/Formerly [facility]) 05/03/2016   • Kidney failure 10/27/2018   • Age-related nuclear cataract, bilateral 03/02/2020   • Hypermetropia  03/02/2020   • Presbyopia 03/02/2020   • Regular astigmatism, bilateral 03/02/2020     Resolved Ambulatory Problems     Diagnosis Date Noted   • Chest pain 10/04/2019     Past Medical History:   Diagnosis Date   • CAD (coronary artery disease)    • Diabetes mellitus (CMS/HCC)    • GERD (gastroesophageal reflux disease)    • Heart attack (CMS/HCC)    • Intermittent claudication (CMS/HCC)    • Recovering alcoholic (CMS/HCC)          PAST SURGICAL HISTORY  Past Surgical History:   Procedure Laterality Date   • ARTERIOGRAM AORTIC Left 8/2/2018    Procedure: AORTIC AND LEFT ILIAC ARTERY ANEURYSM REPAIR;  Surgeon: Arturo Huynh MD;  Location: [facility];  Service: Vascular   • CARDIAC CATHETERIZATION  11/2015   • CORONARY ANGIOPLASTY WITH STENT PLACEMENT  2015   • RHINOPLASTY  1975   • VASECTOMY           FAMILY HISTORY  Family History   Problem Relation Age of Onset   • Diabetes Mother    • Hypertension Mother    • Pancreatic cancer Mother    • Cancer Mother    • Esophageal cancer Father    • Cancer Father    • Esophageal cancer Brother    • Cancer Brother    • Cancer Paternal Grandmother    • Diabetes Paternal Grandmother    • Alcohol abuse Brother    • Cancer Brother          SOCIAL HISTORY  Social History     Socioeconomic History   • Marital status:      Spouse name: Not on file   • Number of children: Not on file   • Years of education: Not on file   • Highest education level: Not on file   Occupational History     Employer: [employer]   Tobacco Use   • Smoking status: Current Every Day Smoker     Packs/day: 0.50   • Smokeless tobacco: Current User   Substance and Sexual Activity   • Alcohol use: Yes     Comment: Seldom/ Caffeine use 10 cups daily   • Drug use: No   • Sexual activity: Defer         ALLERGIES  No known drug allergy        REVIEW OF SYSTEMS  Review of Systems     All systems reviewed and negative except for those discussed in HPI.       PHYSICAL EXAM    I have  reviewed the triage vital signs and nursing notes.    ED Triage Vitals [11/22/20 1039]   Temp Heart Rate Resp BP SpO2   -- 58 18 134/93 99 %      Temp src Heart Rate Source Patient Position BP Location FiO2 (%)   -- Monitor -- Right arm --       Physical Exam  General: Awake, alert, ill-appearing, nondiaphoretic  HEENT: Mucous membranes moist, atraumatic, normocephalic, EOMI  Neck: Full ROM  Pulm: Symmetric, nonlabored, lungs CTAB  Cardiovascular: Regular rate and rhythm, normal S1/S2, intact distal pulses  GI: Soft, nontender, nondistended, no rebound, no guarding, bowel sounds present  MSK: Full ROM, no deformity  Skin: Warm, dry  Neuro: Alert and oriented x 3, GCS 15, moving all extremities, no focal deficits  Psych: Calm, cooperative      Surgical mask, protective eye goggles, and gloves used during this encounter. Patient in surgical mask.      LAB RESULTS  Recent Results (from the past 24 hour(s))   ECG 12 Lead    Collection Time: 11/22/20 10:35 AM   Result Value Ref Range    QT Interval 416 ms   Comprehensive Metabolic Panel    Collection Time: 11/22/20 10:40 AM    Specimen: Blood   Result Value Ref Range    Glucose 176 (H) 65 - 99 mg/dL    BUN 20 8 - 23 mg/dL    Creatinine 1.18 0.76 - 1.27 mg/dL    Sodium 134 (L) 136 - 145 mmol/L    Potassium 4.4 3.5 - 5.2 mmol/L    Chloride 103 98 - 107 mmol/L    CO2 18.1 (L) 22.0 - 29.0 mmol/L    Calcium 9.1 8.6 - 10.5 mg/dL    Total Protein 6.9 6.0 - 8.5 g/dL    Albumin 4.10 3.50 - 5.20 g/dL    ALT (SGPT) 17 1 - 41 U/L    AST (SGOT) 11 1 - 40 U/L    Alkaline Phosphatase 59 39 - 117 U/L    Total Bilirubin 0.2 0.0 - 1.2 mg/dL    eGFR Non African Amer 63 >60 mL/min/1.73    Globulin 2.8 gm/dL    A/G Ratio 1.5 g/dL    BUN/Creatinine Ratio 16.9 7.0 - 25.0    Anion Gap 12.9 5.0 - 15.0 mmol/L   BNP    Collection Time: 11/22/20 10:40 AM    Specimen: Blood   Result Value Ref Range    proBNP 82.3 0.0 - 900.0 pg/mL   Troponin    Collection Time: 11/22/20 10:40 AM    Specimen: Blood    Result Value Ref Range    Troponin T <0.010 0.000 - 0.030 ng/mL   Magnesium    Collection Time: 11/22/20 10:40 AM    Specimen: Blood   Result Value Ref Range    Magnesium 2.0 1.6 - 2.4 mg/dL   CBC Auto Differential    Collection Time: 11/22/20 10:40 AM    Specimen: Blood   Result Value Ref Range    WBC 16.76 (H) 3.40 - 10.80 10*3/mm3    RBC 4.92 4.14 - 5.80 10*6/mm3    Hemoglobin 14.8 13.0 - 17.7 g/dL    Hematocrit 43.5 37.5 - 51.0 %    MCV 88.4 79.0 - 97.0 fL    MCH 30.1 26.6 - 33.0 pg    MCHC 34.0 31.5 - 35.7 g/dL    RDW 13.6 12.3 - 15.4 %    RDW-SD 44.3 37.0 - 54.0 fl    MPV 10.5 6.0 - 12.0 fL    Platelets 274 140 - 450 10*3/mm3   Manual Differential    Collection Time: 11/22/20 10:40 AM    Specimen: Blood   Result Value Ref Range    Neutrophil % 58.2 42.7 - 76.0 %    Lymphocyte % 29.6 19.6 - 45.3 %    Monocyte % 11.2 5.0 - 12.0 %    Eosinophil % 1.0 0.3 - 6.2 %    Neutrophils Absolute 9.75 (H) 1.70 - 7.00 10*3/mm3    Lymphocytes Absolute 4.96 (H) 0.70 - 3.10 10*3/mm3    Monocytes Absolute 1.88 (H) 0.10 - 0.90 10*3/mm3    Eosinophils Absolute 0.17 0.00 - 0.40 10*3/mm3    Poikilocytes Slight/1+ None Seen    WBC Morphology Normal Normal    Platelet Morphology Normal Normal   Protime-INR    Collection Time: 11/22/20 10:41 AM    Specimen: Blood   Result Value Ref Range    Protime 13.3 11.7 - 14.2 Seconds    INR 1.03 0.90 - 1.10   aPTT    Collection Time: 11/22/20 10:41 AM    Specimen: Blood   Result Value Ref Range    PTT 27.5 22.7 - 35.4 seconds   Respiratory Panel PCR w/COVID-19(SARS-CoV-2) MARIANO/IKE/GIDEON/PAD/COR/MAD/ALEXANDRA In-House, NP Swab in UTM/VTM, 3-4 HR TAT - Swab, Nasopharynx    Collection Time: 11/22/20 10:41 AM    Specimen: Nasopharynx; Swab   Result Value Ref Range    ADENOVIRUS, PCR Not Detected Not Detected    Coronavirus 229E Not Detected Not Detected    Coronavirus HKU1 Not Detected Not Detected    Coronavirus NL63 Not Detected Not Detected    Coronavirus OC43 Not Detected Not Detected    COVID19 Not  Detected Not Detected - Ref. Range    Human Metapneumovirus Not Detected Not Detected    Human Rhinovirus/Enterovirus Not Detected Not Detected    Influenza A PCR Not Detected Not Detected    Influenza B PCR Not Detected Not Detected    Parainfluenza Virus 1 Not Detected Not Detected    Parainfluenza Virus 2 Not Detected Not Detected    Parainfluenza Virus 3 Not Detected Not Detected    Parainfluenza Virus 4 Not Detected Not Detected    RSV, PCR Not Detected Not Detected    Bordetella pertussis pcr Not Detected Not Detected    Bordetella parapertussis PCR Not Detected Not Detected    Chlamydophila pneumoniae PCR Not Detected Not Detected    Mycoplasma pneumo by PCR Not Detected Not Detected   ECG 12 Lead    Collection Time: 11/22/20 12:16 PM   Result Value Ref Range    QT Interval 396 ms   Troponin    Collection Time: 11/22/20  1:23 PM    Specimen: Blood   Result Value Ref Range    Troponin T 1.820 (C) 0.000 - 0.030 ng/mL       Ordered the above labs and independently reviewed the results.        RADIOLOGY  Xr Chest 1 View    Result Date: 11/22/2020  XR CHEST 1 VW-  HISTORY: Male who is 60 years-old,  chest pain  TECHNIQUE: Frontal view of the chest  COMPARISON: 11/02/2018  FINDINGS: Heart size is normal. Aorta is tortuous. Pulmonary vasculature is unremarkable. No focal pulmonary consolidation, pleural effusion, or pneumothorax. No acute osseous process.      No focal pulmonary consolidation. Tortuous aorta. Follow-up as clinically indicated.  This report was finalized on 11/22/2020 11:24 AM by Dr. Geoff Rodriguez M.D.        I ordered the above noted radiological studies. Reviewed by me.  See dictation for official radiology interpretation.      PROCEDURES    Critical Care  Performed by: Kingston Rick MD  Authorized by: Kingston Rick MD     Critical care provider statement:     Critical care time (minutes):  31    Critical care time was exclusive of:  Separately billable procedures and treating other  patients    Critical care was necessary to treat or prevent imminent or life-threatening deterioration of the following conditions:  Cardiac failure    Critical care was time spent personally by me on the following activities:  Development of treatment plan with patient or surrogate, discussions with consultants, evaluation of patient's response to treatment, examination of patient, obtaining history from patient or surrogate, ordering and performing treatments and interventions, ordering and review of laboratory studies, ordering and review of radiographic studies, pulse oximetry, re-evaluation of patient's condition and review of old charts      EKG    EKG Time: 1035  Rhythm/Rate: Sinus rhythm with rate of 61  Left axis deviation with nonspecific IVCD  ST elevations in leads I, aVL, V2 through V5 with ST depressions in leads II, 3, aVF    Interpreted Contemporaneously by me, independently viewed  These are new changes as compared to prior January 2020      MEDICATIONS GIVEN IN ER    Medications   aspirin EC tablet 81 mg (81 mg Oral Given 11/22/20 1309)   rosuvastatin (CRESTOR) tablet 20 mg (has no administration in time range)   metoprolol tartrate (LOPRESSOR) tablet 12.5 mg (12.5 mg Oral Given 11/22/20 1309)   sodium chloride 0.9 % infusion (50 mL/hr Intravenous New Bag 11/22/20 1309)   acetaminophen (TYLENOL) tablet 650 mg (has no administration in time range)   HYDROcodone-acetaminophen (NORCO) 5-325 MG per tablet 1 tablet (1 tablet Oral Given 11/22/20 1309)   temazepam (RESTORIL) capsule 15 mg (has no administration in time range)   ondansetron (ZOFRAN) tablet 4 mg (has no administration in time range)     Or   ondansetron (ZOFRAN) injection 4 mg (has no administration in time range)   prasugrel (EFFIENT) tablet 10 mg (has no administration in time range)   perflutren (DEFINITY) 8.476 mg in sodium chloride 0.9 % 10 mL injection (has no administration in time range)   sodium chloride 0.9 % bolus 500 mL (500 mL  Intravenous New Bag 11/22/20 1040)   morphine injection 4 mg (4 mg Intravenous Given 11/22/20 1039)   ondansetron (ZOFRAN) injection 4 mg (4 mg Intravenous Given 11/22/20 1039)   heparin (porcine) 5000 UNIT/ML injection 4,000 Units (4,000 Units Intravenous Given 11/22/20 1043)   sodium chloride 0.9 % infusion (125 mL/hr Intravenous New Bag 11/22/20 1108)         PROGRESS, DATA ANALYSIS, CONSULTS, AND MEDICAL DECISION MAKING    All labs have been independently reviewed by me.  All radiology studies have been reviewed by me and discussed with radiologist dictating the report.   EKG's independently viewed and interpreted by me.  Discussion below represents my analysis of pertinent findings related to patient's condition, differential diagnosis, treatment plan and final disposition.    HEART score 7    Obvious STEMI noted on EMS EKG that was transmitted to us.  Cath Lab activated prior to arrival.  Patient was given morphine, IV fluids, and heparin in the emergency department, was rapidly taken up to Cath Lab with cardiology for intervention.  Pain was improving in the emergency department.  No evidence of any arrhythmias while in the emergency department.    ED Course as of Nov 22 1353   Sun Nov 22, 2020   1037 Discussed EKG findings with Dr. Vaughn, Interventional Cardiology, and discussed the need for emergent Cath Lab activation.    [DC]   6217 Dr. Vaughn has evaluated at bedside, taking to the Cath lab emergently    [DC]      ED Course User Index  [DC] Kingston Rick MD       AS OF 13:53 EST VITALS:    BP - 113/67  HR - 68  TEMP - 97.2 °F (36.2 °C) (Tympanic)  02 SATS - 97%        DIAGNOSIS  Final diagnoses:   ST elevation myocardial infarction (STEMI), unspecified artery (CMS/HCC)   Chest pain, unspecified type   History of coronary artery disease   History of hypertension   History of diabetes mellitus         DISPOSITION  To the Cath lab             Kingston Rick MD  11/22/20 0320

## 2020-11-22 NOTE — H&P
Date of Hospital Visit: 20   Encounter Provider: Suraj Vaughn MD  Place of Service: [facility] CARDIOLOGY  Patient Name: Sylvain Amador  :1960      Chief complaint  Anterior lateral ST elevation MI  Ventricular tachycardia  History of coronary artery disease    History of Present Illness  60-year-old male with medical history of coronary disease with prior PTCA to the OM, inferior infarct and PCI to the RCA, diabetes mellitus, hyperlipidemia who presented with severe central chest discomfort starting earlier today while at rest.  Radiation to the right arm.  He also had some nausea.  He was noted to have anterolateral ST elevation MI in the catheterization lab was activated.  He had and acutely occluded proximal LAD and underwent PCI of the proximal to mid LAD with a 3.25 x 33 mm Xience Haley drug-eluting stent, postdilated to high pressure with a 3.5 mm NC balloon in the proximal to mid stent segment.  He also had a lesion in the mid to distal LAD that was stented with a 2.75 x 12 mm Xience Haley drug-eluting stent.  His ostial RCA upon engagement also had a severe lesion and this was stented with a 4.0 x 15 mm Xience Haley drug-eluting stent.  Prior to revascularization of the LAD had ventricular tachycardia and did receive 1 shock.  He subsequently with that and amiodarone converted to sinus rhythm.  He currently is doing well and states that his chest pain has significantly improved.    Past Medical History:   Diagnosis Date   • CAD (coronary artery disease)    • Diabetes mellitus (CMS/HCC)    • GERD (gastroesophageal reflux disease)    • Heart attack (CMS/HCC)    • Hyperlipidemia    • Hypertension    • Intermittent claudication (CMS/HCC)    • PAD (peripheral artery disease) (CMS/HCC)     of lower extremities   • Recovering alcoholic (CMS/HCC)        Past Surgical History:   Procedure Laterality Date   • ARTERIOGRAM AORTIC Left 2018    Procedure:  AORTIC AND LEFT ILIAC ARTERY ANEURYSM REPAIR;  Surgeon: Arturo Huynh MD;  Location: [facility];  Service: Vascular   • CARDIAC CATHETERIZATION  11/2015   • CORONARY ANGIOPLASTY WITH STENT PLACEMENT  2015   • RHINOPLASTY  1975   • VASECTOMY         Medications Prior to Admission   Medication Sig Dispense Refill Last Dose   • aspirin 81 MG tablet Take 1 tablet by mouth Daily. 30 tablet 0    • lisinopril (PRINIVIL,ZESTRIL) 5 MG tablet Take 1 tablet by mouth Daily. 90 tablet 3    • metFORMIN (GLUCOPHAGE) 500 MG tablet Take 2 tablets by mouth 2 (Two) Times a Day With Meals. 360 tablet 3    • rosuvastatin (Crestor) 10 MG tablet Take 1 tablet by mouth Daily. 90 tablet 3        Current Meds  Scheduled Meds:sodium chloride, 500 mL, Intravenous, Once      Continuous Infusions:heparin (porcine), 10.2 Units/kg/hr, Last Rate: 10.2 Units/kg/hr (11/22/20 1048)      PRN Meds:.•  [MAR Hold] heparin (porcine)    Allergies as of 11/22/2020 - Reviewed 11/22/2020   Allergen Reaction Noted   • No known drug allergy  10/23/2014       Social History     Socioeconomic History   • Marital status:      Spouse name: Not on file   • Number of children: Not on file   • Years of education: Not on file   • Highest education level: Not on file   Occupational History     Employer: [employer]   Tobacco Use   • Smoking status: Current Every Day Smoker     Packs/day: 0.50   • Smokeless tobacco: Current User   Substance and Sexual Activity   • Alcohol use: Yes     Comment: Seldom/ Caffeine use 10 cups daily   • Drug use: No   • Sexual activity: Defer       Family History   Problem Relation Age of Onset   • Diabetes Mother    • Hypertension Mother    • Pancreatic cancer Mother    • Cancer Mother    • Esophageal cancer Father    • Cancer Father    • Esophageal cancer Brother    • Cancer Brother    • Cancer Paternal Grandmother    • Diabetes Paternal Grandmother    • Alcohol abuse Brother    • Cancer Brother   "      REVIEW OF SYSTEMS:   12 point ROS was performed and is negative except as outlined in HPI        Objective:   Temp:  [97.2 °F (36.2 °C)] 97.2 °F (36.2 °C)  Heart Rate:  [58-63] 63  Resp:  [18] 18  BP: (117-134)/(75-93) 117/75  Body mass index is 29.21 kg/m².  Flowsheet Rows      First Filed Value   Admission Height  182.9 cm (72\") Documented at 11/22/2020 1047   Admission Weight  97.7 kg (215 lb 6.4 oz) Documented at 11/22/2020 1039        Vitals:    11/22/20 1047   BP: 117/75   Pulse: 63   Resp: 18   Temp:    SpO2: 96%   Temp:  [97.2 °F (36.2 °C)] 97.2 °F (36.2 °C)  Heart Rate:  [58-63] 63  Resp:  [18] 18  BP: (117-134)/(75-93) 117/75  No intake or output data in the 24 hours ending 11/22/20 1106  Flowsheet Rows      First Filed Value   Admission Height  182.9 cm (72\") Documented at 11/22/2020 1047   Admission Weight  97.7 kg (215 lb 6.4 oz) Documented at 11/22/2020 1039          General Appearance:    Alert, cooperative, in no acute distress   Head:    Normocephalic, without obvious abnormality, atraumatic       Neck:   No adenopathy, supple, no thyromegaly, no carotid bruit, no    JVD   Lungs:     Clear to auscultation bilaterally, no wheezes, rales, or     rhonchi    Heart:    Normal rate, regular rhythm, no murmur, no rub, no gallop   Chest Wall:    No abnormalities observed   Abdomen:     Normal bowel sounds, soft, nontender, nondistended,            no rebound tenderness   Extremities:   No cyanosis, clubbing, or edema   Pulses:   Pulses palpable and equal bilaterally   Skin:   No bleeding or rash       Neurologic:   Cranial nerves 2 - 12 grossly intact, sensation intact                     Lab Review:          Invalid input(s): LABALBU, PROT      @[email or web address]@  Results from last 7 days   Lab Units 11/22/20  1040   WBC 10*3/mm3 16.76*   HEMOGLOBIN g/dL 14.8   HEMATOCRIT % 43.5   PLATELETS 10*3/mm3 274               I personally viewed and interpreted the patient's EKG/Telemetry data  Assessment and " Plan:  1.  Coronary artery disease: Prior PTCA to the OM and PCI to the RCA.  2.  Anterolateral ST elevation MI: Acutely occluded LAD.  Status post PCI to the mid LAD  3.  Ventricular tachycardia: Status post defibrillation in the Cath Lab  4.  Essential hypertension  5.  Peripheral arterial disease  6.  Hyperlipidemia, mixed    -Patient underwent PCI to the mid LAD in the setting of acute anterior lateral ST elevation MI as documented.  3.25 x 33 mm Xience Haley drug-eluting stent in the mid LAD postdilated in the proximal to mid segment with a 3.5 mm noncompliant trek balloon to high pressure.  There was also a spot in the distal LAD that was stented with a 2.75 x 12 mm Xience Haley drug-eluting stent.  -Angiography of the RCA at that time also showed a flow-limiting stenosis of the ostial RCA that was stented with a 4.0 x 15 mm Xience Haley drug-eluting stent.  -Metoprolol with hold parameters  -Recommend aspirin and prasugrel  -Repeat lipid panel will be ordered.  Transthoracic echocardiogram is also been ordered.    Suraj Vaughn MD  11/22/20  11:06 EST.

## 2020-11-23 LAB
ACT BLD: 235 SECONDS (ref 82–152)
ACT BLD: 241 SECONDS (ref 82–152)
ANION GAP SERPL CALCULATED.3IONS-SCNC: 9.2 MMOL/L (ref 5–15)
BUN SERPL-MCNC: 16 MG/DL (ref 8–23)
BUN/CREAT SERPL: 14.2 (ref 7–25)
CALCIUM SPEC-SCNC: 8.6 MG/DL (ref 8.6–10.5)
CHLORIDE SERPL-SCNC: 105 MMOL/L (ref 98–107)
CHOLEST SERPL-MCNC: 173 MG/DL (ref 0–200)
CO2 SERPL-SCNC: 19.8 MMOL/L (ref 22–29)
CREAT SERPL-MCNC: 1.13 MG/DL (ref 0.76–1.27)
DEPRECATED RDW RBC AUTO: 45.5 FL (ref 37–54)
ERYTHROCYTE [DISTWIDTH] IN BLOOD BY AUTOMATED COUNT: 13.8 % (ref 12.3–15.4)
GFR SERPL CREATININE-BSD FRML MDRD: 66 ML/MIN/1.73
GLUCOSE BLDC GLUCOMTR-MCNC: 156 MG/DL (ref 70–130)
GLUCOSE BLDC GLUCOMTR-MCNC: 166 MG/DL (ref 70–130)
GLUCOSE BLDC GLUCOMTR-MCNC: 170 MG/DL (ref 70–130)
GLUCOSE BLDC GLUCOMTR-MCNC: 187 MG/DL (ref 70–130)
GLUCOSE BLDC GLUCOMTR-MCNC: 189 MG/DL (ref 70–130)
GLUCOSE SERPL-MCNC: 157 MG/DL (ref 65–99)
HBA1C MFR BLD: 7.3 % (ref 4.8–5.6)
HCT VFR BLD AUTO: 41.5 % (ref 37.5–51)
HDLC SERPL-MCNC: 29 MG/DL (ref 40–60)
HGB BLD-MCNC: 14.2 G/DL (ref 13–17.7)
LDLC SERPL CALC-MCNC: 93 MG/DL (ref 0–100)
LDLC/HDLC SERPL: 2.86 {RATIO}
MCH RBC QN AUTO: 30.7 PG (ref 26.6–33)
MCHC RBC AUTO-ENTMCNC: 34.2 G/DL (ref 31.5–35.7)
MCV RBC AUTO: 89.8 FL (ref 79–97)
PLATELET # BLD AUTO: 156 10*3/MM3 (ref 140–450)
PMV BLD AUTO: 10.5 FL (ref 6–12)
POTASSIUM SERPL-SCNC: 4.5 MMOL/L (ref 3.5–5.2)
QT INTERVAL: 383 MS
RBC # BLD AUTO: 4.62 10*6/MM3 (ref 4.14–5.8)
SODIUM SERPL-SCNC: 134 MMOL/L (ref 136–145)
TRIGL SERPL-MCNC: 306 MG/DL (ref 0–150)
VLDLC SERPL-MCNC: 51 MG/DL (ref 5–40)
WBC # BLD AUTO: 15.79 10*3/MM3 (ref 3.4–10.8)

## 2020-11-23 PROCEDURE — 80061 LIPID PANEL: CPT | Performed by: INTERNAL MEDICINE

## 2020-11-23 PROCEDURE — 85027 COMPLETE CBC AUTOMATED: CPT | Performed by: INTERNAL MEDICINE

## 2020-11-23 PROCEDURE — 82962 GLUCOSE BLOOD TEST: CPT

## 2020-11-23 PROCEDURE — 93010 ELECTROCARDIOGRAM REPORT: CPT | Performed by: INTERNAL MEDICINE

## 2020-11-23 PROCEDURE — 99232 SBSQ HOSP IP/OBS MODERATE 35: CPT | Performed by: INTERNAL MEDICINE

## 2020-11-23 PROCEDURE — 93005 ELECTROCARDIOGRAM TRACING: CPT | Performed by: INTERNAL MEDICINE

## 2020-11-23 PROCEDURE — 83036 HEMOGLOBIN GLYCOSYLATED A1C: CPT | Performed by: INTERNAL MEDICINE

## 2020-11-23 PROCEDURE — 80048 BASIC METABOLIC PNL TOTAL CA: CPT | Performed by: INTERNAL MEDICINE

## 2020-11-23 PROCEDURE — 25010000002 FUROSEMIDE PER 20 MG: Performed by: INTERNAL MEDICINE

## 2020-11-23 PROCEDURE — 63710000001 INSULIN LISPRO (HUMAN) PER 5 UNITS: Performed by: NURSE PRACTITIONER

## 2020-11-23 PROCEDURE — 63710000001 INSULIN LISPRO (HUMAN) PER 5 UNITS: Performed by: INTERNAL MEDICINE

## 2020-11-23 RX ORDER — FUROSEMIDE 10 MG/ML
40 INJECTION INTRAMUSCULAR; INTRAVENOUS ONCE
Status: COMPLETED | OUTPATIENT
Start: 2020-11-23 | End: 2020-11-23

## 2020-11-23 RX ORDER — LABETALOL HYDROCHLORIDE 5 MG/ML
20 INJECTION, SOLUTION INTRAVENOUS ONCE
Status: CANCELLED | OUTPATIENT
Start: 2020-11-23 | End: 2020-11-23

## 2020-11-23 RX ADMIN — METOPROLOL TARTRATE 12.5 MG: 25 TABLET, FILM COATED ORAL at 09:23

## 2020-11-23 RX ADMIN — TEMAZEPAM 15 MG: 15 CAPSULE ORAL at 20:05

## 2020-11-23 RX ADMIN — ASPIRIN 81 MG: 81 TABLET, COATED ORAL at 09:23

## 2020-11-23 RX ADMIN — PRASUGREL 10 MG: 10 TABLET, FILM COATED ORAL at 09:24

## 2020-11-23 RX ADMIN — SACUBITRIL AND VALSARTAN 1 TABLET: 24; 26 TABLET, FILM COATED ORAL at 11:41

## 2020-11-23 RX ADMIN — FUROSEMIDE 40 MG: 10 INJECTION, SOLUTION INTRAMUSCULAR; INTRAVENOUS at 09:24

## 2020-11-23 RX ADMIN — HYDROCODONE BITARTRATE AND ACETAMINOPHEN 1 TABLET: 5; 325 TABLET ORAL at 01:41

## 2020-11-23 RX ADMIN — SACUBITRIL AND VALSARTAN 1 TABLET: 24; 26 TABLET, FILM COATED ORAL at 20:05

## 2020-11-23 RX ADMIN — INSULIN LISPRO 2 UNITS: 100 INJECTION, SOLUTION INTRAVENOUS; SUBCUTANEOUS at 16:31

## 2020-11-23 RX ADMIN — METOPROLOL TARTRATE 12.5 MG: 25 TABLET, FILM COATED ORAL at 20:05

## 2020-11-23 RX ADMIN — INSULIN LISPRO 2 UNITS: 100 INJECTION, SOLUTION INTRAVENOUS; SUBCUTANEOUS at 07:37

## 2020-11-23 RX ADMIN — INSULIN LISPRO 2 UNITS: 100 INJECTION, SOLUTION INTRAVENOUS; SUBCUTANEOUS at 11:41

## 2020-11-23 RX ADMIN — ROSUVASTATIN CALCIUM 20 MG: 20 TABLET, FILM COATED ORAL at 20:05

## 2020-11-23 NOTE — CONSULTS
Provided phase II information along with the contact information for cardiac rehab here at [facility].

## 2020-11-23 NOTE — PROGRESS NOTES
"    Patient Name: Sylvain Amador  :1960  60 y.o.      Patient Care Team:  Alonso Granda MD as PCP - General (Family Medicine)  Arturo Huynh MD as Referring Physician (Vascular Surgery)  Chino Lopez MD as Consulting Physician (Hematology and Oncology)  Germania Peterson MD as Consulting Physician (Cardiology)    Chief Complaint: anterior STEMI    Interval History: s/p anterior STEMI with LAD stenting. No further CP or dyspnea.     Objective   Vital Signs  Temp:  [97.2 °F (36.2 °C)-98.8 °F (37.1 °C)] 98.8 °F (37.1 °C)  Heart Rate:  [58-79] 79  Resp:  [10-21] 16  BP: ()/(57-93) 129/81    Intake/Output Summary (Last 24 hours) at 2020 0911  Last data filed at 2020 0700  Gross per 24 hour   Intake 1596 ml   Output 2175 ml   Net -579 ml     Flowsheet Rows      First Filed Value   Admission Height  182.9 cm (72\") Documented at 2020 1047   Admission Weight  97.7 kg (215 lb 6.4 oz) Documented at 2020 1039          Physical Exam:   General Appearance:    Alert, cooperative, in no acute distress   Lungs:     Clear to auscultation.  Normal respiratory effort and rate.      Heart:    Regular rhythm and normal rate, normal S1 and S2, no murmurs, gallops or rubs.     Chest Wall:    No abnormalities observed   Abdomen:     Soft, nontender, positive bowel sounds.     Extremities:   no cyanosis, clubbing or edema.  No marked joint deformities.  Adequate musculoskeletal strength.  Radial site without hematoma     Results Review:    Results from last 7 days   Lab Units 20  0500   SODIUM mmol/L 134*   POTASSIUM mmol/L 4.5   CHLORIDE mmol/L 105   CO2 mmol/L 19.8*   BUN mg/dL 16   CREATININE mg/dL 1.13   GLUCOSE mg/dL 157*   CALCIUM mg/dL 8.6     Results from last 7 days   Lab Units 20  1323 20  1040   TROPONIN T ng/mL 1.820* <0.010     Results from last 7 days   Lab Units 20  0500   WBC 10*3/mm3 15.79*   HEMOGLOBIN g/dL 14.2   HEMATOCRIT % 41.5 "   PLATELETS 10*3/mm3 156     Results from last 7 days   Lab Units 11/22/20  1041   INR  1.03   APTT seconds 27.5     Results from last 7 days   Lab Units 11/22/20  1040   MAGNESIUM mg/dL 2.0     Results from last 7 days   Lab Units 11/23/20  0500   CHOLESTEROL mg/dL 173   TRIGLYCERIDES mg/dL 306*   HDL CHOL mg/dL 29*   LDL CHOL mg/dL 93               Medication Review:   aspirin, 81 mg, Oral, Daily  furosemide, 40 mg, Intravenous, Once  insulin lispro, 0-7 Units, Subcutaneous, TID AC  metoprolol tartrate, 12.5 mg, Oral, Q12H  prasugrel, 10 mg, Oral, Daily  rosuvastatin, 20 mg, Oral, Daily  sacubitril-valsartan, 1 tablet, Oral, Q12H         sodium chloride, 50 mL/hr, Last Rate: 50 mL/hr (11/22/20 1309)        Assessment/Plan   1. Anterior STEMI: Proximal and mid LAD stents on 11/22.   ASA and Effient, Metop 12. BID.   Prior hisotry of CAD with balloon to OM, PCI to RCA.   2. Ischemi CM, acute sysotlic CHF: EF 40%  Transition lopressor to toprol, start low dose entresto. Elevated LA pressures on echo, gentle diuresis.   3. DM  4. HLD  5. HTN  6. Insomnia- will add low dose ambien.   7. VT arrest- received one shock prior to revascularization. No intubation. Monitor on telemetry.     Tx to floor, likely d/c tomorrow.       Stefany Dawn MD  [facility]  11/23/20  09:11 EST

## 2020-11-23 NOTE — PLAN OF CARE
Goal Outcome Evaluation:  Plan of Care Reviewed With: patient  Progress: improving  Outcome Summary: vss. pt denies chest pain. urinating well on lasix. Possible discharge home tomorrow. R Radial site is c/d/i/s.

## 2020-11-24 ENCOUNTER — READMISSION MANAGEMENT (OUTPATIENT)
Dept: CALL CENTER | Facility: HOSPITAL | Age: 60
End: 2020-11-24

## 2020-11-24 VITALS
WEIGHT: 201 LBS | SYSTOLIC BLOOD PRESSURE: 98 MMHG | HEART RATE: 90 BPM | DIASTOLIC BLOOD PRESSURE: 71 MMHG | BODY MASS INDEX: 27.22 KG/M2 | RESPIRATION RATE: 16 BRPM | OXYGEN SATURATION: 92 % | HEIGHT: 72 IN | TEMPERATURE: 98.2 F

## 2020-11-24 LAB
GLUCOSE BLDC GLUCOMTR-MCNC: 169 MG/DL (ref 70–130)
QT INTERVAL: 371 MS

## 2020-11-24 PROCEDURE — 82962 GLUCOSE BLOOD TEST: CPT

## 2020-11-24 PROCEDURE — 93005 ELECTROCARDIOGRAM TRACING: CPT | Performed by: INTERNAL MEDICINE

## 2020-11-24 PROCEDURE — 99239 HOSP IP/OBS DSCHRG MGMT >30: CPT | Performed by: NURSE PRACTITIONER

## 2020-11-24 PROCEDURE — 93010 ELECTROCARDIOGRAM REPORT: CPT | Performed by: INTERNAL MEDICINE

## 2020-11-24 PROCEDURE — 63710000001 INSULIN LISPRO (HUMAN) PER 5 UNITS: Performed by: INTERNAL MEDICINE

## 2020-11-24 RX ORDER — ROSUVASTATIN CALCIUM 20 MG/1
20 TABLET, COATED ORAL DAILY
Qty: 30 TABLET | Refills: 4 | Status: SHIPPED | OUTPATIENT
Start: 2020-11-24 | End: 2021-01-19

## 2020-11-24 RX ORDER — PRASUGREL 10 MG/1
10 TABLET, FILM COATED ORAL DAILY
Qty: 30 TABLET | Refills: 11 | Status: SHIPPED | OUTPATIENT
Start: 2020-11-25 | End: 2021-10-28

## 2020-11-24 RX ADMIN — METOPROLOL TARTRATE 12.5 MG: 25 TABLET, FILM COATED ORAL at 08:24

## 2020-11-24 RX ADMIN — INSULIN LISPRO 2 UNITS: 100 INJECTION, SOLUTION INTRAVENOUS; SUBCUTANEOUS at 06:49

## 2020-11-24 RX ADMIN — SACUBITRIL AND VALSARTAN 1 TABLET: 24; 26 TABLET, FILM COATED ORAL at 08:24

## 2020-11-24 RX ADMIN — PRASUGREL 10 MG: 10 TABLET, FILM COATED ORAL at 08:24

## 2020-11-24 RX ADMIN — ASPIRIN 81 MG: 81 TABLET, COATED ORAL at 08:24

## 2020-11-24 NOTE — DISCHARGE SUMMARY
Patient Name: Sylvain Amador  :1960  60 y.o.    Date of Admit: 2020  Date of Discharge:  2020    Discharge Diagnosis:  Problems Addressed this Visit        Cardiovascular and Mediastinum    Coronary atherosclerosis of native coronary vessel    Relevant Medications    metoprolol tartrate (LOPRESSOR) 25 MG tablet    prasugrel (EFFIENT) 10 MG tablet (Start on 2020)    sacubitril-valsartan (ENTRESTO) 24-26 MG tablet    Other Relevant Orders    Ambulatory Referral to Cardiac Rehab    ST elevation myocardial infarction (STEMI) (CMS/[facility]) - Primary    Relevant Medications    metoprolol tartrate (LOPRESSOR) 25 MG tablet    prasugrel (EFFIENT) 10 MG tablet (Start on 2020)    sacubitril-valsartan (ENTRESTO) 24-26 MG tablet      Other Visit Diagnoses     Chest pain, unspecified type        History of coronary artery disease        History of hypertension        History of diabetes mellitus          Diagnoses       Codes Comments    ST elevation myocardial infarction (STEMI), unspecified artery (CMS/[facility])    -  Primary ICD-10-CM: I21.3  ICD-9-CM: 410.90     Chest pain, unspecified type     ICD-10-CM: R07.9  ICD-9-CM: 786.50     History of coronary artery disease     ICD-10-CM: Z86.79  ICD-9-CM: V12.59     History of hypertension     ICD-10-CM: Z86.79  ICD-9-CM: V12.59     History of diabetes mellitus     ICD-10-CM: Z86.39  ICD-9-CM: V12.29     Atherosclerosis of native coronary artery of native heart without angina pectoris     ICD-10-CM: I25.10  ICD-9-CM: 414.01         1. Anterior STEMI: Proximal and mid LAD stents on .   ASA and Effient, Metop 12. BID.   Prior hisotry of CAD with balloon to OM, PCI to RCA.   2. Ischemi CM, acute sysotlic CHF: EF 40%  Transition lopressor to toprol, start low dose entresto. Elevated LA pressures on echo, gentle diuresis.   3. DM  4. HLD  5. HTN  6. Insomnia- will add low dose ambien.   7. VT arrest- received one shock prior to revascularization. No  intubation. Monitor on telemetry.     Hospital Course:   The patient is a 60-year-old male with history of coronary disease and prior PTCA to the OM and inferior infarct with PCI to the RCA, diabetes mellitus, hyperlipidemia who presented to the hospital with severe central chest discomfort.  It radiated to the right arm and there was some affiliated nausea.  He was also noted to have anterior lateral ST elevation on EKG.  He was brought to the cardiac Cath Lab and was noted to have an occluded acutely proximal LAD and underwent angioplasty and stenting to the proximal to mid LAD with a 3.25 x 33 mm Xience Haley drug-eluting stent.  He also had a lesion in the mid to distal LAD that was stented with a 2.75 x 12 mm Xience Haley drug-eluting stent.  His ostial RCA upon engagement also had severe lesion and was stented with a 4.0 x 15 mm Xience Haley drug-eluting stent.  Prior to revascularization of the LAD he had an episode of ventricular tachycardia and received 1 shock.  He was given some amiodarone for rhythm management.  He has stayed in sinus.  He went to the CCU for short time and yesterday moved to a regular room.  He has been started on Lopressor and Entresto.  He received some gentle IV diuresis.  His EF was noted to be 40%.  Overall he is improved today.  I went over with him the need for dual antiplatelet therapy uninterrupted for 1 year he understands the risk of stent thrombosis.    Procedures Performed  Procedure(s):  Left Heart Cath  Percutaneous Coronary Intervention  Stent JARRED coronary  Coronary angiography  Cardioversion/Defibrillation       Consults     No orders found from 10/24/2020 to 11/23/2020.          Pertinent Test Results:   Results from last 7 days   Lab Units 11/23/20  0500 11/22/20  1040   SODIUM mmol/L 134* 134*   POTASSIUM mmol/L 4.5 4.4   CHLORIDE mmol/L 105 103   CO2 mmol/L 19.8* 18.1*   BUN mg/dL 16 20   CREATININE mg/dL 1.13 1.18   CALCIUM mg/dL 8.6 9.1   BILIRUBIN mg/dL  --   0.2   ALK PHOS U/L  --  59   ALT (SGPT) U/L  --  17   AST (SGOT) U/L  --  11   GLUCOSE mg/dL 157* 176*     Results from last 7 days   Lab Units 11/22/20  1323 11/22/20  1040   TROPONIN T ng/mL 1.820* <0.010     @LABRCNT(bnp)@  Results from last 7 days   Lab Units 11/23/20  0500   WBC 10*3/mm3 15.79*   HEMOGLOBIN g/dL 14.2   HEMATOCRIT % 41.5   PLATELETS 10*3/mm3 156     Results from last 7 days   Lab Units 11/22/20  1041   INR  1.03   APTT seconds 27.5     Results from last 7 days   Lab Units 11/22/20  1040   MAGNESIUM mg/dL 2.0     Results from last 7 days   Lab Units 11/23/20  0500   CHOLESTEROL mg/dL 173   TRIGLYCERIDES mg/dL 306*   HDL CHOL mg/dL 29*   LDL CHOL mg/dL 93       Condition on Discharge: stable    Discharge Medications     Discharge Medications      New Medications      Instructions Start Date   metoprolol tartrate 25 MG tablet  Commonly known as: LOPRESSOR   12.5 mg, Oral, Every 12 Hours Scheduled      prasugrel 10 MG tablet  Commonly known as: EFFIENT   10 mg, Oral, Daily   Start Date: November 25, 2020     sacubitril-valsartan 24-26 MG tablet  Commonly known as: ENTRESTO   1 tablet, Oral, Every 12 Hours Scheduled         Changes to Medications      Instructions Start Date   rosuvastatin 20 MG tablet  Commonly known as: CRESTOR  What changed:   · medication strength  · how much to take   20 mg, Oral, Daily         Continue These Medications      Instructions Start Date   aspirin 81 MG tablet   81 mg, Oral, Daily      metFORMIN 500 MG tablet  Commonly known as: GLUCOPHAGE   1,000 mg, Oral, 2 Times Daily With Meals         Stop These Medications    lisinopril 5 MG tablet  Commonly known as: PRINIVIL,ZESTRIL            Discharge Diet:     Activity at Discharge:     Discharge disposition: home    Follow-up Appointments  Future Appointments   Date Time Provider Department Center   12/7/2020  9:30 AM Sabrina Gonsalez APRN [facility] None   1/7/2021  9:40 AM Germania Peterson MD [facility] None    1/12/2021 10:00 AM Germania Peterson MD [facility] None   4/5/2021 10:45 AM Alonso Granda MD [facility]     Additional Instructions for the Follow-ups that You Need to Schedule     Ambulatory Referral to Cardiac Rehab   As directed            Test Results Pending at Discharge       BELKIS Schwartz, [facility]  11/24/20  11:00 EST    Time: Discharge 45 min  Electronically signed by BELKIS Schwartz, 11/24/20, 11:00 AM EST.

## 2020-11-24 NOTE — DISCHARGE INSTRUCTIONS
Routine post cardiac catheterization/PCI discharge home care instructions:    1. No submerging procedure site below water for 7-10 days.  2. No lifting objects greater than 1 lbs for 3 days.  3. If groin site used, avoid climbing several flights of stairs or sitting for longer than 2 hours at a time for the next 24 hours.   4. Monitor puncture site for bleeding and/or knots;. If bleeding should occur at the groin site: lie flat, apply pressure and return to the ER. If bleeding should occur at the wrist site, apply pressure and return to the ER.  5.  You may apply a DRY Band-Aid over the puncture site if needed. Do not apply any lotions, salves or ointments to site.  6. No driving for 3 days.  7. Return to ER for recurrent symptoms.  8. No smoking.  9. Take all medications as prescribed.

## 2020-11-24 NOTE — PLAN OF CARE
Problem: Adult Inpatient Plan of Care  Goal: Plan of Care Review  Outcome: Ongoing, Progressing  Flowsheets (Taken 11/24/2020 0501)  Progress: improving  Plan of Care Reviewed With: patient  Outcome Summary: VSS. No complaints of chest pain. R radial site CDI, soft.

## 2020-11-24 NOTE — PROGRESS NOTES
Case Management Discharge Note      Final Note: Home with spouse    Provided Post Acute Provider List?: N/A  N/A Provider List Comment: no need for list identified    Selected Continued Care - Discharged on 11/24/2020 Admission date: 11/22/2020 - Discharge disposition: Home or Self Care    Destination    No services have been selected for the patient.              Durable Medical Equipment    No services have been selected for the patient.              Dialysis/Infusion    No services have been selected for the patient.              Home Medical Care    No services have been selected for the patient.              Therapy    No services have been selected for the patient.              Community Resources    No services have been selected for the patient.                  Transportation Services  Private: Car    Final Discharge Disposition Code: 01 - home or self-care

## 2020-11-25 ENCOUNTER — TRANSITIONAL CARE MANAGEMENT TELEPHONE ENCOUNTER (OUTPATIENT)
Dept: CALL CENTER | Facility: HOSPITAL | Age: 60
End: 2020-11-25

## 2020-11-25 NOTE — OUTREACH NOTE
Prep Survey      Responses   [facility] patient discharged from?  [place]   Is LACE score < 7 ?  No   Eligibility  [facility]   Date of Admission  11/22/20   Date of Discharge  11/24/20   Discharge Disposition  Home or Self Care   Discharge diagnosis  Anterior STEMI,    Ischemi CM, acute sysotlic CHF,    cardiac cath    Does the patient have one of the following disease processes/diagnoses(primary or secondary)?  Acute MI (STEMI,NSTEMI)   Does the patient have Home health ordered?  No   Is there a DME ordered?  No   Prep survey completed?  Yes          Mary Ellen Horvath RN

## 2020-11-25 NOTE — OUTREACH NOTE
Call Center TCM Note      Responses   [facility] patient discharged from?  [place]   Does the patient have one of the following disease processes/diagnoses(primary or secondary)?  Acute MI (STEMI,NSTEMI)   TCM attempt successful?  No   Unsuccessful attempts  Attempt 1          Billie Knight LPN    11/25/2020, 09:58 EST

## 2020-11-25 NOTE — OUTREACH NOTE
Call Center TCM Note      Responses   [facility] patient discharged from?  [place]   Does the patient have one of the following disease processes/diagnoses(primary or secondary)?  Acute MI (STEMI,NSTEMI)   TCM attempt successful?  No   Unsuccessful attempts  Attempt 2          Lubna Cho LPN    11/25/2020, 15:29 EST

## 2020-11-27 ENCOUNTER — TRANSITIONAL CARE MANAGEMENT TELEPHONE ENCOUNTER (OUTPATIENT)
Dept: CALL CENTER | Facility: HOSPITAL | Age: 60
End: 2020-11-27

## 2020-11-27 NOTE — OUTREACH NOTE
Call Center TCM Note      Responses   [facility] patient discharged from?  [place]   Does the patient have one of the following disease processes/diagnoses(primary or secondary)?  Acute MI (STEMI,NSTEMI)   TCM attempt successful?  No   Unsuccessful attempts  Attempt 3          Lubna Cho LPN    11/27/2020, 11:05 EST

## 2020-11-30 ENCOUNTER — TELEPHONE (OUTPATIENT)
Dept: CARDIOLOGY | Facility: CLINIC | Age: 60
End: 2020-11-30

## 2020-11-30 NOTE — TELEPHONE ENCOUNTER
Received a fax from [facility] pharmacy stating that pt needed a PA for Entresto 24-26    PA completed through cover my meds    PA has been approved until 11/25/2021    Approval letter faxed to [facility] pharmacy at [phone]  And sent to scanning

## 2020-12-02 ENCOUNTER — READMISSION MANAGEMENT (OUTPATIENT)
Dept: CALL CENTER | Facility: HOSPITAL | Age: 60
End: 2020-12-02

## 2020-12-02 NOTE — OUTREACH NOTE
AMI Week 2 Survey      Responses   [facility] [place] patient discharged from?  [place]   Does the patient have one of the following disease processes/diagnoses(primary or secondary)?  Acute MI (STEMI,NSTEMI)   Week 2 attempt successful?  Yes   Call start time  1705   Call end time  1710   Discharge diagnosis  Anterior STEMI,    Ischemi CM, acute sysotlic CHF,    cardiac cath    Person spoke with today (if not patient) and relationship  wife   Meds reviewed with patient/caregiver?  Yes   Is the patient having any side effects they believe may be caused by any medication additions or changes?  No   Does the patient have all prescriptions related to this admission filled (includes statins,anticoagulants,HTN meds,anti-arrhythmia meds)  Yes   Is the patient taking all medications as directed (includes completed medication regime)?  Yes   Does the patient have a primary care provider?   Yes   Does the patient have an appointment with their PCP,cardiologist,or clinic within 7 days of discharge?  No   Comments regarding PCP  Dr. Granda   What is preventing the patient from scheduling follow up appointments within 7 days of discharge?  Unsure of when or with whom   Nursing Interventions  Advised patient to make appointment   Has the patient kept scheduled appointments due by today?  N/A   Comments  Patient will have a conference call on Monday   Psychosocial issues?  No   Did the patient receive a copy of their discharge instructions?  Yes   Nursing interventions  Reviewed instructions with patient   What is the patient's perception of their health status since discharge?  Improving   Is the pateint /caregiver able to teach back the importance of cardiac rehab?  Yes   Is the patient/caregiver able to teach back lifestyle changes to help prevent MIs  Quit smoking   Is the patient/caregiver able to teach back ways to prevent a second heart attack:  Take medications, Follow up with MD   Is the patient/caregiver able to teach  back the hierarchy of who to call/visit for symptoms/problems? PCP, Specialist, Home health nurse, Urgent Care, ED, 911  Yes   Week 2 call completed?  Yes   Wrap up additional comments  Patient reports he is doing well at this time.  He denies any needs.           Yessenia Victoria RN

## 2020-12-07 ENCOUNTER — OFFICE VISIT (OUTPATIENT)
Dept: CARDIOLOGY | Facility: CLINIC | Age: 60
End: 2020-12-07

## 2020-12-07 ENCOUNTER — TELEPHONE (OUTPATIENT)
Dept: CARDIOLOGY | Facility: CLINIC | Age: 60
End: 2020-12-07

## 2020-12-07 VITALS — BODY MASS INDEX: 27.09 KG/M2 | HEIGHT: 72 IN | WEIGHT: 200 LBS

## 2020-12-07 DIAGNOSIS — F17.210 CIGARETTE SMOKER: ICD-10-CM

## 2020-12-07 DIAGNOSIS — I10 ESSENTIAL HYPERTENSION: ICD-10-CM

## 2020-12-07 DIAGNOSIS — I73.9 PAD (PERIPHERAL ARTERY DISEASE) (HCC): ICD-10-CM

## 2020-12-07 DIAGNOSIS — I71.40 ABDOMINAL AORTIC ANEURYSM (AAA) WITHOUT RUPTURE (HCC): ICD-10-CM

## 2020-12-07 DIAGNOSIS — E78.2 MIXED HYPERLIPIDEMIA: ICD-10-CM

## 2020-12-07 DIAGNOSIS — I25.10 ATHEROSCLEROSIS OF NATIVE CORONARY ARTERY OF NATIVE HEART WITHOUT ANGINA PECTORIS: Primary | ICD-10-CM

## 2020-12-07 DIAGNOSIS — I21.02 ST ELEVATION MYOCARDIAL INFARCTION INVOLVING LEFT ANTERIOR DESCENDING (LAD) CORONARY ARTERY (HCC): ICD-10-CM

## 2020-12-07 PROCEDURE — 99442 PR PHYS/QHP TELEPHONE EVALUATION 11-20 MIN: CPT | Performed by: NURSE PRACTITIONER

## 2020-12-07 NOTE — TELEPHONE ENCOUNTER
Please see if patient would qualify for patient assistance with Entresto or the monthly savings card.  Entresto is currently not affordable to him and I would like for him to receive him samples from the office.    Also, please give him a monthly savings card for Effient and samples.  Thank you

## 2020-12-07 NOTE — PROGRESS NOTES
Telehealth Visit    Date of Visit: 2020  Encounter Provider: BELKIS Campbell  Place of Service: [facility] CARDIOLOGY  Patient Name: Sylvain Amador  :1960  Primary Cardiologist: Dr. Germania Peterson    Chief Complaint   Patient presents with   • Coronary Artery Disease   • Follow-up     Hospital   :     Dear Dr. Alonso Granda,       HPI: Sylvain Amador is a pleasant 60 y.o. male who is an established patient of our practice. Due to COVID-19 virus, I am conducting a telehealth visit via video with patient and he has consented to this visit today.      In 2014, he was diagnosed with coronary artery disease and underwent drug-eluting stent placement to the mid RCA and balloon angioplasty to the first obtuse marginal branch.    He has a known abdominal aortic aneurysm and peripheral arterial disease followed by Dr. Herbert Huynh.  He has been a longtime cigarette smoker.    In 2020, he followed up with me in the office.  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  I ordered the test but he was a no-show possibly due to COVID-19.  When I saw him in the office he had stopped taking his aspirin and I have recommended that he restart aspirin 81 mg daily. He has been intolerant in the past atorvastatin and we recommend pravastatin.    In 2020, he had a Lexiscan Myoview stress test completed in our office which showed no ischemia and impression consistent with a low risk study.    In 2020, he presented to [facility] ED with severe central chest discomfort radiating to his right arm with accompanying nausea.  He was diagnosed with a STEMI and taken directly to the cardiac catheterization lab.  He was noted to have an acute occluded proximal LAD and underwent drug-eluting stent placement to the proximal to mid LAD and mid to distal LAD.  His ostial RCA also had severe lesion and was stented.   Prior to the revascularization of the LAD had an episode of ventricular tachycardia and received 1 shock.  He was given amiodarone and went to CCU.  His EF was 40% and he was started on Entresto and dual antiplatelet therapy.  His lisinopril was discontinued.  He was discharged from the hospital 11/24/2020.    Today is his hospital follow-up audio visit.  He explained that his chest pain he might have made at morning and he went to the emergency department.  He denies any further chest pain since then.  His Effient is expensive, but affordable.  The Entresto is not affordable as is, cost him close to $600 per month.  We will look into a savings plan or sample.  He denies chest pain, shortness of breath, palpitations, edema, dizziness, syncope, or bleeding.    Past Medical History:   Diagnosis Date   • CAD (coronary artery disease)    • Diabetes mellitus (CMS/[facility])    • GERD (gastroesophageal reflux disease)    • Hyperlipidemia    • Hypertension    • Intermittent claudication (CMS/[facility])    • PAD (peripheral artery disease) (CMS/[facility])     of lower extremities   • Recovering alcoholic (CMS/[facility])    • STEMI (ST elevation myocardial infarction) (CMS/[facility]) 11/2020       Past Surgical History:   Procedure Laterality Date   • ARTERIOGRAM AORTIC Left 8/2/2018    Procedure: AORTIC AND LEFT ILIAC ARTERY ANEURYSM REPAIR;  Surgeon: Arturo Huynh MD;  Location: [facility] OR;  Service: Vascular   • CARDIAC CATHETERIZATION  11/2015   • CARDIAC CATHETERIZATION N/A 11/22/2020    Procedure: Left Heart Cath;  Surgeon: Suraj Vaughn MD;  Location: [facility] CATH INVASIVE LOCATION;  Service: Cardiovascular;  Laterality: N/A;   • CARDIAC CATHETERIZATION N/A 11/22/2020    Procedure: Percutaneous Coronary Intervention;  Surgeon: Suraj Vaughn MD;  Location: [facility] CATH INVASIVE LOCATION;  Service: Cardiovascular;  Laterality: N/A;   • CARDIAC CATHETERIZATION N/A 11/22/2020    Procedure: Stent JARRED coronary;  Surgeon:  Suraj Vaughn MD;  Location:  [facility] CATH INVASIVE LOCATION;  Service: Cardiovascular;  Laterality: N/A;   • CARDIAC CATHETERIZATION N/A 11/22/2020    Procedure: Coronary angiography;  Surgeon: Suraj Vaughn MD;  Location:  [facility] CATH INVASIVE LOCATION;  Service: Cardiovascular;  Laterality: N/A;   • CARDIAC ELECTROPHYSIOLOGY PROCEDURE N/A 11/22/2020    Procedure: Cardioversion/Defibrillation;  Surgeon: Suraj Vaughn MD;  Location:  [facility] CATH INVASIVE LOCATION;  Service: Cardiovascular;  Laterality: N/A;   • CORONARY ANGIOPLASTY WITH STENT PLACEMENT  2015   • RHINOPLASTY  1975   • VASECTOMY         Social History     Socioeconomic History   • Marital status:      Spouse name: Not on file   • Number of children: Not on file   • Years of education: Not on file   • Highest education level: Not on file   Occupational History     Employer: [employer]   Tobacco Use   • Smoking status: Current Every Day Smoker     Packs/day: 0.50   • Smokeless tobacco: Current User   Substance and Sexual Activity   • Alcohol use: Yes     Comment: Seldom/ Caffeine use: 8-10 cups daily   • Drug use: No   • Sexual activity: Defer       Family History   Problem Relation Age of Onset   • Diabetes Mother    • Hypertension Mother    • Pancreatic cancer Mother    • Cancer Mother    • Esophageal cancer Father    • Cancer Father    • Esophageal cancer Brother    • Cancer Brother    • Cancer Paternal Grandmother    • Diabetes Paternal Grandmother    • Alcohol abuse Brother    • Cancer Brother        The following portion of the patient's history were reviewed and updated as appropriate: past medical history, past surgical history, past social history, past family history, allergies, current medications, and problem list.    Review of Systems   Constitution: Negative.   Cardiovascular: Negative.    Respiratory: Negative.    Hematologic/Lymphatic: Negative.    Neurological: Negative.   "      Allergies   Allergen Reactions   • No Known Drug Allergy          Current Outpatient Medications:   •  aspirin 81 MG tablet, Take 1 tablet by mouth Daily., Disp: 30 tablet, Rfl: 0  •  metFORMIN (GLUCOPHAGE) 500 MG tablet, Take 2 tablets by mouth 2 (Two) Times a Day With Meals., Disp: 360 tablet, Rfl: 3  •  metoprolol tartrate (LOPRESSOR) 25 MG tablet, Take 0.5 tablets by mouth Every 12 (Twelve) Hours., Disp: 30 tablet, Rfl: 11  •  prasugrel (EFFIENT) 10 MG tablet, Take 1 tablet by mouth Daily., Disp: 30 tablet, Rfl: 11  •  rosuvastatin (CRESTOR) 20 MG tablet, Take 1 tablet by mouth Daily., Disp: 30 tablet, Rfl: 4  •  sacubitril-valsartan (ENTRESTO) 24-26 MG tablet, Take 1 tablet by mouth Every 12 (Twelve) Hours., Disp: 60 tablet, Rfl: 11        Objective:     Vitals:    12/07/20 0928   Weight: 90.7 kg (200 lb)   Height: 182.9 cm (72\")     Body mass index is 27.12 kg/m².    Due to telehealth visit, there was no EKG, vitals, or weight performed in our office.  Vitals/Weight were reported by the patient and conducted at home.       Assessment:       Diagnosis Plan   1. Atherosclerosis of native coronary artery of native heart without angina pectoris     2. ST elevation myocardial infarction involving left anterior descending (LAD) coronary artery (CMS/HCC)     3. Essential hypertension     4. Mixed hyperlipidemia     5. Cigarette smoker     6. PAD (peripheral artery disease) (CMS/HCC)     7. Abdominal aortic aneurysm (AAA) without rupture (CMS/HCC)            Plan:       1/2.  Coronary Artery Disease: He recently had a STEMI and an episode of ventricular tachycardia with induced shock.  He underwent drug-eluting stent placement x2 to the LAD and stent to the right coronary artery.  He denies further anginal symptoms.  We discussed the importance of continuing with aspirin, prasugrel, rosuvastatin, and metoprolol.  I encouraged him to walk until he can start cardiac rehab.    3.  Hypertension: Recommended goal " less than 120/80.  Blood pressure will be checked on his next office visit.    4.  Hyperlipidemia: On 11/23/2020 total cholesterol 173, triglycerides 306, HDL 29, and LDL 93.  Recommended LDL goal less than 70 and triglycerides less than 150.  He is now on rosuvastatin.    5.  Cigarette Smoker: He would highly benefit from abstaining from cigarette smoking.    6/7.  Peripheral Arterial Disease and Abdominal Aortic Aneurysm: We appreciate Dr. Herbert Huynh managing his PAD.    8.  He is scheduled to follow-up with Dr. Germania Peterson in January 2021.  My medical assistant, Gaby will work on helping him with medication affordability and/or samples.    This patient has consented to a telehealth visit via audio. The visit was scheduled as a audio visit to comply with patient safety concerns in accordance with CDC recommendations.  All vitals recorded within this visit are reported by the patient.  I spent  25 minutes in total including but not limited to the 14 minutes spent in direct conversation with this patient.      As always, it has been a pleasure to participate in your patient's care. Thank you.       Sincerely,         BELKIS Schmitz        Dictated utilizing Dragon dictation

## 2020-12-08 ENCOUNTER — READMISSION MANAGEMENT (OUTPATIENT)
Dept: CALL CENTER | Facility: HOSPITAL | Age: 60
End: 2020-12-08

## 2020-12-08 NOTE — OUTREACH NOTE
AMI Week 3 Survey      Responses   [facility] patient discharged from?  [place]   Does the patient have one of the following disease processes/diagnoses(primary or secondary)?  Acute MI (STEMI,NSTEMI)   Week 3 attempt successful?  No   Unsuccessful attempts  Attempt 1          Shayy Campos RN

## 2020-12-11 ENCOUNTER — READMISSION MANAGEMENT (OUTPATIENT)
Dept: CALL CENTER | Facility: HOSPITAL | Age: 60
End: 2020-12-11

## 2020-12-11 NOTE — OUTREACH NOTE
AMI Week 3 Survey      Responses   [facility] patient discharged from?  [place]   Does the patient have one of the following disease processes/diagnoses(primary or secondary)?  Acute MI (STEMI,NSTEMI)   Week 3 attempt successful?  No   Unsuccessful attempts  Attempt 2          Yessenia Victoria RN

## 2020-12-18 ENCOUNTER — TELEPHONE (OUTPATIENT)
Dept: CARDIAC REHAB | Facility: HOSPITAL | Age: 60
End: 2020-12-18

## 2021-01-04 ENCOUNTER — OFFICE VISIT (OUTPATIENT)
Dept: CARDIOLOGY | Facility: CLINIC | Age: 61
End: 2021-01-04

## 2021-01-04 VITALS
SYSTOLIC BLOOD PRESSURE: 144 MMHG | HEIGHT: 72 IN | DIASTOLIC BLOOD PRESSURE: 80 MMHG | BODY MASS INDEX: 28.09 KG/M2 | HEART RATE: 72 BPM | WEIGHT: 207.4 LBS

## 2021-01-04 DIAGNOSIS — I10 ESSENTIAL HYPERTENSION: ICD-10-CM

## 2021-01-04 DIAGNOSIS — Z02.89 ENCOUNTER FOR EXAMINATION REQUIRED BY DEPARTMENT OF TRANSPORTATION (DOT): ICD-10-CM

## 2021-01-04 DIAGNOSIS — E78.2 MIXED HYPERLIPIDEMIA: ICD-10-CM

## 2021-01-04 DIAGNOSIS — I73.9 PAD (PERIPHERAL ARTERY DISEASE) (HCC): ICD-10-CM

## 2021-01-04 DIAGNOSIS — F17.210 CIGARETTE SMOKER: ICD-10-CM

## 2021-01-04 DIAGNOSIS — I25.10 ATHEROSCLEROSIS OF NATIVE CORONARY ARTERY OF NATIVE HEART WITHOUT ANGINA PECTORIS: Primary | ICD-10-CM

## 2021-01-04 DIAGNOSIS — I25.5 ISCHEMIC CARDIOMYOPATHY: ICD-10-CM

## 2021-01-04 PROCEDURE — 99214 OFFICE O/P EST MOD 30 MIN: CPT | Performed by: NURSE PRACTITIONER

## 2021-01-04 PROCEDURE — 93000 ELECTROCARDIOGRAM COMPLETE: CPT | Performed by: NURSE PRACTITIONER

## 2021-01-04 NOTE — PROGRESS NOTES
Chief Complaint  Coronary Artery Disease and Follow-up (DOT Testing)    Subjective          Sylvain Amador presents to [facility] CARDIOLOGY for cardiac follow up and he wants to return to work. 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     And October 2014, he was diagnosed with coronary artery disease and underwent drug-eluting stent placement to the mid RCA and balloon angioplasty to the first obtuse marginal branch.  He has a known abdominal aortic aneurysm and PAD followed by Dr. Herbert Huynh.  He has been a longtime cigarette smoker.  He has hyperlipidemia and has been intolerant to previous statins.    In January 2020, he followed up with me in the office and was stable at that time.  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  He never had the testing completed.  In October 2020, he had a Lexiscan Myoview stress test which showed no evidence of ischemia.    In November 2020, he presented to the [facility] ED with severe midsternal chest pain radiating to his right arm with nausea.  He was diagnosed with a STEMI and underwent drug-eluting stent placement to the proximal to mid LAD and mid to distal LAD, and ostial RCA.  Prior to the revascularization of the LAD he had ventricular tachycardia and received 1 shock.  His ejection fraction was 40%.  His lisinopril was discontinued and he was started on Entresto.  In the beginning of December 2020, I conducted a telehealth visit with him.  He reported one episode of chest pain and we decided to continue to monitor.    He denies any further chest pain.  His blood pressure is elevated today and he has not had his morning medications.  He denies shortness of air, palpitations, edema, syncope, or bleeding.  We have been sampling him Entresto and he has pain for the Effient.  Both medications have been expensive in the past.      Objective   Vital Signs:   BP  "144/80 (BP Location: Right arm)   Pulse 72   Ht 182.9 cm (72\")   Wt 94.1 kg (207 lb 6.4 oz)   BMI 28.13 kg/m²     Physical Exam  Constitutional:       Appearance: Normal appearance. He is normal weight.   Eyes:      Comments: Glasses     Cardiovascular:      Rate and Rhythm: Normal rate and regular rhythm.      Heart sounds: Normal heart sounds.   Pulmonary:      Effort: Pulmonary effort is normal.      Breath sounds: Normal breath sounds.   Musculoskeletal: Normal range of motion.   Neurological:      Mental Status: He is alert.   Psychiatric:         Mood and Affect: Mood normal.         Behavior: Behavior normal.         Thought Content: Thought content normal.         Judgment: Judgment normal.        Result Review :      previous labs and cardiac testing were reviewed.       ECG 12 Lead    Date/Time: 1/4/2021 11:32 AM  Performed by: Sabrina Gonsalez APRN  Authorized by: Sabrina Gonsalez APRN   Comparison: compared with previous ECG from 11/24/2020  Similar to previous ECG  Rhythm: sinus rhythm  Rate: normal  BPM: 72  Q waves: V1, V2 and V3    ST Segments: ST segments normal  T inversion: V1-V6  QRS axis: normal  Other: no other findings    Clinical impression: abnormal EKG              Assessment and Plan    Problem List Items Addressed This Visit        Other    Coronary atherosclerosis of native coronary vessel - Primary    Relevant Orders    Adult Transthoracic Echo Complete W/ Cont if Necessary Per Protocol    Stress Test With Myocardial Perfusion One Day    Hyperlipidemia    Hypertension    PAD (peripheral artery disease) (CMS/[facility])    Overview     of lower extremities         Cigarette smoker      Other Visit Diagnoses     Ischemic cardiomyopathy        Relevant Orders    Adult Transthoracic Echo Complete W/ Cont if Necessary Per Protocol    Stress Test With Myocardial Perfusion One Day    Encounter for examination required by Department of Transportation (DOT)            1/2.  Coronary Artery " Disease: He has a remote history of CAD.  In November 2020, he had a STEMI and underwent drug-eluting stent placement x2 to the LAD and stent to the right coronary artery.  He denies further anginal symptoms.  We discussed the importance of continuing with aspirin, prasugrel, rosuvastatin, and metoprolol.      3.  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  His DOT clearance requires another Lexiscan stress test and I have ordered this.  I was going to order a plain treadmill, but he is unable to walk on the treadmill due to PAD and claudication.    4.  Ischemic Cardiomyopathy: LVEF 40%.  NYHA class I.  Continue metoprolol and sacubitril/valsartan.  Reassess LVEF 90 days post infarct which will be after 2/8/2021.  Echocardiogram has been ordered.     5.  Hypertension: Recommended BP goal less than 120/80.  Blood pressure elevated today and he states that he has not had his morning medications.    6.  Hyperlipidemia: On 11/23/2020 total cholesterol 173, triglycerides 306, HDL 29, and LDL 93.  Recommended LDL goal less than 70 and triglycerides less than 150. He is now on rosuvastatin and plans to follow-up with his PCP for repeat lipid check.     7.  Cigarette Smoker: He would highly benefit from abstaining from cigarette smoking.     8/9.  Peripheral Arterial Disease and Abdominal Aortic Aneurysm: We appreciate Dr. Herbert Huynh managing his PAD.     10.  Samples of sacubitril/valsartan provided.  Refills were sent to his pharmacy.  Risk factor modification discussed.  Ordered Lexiscan stress test and echocardiogram.  He will have blood work completed at his PCP office.  Follow-up with Dr. Peterson in 3 to 4 months, unless otherwise needed sooner.    Follow Up   Return in about 3 months (around 4/4/2021) for Dr. Germania Peterson.  Patient was given instructions and counseling regarding his condition or for health maintenance advice. Please see specific information pulled into  the AVS if appropriate.

## 2021-01-08 ENCOUNTER — TRANSCRIBE ORDERS (OUTPATIENT)
Dept: SLEEP MEDICINE | Facility: HOSPITAL | Age: 61
End: 2021-01-08

## 2021-01-08 DIAGNOSIS — Z01.818 OTHER SPECIFIED PRE-OPERATIVE EXAMINATION: Primary | ICD-10-CM

## 2021-01-11 ENCOUNTER — LAB (OUTPATIENT)
Dept: LAB | Facility: HOSPITAL | Age: 61
End: 2021-01-11

## 2021-01-11 DIAGNOSIS — Z01.818 OTHER SPECIFIED PRE-OPERATIVE EXAMINATION: ICD-10-CM

## 2021-01-11 PROCEDURE — C9803 HOPD COVID-19 SPEC COLLECT: HCPCS

## 2021-01-11 PROCEDURE — U0004 COV-19 TEST NON-CDC HGH THRU: HCPCS

## 2021-01-12 LAB — SARS-COV-2 RNA RESP QL NAA+PROBE: NOT DETECTED

## 2021-01-13 ENCOUNTER — HOSPITAL ENCOUNTER (OUTPATIENT)
Dept: CARDIOLOGY | Facility: HOSPITAL | Age: 61
Discharge: HOME | End: 2021-01-13
Admitting: NURSE PRACTITIONER

## 2021-01-13 VITALS — BODY MASS INDEX: 28.04 KG/M2 | WEIGHT: 207 LBS | HEIGHT: 72 IN

## 2021-01-13 DIAGNOSIS — I25.10 ATHEROSCLEROSIS OF NATIVE CORONARY ARTERY OF NATIVE HEART WITHOUT ANGINA PECTORIS: ICD-10-CM

## 2021-01-13 DIAGNOSIS — I25.5 ISCHEMIC CARDIOMYOPATHY: ICD-10-CM

## 2021-01-13 DIAGNOSIS — I25.5 ISCHEMIC CARDIOMYOPATHY: Primary | ICD-10-CM

## 2021-01-13 LAB
BH CV NUCLEAR PRIOR STUDY: 1
BH CV STRESS BP STAGE 1: NORMAL
BH CV STRESS COMMENTS STAGE 1: NORMAL
BH CV STRESS DOSE REGADENOSON STAGE 1: 0.4
BH CV STRESS DURATION MIN STAGE 1: 0
BH CV STRESS DURATION SEC STAGE 1: 10
BH CV STRESS HR STAGE 1: 97
BH CV STRESS PROTOCOL 1: NORMAL
BH CV STRESS RECOVERY BP: NORMAL MMHG
BH CV STRESS RECOVERY HR: 84 BPM
BH CV STRESS STAGE 1: 1
LV EF NUC BP: 27 %
MAXIMAL PREDICTED HEART RATE: 158 BPM
PERCENT MAX PREDICTED HR: 61.39 %
STRESS BASELINE BP: NORMAL MMHG
STRESS BASELINE HR: 78 BPM
STRESS PERCENT HR: 72 %
STRESS POST EXERCISE DUR SEC: 10 SEC
STRESS POST PEAK BP: NORMAL MMHG
STRESS POST PEAK HR: 97 BPM
STRESS TARGET HR: 134 BPM

## 2021-01-13 PROCEDURE — A9502 TC99M TETROFOSMIN: HCPCS | Performed by: NURSE PRACTITIONER

## 2021-01-13 PROCEDURE — 93018 CV STRESS TEST I&R ONLY: CPT | Performed by: INTERNAL MEDICINE

## 2021-01-13 PROCEDURE — 0 TECHNETIUM TETROFOSMIN KIT: Performed by: NURSE PRACTITIONER

## 2021-01-13 PROCEDURE — 93016 CV STRESS TEST SUPVJ ONLY: CPT | Performed by: INTERNAL MEDICINE

## 2021-01-13 PROCEDURE — 93017 CV STRESS TEST TRACING ONLY: CPT

## 2021-01-13 PROCEDURE — 25010000002 REGADENOSON 0.4 MG/5ML SOLUTION: Performed by: NURSE PRACTITIONER

## 2021-01-13 PROCEDURE — 78452 HT MUSCLE IMAGE SPECT MULT: CPT

## 2021-01-13 PROCEDURE — 78452 HT MUSCLE IMAGE SPECT MULT: CPT | Performed by: INTERNAL MEDICINE

## 2021-01-13 RX ADMIN — REGADENOSON 0.4 MG: 0.08 INJECTION, SOLUTION INTRAVENOUS at 08:29

## 2021-01-13 RX ADMIN — TETROFOSMIN 1 DOSE: 1.38 INJECTION, POWDER, LYOPHILIZED, FOR SOLUTION INTRAVENOUS at 07:47

## 2021-01-13 RX ADMIN — TETROFOSMIN 1 DOSE: 1.38 INJECTION, POWDER, LYOPHILIZED, FOR SOLUTION INTRAVENOUS at 08:29

## 2021-01-19 RX ORDER — ROSUVASTATIN CALCIUM 20 MG/1
TABLET, COATED ORAL
Qty: 90 TABLET | Refills: 1 | Status: SHIPPED | OUTPATIENT
Start: 2021-01-19 | End: 2021-01-21 | Stop reason: SDUPTHER

## 2021-01-21 DIAGNOSIS — E78.2 MIXED HYPERLIPIDEMIA: Primary | ICD-10-CM

## 2021-01-21 DIAGNOSIS — I25.10 ATHEROSCLEROSIS OF NATIVE CORONARY ARTERY OF NATIVE HEART WITHOUT ANGINA PECTORIS: ICD-10-CM

## 2021-01-21 DIAGNOSIS — E11.59 TYPE 2 DIABETES MELLITUS WITH OTHER CIRCULATORY COMPLICATION, WITHOUT LONG-TERM CURRENT USE OF INSULIN (HCC): ICD-10-CM

## 2021-01-21 RX ORDER — ROSUVASTATIN CALCIUM 40 MG/1
40 TABLET, COATED ORAL DAILY
Qty: 90 TABLET | Refills: 3 | Status: SHIPPED | OUTPATIENT
Start: 2021-01-21 | End: 2021-09-09 | Stop reason: SDUPTHER

## 2021-01-25 ENCOUNTER — TELEPHONE (OUTPATIENT)
Dept: CARDIOLOGY | Facility: CLINIC | Age: 61
End: 2021-01-25

## 2021-01-25 NOTE — TELEPHONE ENCOUNTER
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  Pt saw you 1/4/2021 and completed Nuclear Stress on 1/13/2021.  ECHO is scheduled 2/9/2021.  Please advise.

## 2021-01-25 NOTE — TELEPHONE ENCOUNTER
Patient is due to have an echocardiogram because there were changes on his stress test.  I just left a message for patient to return my call so we can discuss.    I have CCed Dr. Peterson on this call as well to obtain her advice.

## 2021-01-26 NOTE — TELEPHONE ENCOUNTER
Dr. Ly said she will see patient after his echo at 12:30pm tomorrow.  Can you please add on the schedule.  I informed pt.   Perla

## 2021-01-26 NOTE — TELEPHONE ENCOUNTER
Shailesh Chauhan called and needs you to return his call before 1 pm today. He would like to speak to you directly regarding the letter he needs to return to work.

## 2021-01-26 NOTE — TELEPHONE ENCOUNTER
I spoke to patient via telephone.  I explained that he needs to have the echocardiogram completed before we decide if he can drive because a stress test was abnormal.  He would like to have the echocardiogram moved to a closer date.    Ketty-please see if we can schedule the echocardiogram to be completed this week.  Thank you

## 2021-01-27 ENCOUNTER — OFFICE VISIT (OUTPATIENT)
Dept: CARDIOLOGY | Facility: CLINIC | Age: 61
End: 2021-01-27

## 2021-01-27 ENCOUNTER — HOSPITAL ENCOUNTER (OUTPATIENT)
Dept: CARDIOLOGY | Facility: HOSPITAL | Age: 61
Discharge: HOME | End: 2021-01-27
Admitting: NURSE PRACTITIONER

## 2021-01-27 VITALS
WEIGHT: 207 LBS | HEIGHT: 72 IN | BODY MASS INDEX: 28.04 KG/M2 | DIASTOLIC BLOOD PRESSURE: 78 MMHG | HEART RATE: 64 BPM | SYSTOLIC BLOOD PRESSURE: 138 MMHG

## 2021-01-27 VITALS
DIASTOLIC BLOOD PRESSURE: 70 MMHG | HEIGHT: 72 IN | SYSTOLIC BLOOD PRESSURE: 130 MMHG | BODY MASS INDEX: 28.39 KG/M2 | WEIGHT: 209.6 LBS | HEART RATE: 70 BPM

## 2021-01-27 DIAGNOSIS — I25.5 ISCHEMIC CARDIOMYOPATHY: ICD-10-CM

## 2021-01-27 DIAGNOSIS — I10 ESSENTIAL HYPERTENSION: ICD-10-CM

## 2021-01-27 DIAGNOSIS — E78.2 MIXED HYPERLIPIDEMIA: ICD-10-CM

## 2021-01-27 DIAGNOSIS — I71.40 ABDOMINAL AORTIC ANEURYSM (AAA) WITHOUT RUPTURE (HCC): ICD-10-CM

## 2021-01-27 DIAGNOSIS — F17.210 CIGARETTE SMOKER: ICD-10-CM

## 2021-01-27 DIAGNOSIS — I25.10 ATHEROSCLEROSIS OF NATIVE CORONARY ARTERY OF NATIVE HEART WITHOUT ANGINA PECTORIS: Primary | ICD-10-CM

## 2021-01-27 DIAGNOSIS — R94.39 ABNORMAL NUCLEAR STRESS TEST: ICD-10-CM

## 2021-01-27 DIAGNOSIS — R47.81 SLURRED SPEECH: ICD-10-CM

## 2021-01-27 DIAGNOSIS — I25.10 ATHEROSCLEROSIS OF NATIVE CORONARY ARTERY OF NATIVE HEART WITHOUT ANGINA PECTORIS: ICD-10-CM

## 2021-01-27 DIAGNOSIS — E11.59 TYPE 2 DIABETES MELLITUS WITH OTHER CIRCULATORY COMPLICATION, WITHOUT LONG-TERM CURRENT USE OF INSULIN (HCC): ICD-10-CM

## 2021-01-27 PROCEDURE — 93356 MYOCRD STRAIN IMG SPCKL TRCK: CPT

## 2021-01-27 PROCEDURE — 99215 OFFICE O/P EST HI 40 MIN: CPT | Performed by: INTERNAL MEDICINE

## 2021-01-27 PROCEDURE — 93356 MYOCRD STRAIN IMG SPCKL TRCK: CPT | Performed by: INTERNAL MEDICINE

## 2021-01-27 PROCEDURE — 25010000002 PERFLUTREN (DEFINITY) 8.476 MG IN SODIUM CHLORIDE (PF) 0.9 % 10 ML INJECTION: Performed by: NURSE PRACTITIONER

## 2021-01-27 PROCEDURE — 93000 ELECTROCARDIOGRAM COMPLETE: CPT | Performed by: INTERNAL MEDICINE

## 2021-01-27 PROCEDURE — 93306 TTE W/DOPPLER COMPLETE: CPT

## 2021-01-27 PROCEDURE — 93306 TTE W/DOPPLER COMPLETE: CPT | Performed by: INTERNAL MEDICINE

## 2021-01-27 RX ORDER — CARVEDILOL 3.12 MG/1
3.12 TABLET ORAL 2 TIMES DAILY
Qty: 180 TABLET | Refills: 3 | Status: SHIPPED | OUTPATIENT
Start: 2021-01-27 | End: 2021-03-18

## 2021-01-27 RX ORDER — SACUBITRIL AND VALSARTAN 49; 51 MG/1; MG/1
1 TABLET, FILM COATED ORAL 2 TIMES DAILY
Qty: 180 TABLET | Refills: 3 | Status: SHIPPED | OUTPATIENT
Start: 2021-01-27 | End: 2021-12-20 | Stop reason: DRUGHIGH

## 2021-01-27 RX ADMIN — PERFLUTREN 1 ML: 6.52 INJECTION, SUSPENSION INTRAVENOUS at 13:00

## 2021-01-27 NOTE — PROGRESS NOTES
Date of Office Visit: 2021  Encounter Provider: Germania Peterson MD  Place of Service: [facility] CARDIOLOGY  Patient Name: Sylvain Amador  :1960      Patient ID:  Sylvain Amador is a 60 y.o. male is here for  followup for CAD and ischemic cardiomyopathy        History of Present Illness    He was seen in 10/2014 for chest pain after he had made an emergency department visit. When I saw him, I was concerned this was  coronary ischemia, so we set him up for a stress nuclear perfusion study done 10/24/2014,  showing a small infarct with a moderate amount of anuradha-infarct ischemia in the inferior  wall. Ejection fraction was 46%. He also had complaints of lower extremity cramping with  walking. He would have to stop and rest before he would be able to go on. He had a lower  extremity arterial duplex study done 10/24/2014, showing a severe obstructive disease  involving the left and right common femoral and superficial femoral artery systems.      On 10/28/2014, he underwent cardiac catheterization, which showed 40% diffuse  ack-re-psbhii LAD stenosis, 90% discrete OM-1 stenosis, 90% discrete mid-RCA stenosis, 40%  distal RCA stenosis of the bifurcation. He had plain old balloon angioplasty at the first  obtuse marginal branch, and he received a 3.5 x 15 mm Xience Xpedition drug-eluting stent  to the mid-RCA. The OM-1 branch did not get a stent. His ejection fraction at cath was  normal.      He then on 2014 had a CTA of the abdomen and pelvis with lower extremity runoff.  This showed completely occlusion of the superficial femoral arteries bilaterally with  reconstitution of popliteal arteries via the profunda femoral collaterals. There was also  a fusiform infrarenal aortic aneurysm measuring 3.5 cm.      He went on to see Dr. Reyes because of the claudication in his legs, which he still  has.  Dr. Reyes recommended angiography which the patient had done on  01/23/2015.  This  showed atherosclerotic disease of the aorta which was moderate and diffuse.  SFA was  occluded on the right.  SFA was occluded on the left.  Mild disease in the common femoral  artery bilaterally.  Popliteal artery was normal; reconstitution with two vessel runoff to  both of his feet.  There was a mild infrarenal abdominal aortic aneurysm.  Dr. Reyes said  there was nothing more he could do and recommended that the patient go on to see vascular  surgery. He did see Dr. Arturo Huynh who said that he really  cannot do surgery outside of vein transplantation which does not last. He has elected to  really go after the lifestyle changes, and he is doing better.     He had lower extremity arterial duplex studies done on  05/11/2015. The right ankle brachial index showed several arterial occlusive disease with  severe digital ischemia of the right toe. The atrial occlusive disease in the right leg  was in the aortoiliac and femoral segments. The ankle brachial index on the left showed  moderate arterial occlusive disease with moderate digital ischemia and moderate occlusive  disease of the aortoiliac and femoral segments.      His wife has had breast cancer.     In November 2020, he presented to the [facility] ED with severe midsternal chest pain radiating to his right arm with nausea.  He was diagnosed with a STEMI and underwent drug-eluting stent placement to the proximal to mid LAD (3.25 x 33) and mid to distal LAD (2.75 x 12), and ostial RCA (4 x 15).  Prior to the revascularization of the LAD he had ventricular tachycardia and received 1 shock.  His ejection fraction was 40%.  His lisinopril was discontinued and he was started on Entresto.      He has stress study done 1/13/2021 which showed ejection fraction reduces 27% with a medium size infarct in the apex with moderate anuradha-infarct ischemia and this was a change from a stress study done 10/6/2020.  An echocardiogram done 1/27/2021 showing ejection  fraction 35% with apical akinesis and septal akinesis.  His diastolic function was normal.  There is no significant valve disease.    He is slurring his speech which is new.  He has no chest pain or pressure.  He has no tachycardia or dizziness.  His energy level is somewhat low but stable.  He is tolerating his medications well.  He is taking his medications as directed.  Continues to smoke half pack of cigarettes a day.    Past Medical History:   Diagnosis Date   • CAD (coronary artery disease)    • Diabetes mellitus (CMS/[facility])    • GERD (gastroesophageal reflux disease)    • Hyperlipidemia    • Hypertension    • Intermittent claudication (CMS/[facility])    • PAD (peripheral artery disease) (CMS/[facility])     of lower extremities   • Recovering alcoholic (CMS/[facility])    • STEMI (ST elevation myocardial infarction) (CMS/[facility]) 11/2020         Past Surgical History:   Procedure Laterality Date   • ARTERIOGRAM AORTIC Left 8/2/2018    Procedure: AORTIC AND LEFT ILIAC ARTERY ANEURYSM REPAIR;  Surgeon: Arturo Huynh MD;  Location: [facility] OR;  Service: Vascular   • CARDIAC CATHETERIZATION  11/2015   • CARDIAC CATHETERIZATION N/A 11/22/2020    Procedure: Left Heart Cath;  Surgeon: Suraj Vaughn MD;  Location: [facility] CATH INVASIVE LOCATION;  Service: Cardiovascular;  Laterality: N/A;   • CARDIAC CATHETERIZATION N/A 11/22/2020    Procedure: Percutaneous Coronary Intervention;  Surgeon: Suraj Vaughn MD;  Location: [facility] CATH INVASIVE LOCATION;  Service: Cardiovascular;  Laterality: N/A;   • CARDIAC CATHETERIZATION N/A 11/22/2020    Procedure: Stent JARRED coronary;  Surgeon: Suraj Vaughn MD;  Location: [facility] CATH INVASIVE LOCATION;  Service: Cardiovascular;  Laterality: N/A;   • CARDIAC CATHETERIZATION N/A 11/22/2020    Procedure: Coronary angiography;  Surgeon: Suraj Vaughn MD;  Location: [facility] CATH INVASIVE LOCATION;  Service: Cardiovascular;  Laterality: N/A;   • CARDIAC  ELECTROPHYSIOLOGY PROCEDURE N/A 11/22/2020    Procedure: Cardioversion/Defibrillation;  Surgeon: Suraj Vaughn MD;  Location: [facility] INVASIVE LOCATION;  Service: Cardiovascular;  Laterality: N/A;   • CORONARY ANGIOPLASTY WITH STENT PLACEMENT  2015   • RHINOPLASTY  1975   • VASECTOMY         Current Outpatient Medications on File Prior to Visit   Medication Sig Dispense Refill   • aspirin 81 MG tablet Take 1 tablet by mouth Daily. 30 tablet 0   • metFORMIN (GLUCOPHAGE) 500 MG tablet Take 2 tablets by mouth 2 (Two) Times a Day With Meals. 360 tablet 3   • prasugrel (EFFIENT) 10 MG tablet Take 1 tablet by mouth Daily. 30 tablet 11   • rosuvastatin (CRESTOR) 40 MG tablet Take 1 tablet by mouth Daily. 90 tablet 3   • [DISCONTINUED] metoprolol tartrate (LOPRESSOR) 25 MG tablet Take 0.5 tablets by mouth Every 12 (Twelve) Hours. 30 tablet 11   • [DISCONTINUED] sacubitril-valsartan (ENTRESTO) 24-26 MG tablet Take 1 tablet by mouth Every 12 (Twelve) Hours. 28 tablet 0     Current Facility-Administered Medications on File Prior to Visit   Medication Dose Route Frequency Provider Last Rate Last Admin   • [COMPLETED] perflutren (DEFINITY) 8.476 mg in Sodium Chloride (PF) 0.9 % 10 mL injection  1 mL Intravenous Once Sabrina Gonsalez APRN   1 mL at 01/27/21 1300       Social History     Socioeconomic History   • Marital status:      Spouse name: Not on file   • Number of children: Not on file   • Years of education: Not on file   • Highest education level: Not on file   Occupational History     Employer: [employer]   Tobacco Use   • Smoking status: Current Every Day Smoker     Packs/day: 0.50   • Smokeless tobacco: Current User   Substance and Sexual Activity   • Alcohol use: Yes     Comment: Seldom/ Caffeine use: 8-10 cups daily   • Drug use: No   • Sexual activity: Defer           ROS    Procedures    ECG 12 Lead    Date/Time: 1/27/2021 1:29 PM  Performed by: Germania Peterson  "MD  Authorized by: Germania Peterson MD   Comparison: compared with previous ECG   Similar to previous ECG  Rhythm: sinus rhythm  Q waves: V2, V3 and V4    T inversion: I, aVL, V2, V3, V4, V5 and V6    Clinical impression: abnormal EKG                Objective:      Vitals:    01/27/21 1307   BP: 130/70   BP Location: Left arm   Pulse: 70   Weight: 95.1 kg (209 lb 9.6 oz)   Height: 182.9 cm (72\")     Body mass index is 28.43 kg/m².    Vitals signs reviewed.   Constitutional:       General: Not in acute distress.     Appearance: Well-developed. Not diaphoretic.   Eyes:      General: No scleral icterus.     Conjunctiva/sclera: Conjunctivae normal.   HENT:      Head: Normocephalic and atraumatic.   Neck:      Musculoskeletal: Neck supple.      Thyroid: No thyromegaly.      Vascular: No carotid bruit or JVD.      Lymphadenopathy: No cervical adenopathy.   Pulmonary:      Effort: Pulmonary effort is normal. No respiratory distress.      Breath sounds: Normal breath sounds. No wheezing. No rhonchi. No rales.   Chest:      Chest wall: Not tender to palpatation.   Cardiovascular:      Normal rate. Regular rhythm.      Murmurs: There is no murmur.      No gallop.   Pulses:     Intact distal pulses.   Edema:     Peripheral edema absent.   Abdominal:      General: Bowel sounds are normal. There is no distension or abdominal bruit.      Palpations: Abdomen is soft. There is no abdominal mass.      Tenderness: There is no abdominal tenderness.   Musculoskeletal:         General: No deformity.      Extremities: No clubbing present.  Skin:     General: Skin is warm and dry. There is no cyanosis.      Coloration: Skin is not pale.      Findings: No rash.   Neurological:      Mental Status: Alert and oriented to person, place, and time.      Cranial Nerves: No cranial nerve deficit.   Psychiatric:         Judgment: Judgment normal.         Lab Review:       Assessment:      Diagnosis Plan   1. Atherosclerosis of native coronary " artery of native heart without angina pectoris  Case Request Cath Lab: Coronary angiography   2. Mixed hyperlipidemia     3. Essential hypertension     4. Abdominal aortic aneurysm (AAA) without rupture (CMS/[facility])     5. Type 2 diabetes mellitus with other circulatory complication, without long-term current use of insulin (CMS/[facility])     6. Cigarette smoker     7. Slurred speech  MRI brain w wo contrast   8. Abnormal nuclear stress test  Case Request Cath Lab: Coronary angiography   9. Ischemic cardiomyopathy  Case Request Cath Lab: Coronary angiography     1. CAD, s/p POBA to OM1 and PCI with stent to mid RCA, ostial RCA, proximal LAD and mid LAD, no angina.   Stress nuclear perfusion study in 1/21 shows apical ischemia.  Set up repeat cardiac catheterization.  I am concerned that the LAD stents are occluded.  2. Peripheral arterial disease. Occlusion of bilateral superficial femoral arteries. Sees Dr. Huynh.  Is treating this with lifestyle changes and is claudication has improved.   3. Smoking. He is smoking.   Advised cessation of smoking.  4. Hyperlipidemia, on Atorvastatin.   5. HTN, well controlled.   6.  Ischemic cardiomyopathy, ejection fraction 35%.  7.  New slurred speech, set up MRI of the brain looking for stroke     Plan:       Stop metoprolol tartrate in favor of carvedilol 3.125 mg twice daily given his ischemic cardiomyopathy.  Increase Entresto to 49/51 twice daily.  Set up repeat cardiac catheterization for ischemia noted on his stress examination.  Also set up MRI of the brain for new slurred speech.  Of concern is had a stroke.  He needs to be disabled.  He has multivessel coronary disease and ischemic cardiomyopathy.  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  He also have significant vascular disease.  We had a conversation about this today and have advised him to start the process for this.

## 2021-01-28 ENCOUNTER — TELEPHONE (OUTPATIENT)
Dept: CARDIOLOGY | Facility: CLINIC | Age: 61
End: 2021-01-28

## 2021-01-28 ENCOUNTER — TRANSCRIBE ORDERS (OUTPATIENT)
Dept: CARDIOLOGY | Facility: CLINIC | Age: 61
End: 2021-01-28

## 2021-01-28 DIAGNOSIS — Z13.6 SCREENING FOR ISCHEMIC HEART DISEASE: ICD-10-CM

## 2021-01-28 DIAGNOSIS — Z01.810 PRE-OPERATIVE CARDIOVASCULAR EXAMINATION: Primary | ICD-10-CM

## 2021-01-28 PROBLEM — I25.10 ATHEROSCLEROSIS OF NATIVE CORONARY ARTERY OF NATIVE HEART WITHOUT ANGINA PECTORIS: Status: ACTIVE | Noted: 2021-01-28

## 2021-01-28 PROBLEM — R94.39 ABNORMAL NUCLEAR STRESS TEST: Status: ACTIVE | Noted: 2021-01-28

## 2021-01-28 PROBLEM — I25.5 ISCHEMIC CARDIOMYOPATHY: Status: ACTIVE | Noted: 2021-01-28

## 2021-01-28 LAB
ASCENDING AORTA: 3.7 CM
BH CV ECHO MEAS - ACS: 2.4 CM
BH CV ECHO MEAS - AO MAX PG (FULL): 3.6 MMHG
BH CV ECHO MEAS - AO MAX PG: 6.4 MMHG
BH CV ECHO MEAS - AO MEAN PG (FULL): 1.7 MMHG
BH CV ECHO MEAS - AO MEAN PG: 3 MMHG
BH CV ECHO MEAS - AO ROOT AREA (BSA CORRECTED): 1.9
BH CV ECHO MEAS - AO ROOT AREA: 12.6 CM^2
BH CV ECHO MEAS - AO ROOT DIAM: 4 CM
BH CV ECHO MEAS - AO V2 MAX: 126.2 CM/SEC
BH CV ECHO MEAS - AO V2 MEAN: 78.6 CM/SEC
BH CV ECHO MEAS - AO V2 VTI: 26 CM
BH CV ECHO MEAS - ASC AORTA: 3.7 CM
BH CV ECHO MEAS - AVA(I,A): 2 CM^2
BH CV ECHO MEAS - AVA(I,D): 2 CM^2
BH CV ECHO MEAS - AVA(V,A): 2.3 CM^2
BH CV ECHO MEAS - AVA(V,D): 2.3 CM^2
BH CV ECHO MEAS - BSA(HAYCOCK): 2.2 M^2
BH CV ECHO MEAS - BSA: 2.2 M^2
BH CV ECHO MEAS - BZI_BMI: 28.1 KILOGRAMS/M^2
BH CV ECHO MEAS - BZI_METRIC_HEIGHT: 182.9 CM
BH CV ECHO MEAS - BZI_METRIC_WEIGHT: 93.9 KG
BH CV ECHO MEAS - CONTRAST EF (2CH): 42 CM2
BH CV ECHO MEAS - CONTRAST EF 4CH: 47 CM2
BH CV ECHO MEAS - EDV(MOD-SP2): 132 ML
BH CV ECHO MEAS - EDV(MOD-SP4): 156 ML
BH CV ECHO MEAS - EDV(TEICH): 169.3 ML
BH CV ECHO MEAS - EF(CUBED): 51.3 %
BH CV ECHO MEAS - EF(MOD-BP): 43.2 %
BH CV ECHO MEAS - EF(MOD-SP2): 41.7 %
BH CV ECHO MEAS - EF(MOD-SP4): 47.4 %
BH CV ECHO MEAS - EF(TEICH): 42.6 %
BH CV ECHO MEAS - EF_3D-VOL: 35 %
BH CV ECHO MEAS - ESV(MOD-SP2): 77 ML
BH CV ECHO MEAS - ESV(MOD-SP4): 82 ML
BH CV ECHO MEAS - ESV(TEICH): 97.2 ML
BH CV ECHO MEAS - FS: 21.3 %
BH CV ECHO MEAS - IVS/LVPW: 1.1
BH CV ECHO MEAS - IVSD: 0.85 CM
BH CV ECHO MEAS - LAT PEAK E' VEL: 9 CM/SEC
BH CV ECHO MEAS - LV DIASTOLIC VOL/BSA (35-75): 72.2 ML/M^2
BH CV ECHO MEAS - LV MASS(C)D: 184.2 GRAMS
BH CV ECHO MEAS - LV MASS(C)DI: 85.2 GRAMS/M^2
BH CV ECHO MEAS - LV MAX PG: 2.8 MMHG
BH CV ECHO MEAS - LV MEAN PG: 1.3 MMHG
BH CV ECHO MEAS - LV SYSTOLIC VOL/BSA (12-30): 37.9 ML/M^2
BH CV ECHO MEAS - LV V1 MAX: 83.9 CM/SEC
BH CV ECHO MEAS - LV V1 MEAN: 50.9 CM/SEC
BH CV ECHO MEAS - LV V1 VTI: 14.9 CM
BH CV ECHO MEAS - LVIDD: 5.8 CM
BH CV ECHO MEAS - LVIDS: 4.6 CM
BH CV ECHO MEAS - LVLD AP2: 8 CM
BH CV ECHO MEAS - LVLD AP4: 7.7 CM
BH CV ECHO MEAS - LVLS AP2: 7.5 CM
BH CV ECHO MEAS - LVLS AP4: 6.9 CM
BH CV ECHO MEAS - LVOT AREA (M): 3.5 CM^2
BH CV ECHO MEAS - LVOT AREA: 3.4 CM^2
BH CV ECHO MEAS - LVOT DIAM: 2.1 CM
BH CV ECHO MEAS - LVPWD: 0.8 CM
BH CV ECHO MEAS - MED PEAK E' VEL: 4.8 CM/SEC
BH CV ECHO MEAS - MR MAX PG: 22.8 MMHG
BH CV ECHO MEAS - MR MAX VEL: 238.9 CM/SEC
BH CV ECHO MEAS - MV A DUR: 0.14 SEC
BH CV ECHO MEAS - MV A MAX VEL: 51.8 CM/SEC
BH CV ECHO MEAS - MV DEC SLOPE: 290.7 CM/SEC^2
BH CV ECHO MEAS - MV DEC TIME: 0.13 SEC
BH CV ECHO MEAS - MV E MAX VEL: 55.9 CM/SEC
BH CV ECHO MEAS - MV E/A: 1.1
BH CV ECHO MEAS - MV MAX PG: 1.6 MMHG
BH CV ECHO MEAS - MV MEAN PG: 0.73 MMHG
BH CV ECHO MEAS - MV P1/2T MAX VEL: 64.3 CM/SEC
BH CV ECHO MEAS - MV P1/2T: 64.8 MSEC
BH CV ECHO MEAS - MV V2 MAX: 62.5 CM/SEC
BH CV ECHO MEAS - MV V2 MEAN: 40.1 CM/SEC
BH CV ECHO MEAS - MV V2 VTI: 23.6 CM
BH CV ECHO MEAS - MVA P1/2T LCG: 3.4 CM^2
BH CV ECHO MEAS - MVA(P1/2T): 3.4 CM^2
BH CV ECHO MEAS - MVA(VTI): 2.2 CM^2
BH CV ECHO MEAS - PA ACC TIME: 0.12 SEC
BH CV ECHO MEAS - PA MAX PG (FULL): 2.9 MMHG
BH CV ECHO MEAS - PA MAX PG: 4.4 MMHG
BH CV ECHO MEAS - PA PR(ACCEL): 25.1 MMHG
BH CV ECHO MEAS - PA V2 MAX: 104.5 CM/SEC
BH CV ECHO MEAS - PULM A REVS DUR: 0.13 SEC
BH CV ECHO MEAS - PULM A REVS VEL: 24 CM/SEC
BH CV ECHO MEAS - PULM DIAS VEL: 35.3 CM/SEC
BH CV ECHO MEAS - PULM S/D: 1.3
BH CV ECHO MEAS - PULM SYS VEL: 47 CM/SEC
BH CV ECHO MEAS - PVA(V,A): 2.8 CM^2
BH CV ECHO MEAS - PVA(V,D): 2.8 CM^2
BH CV ECHO MEAS - QP/QS: 1.2
BH CV ECHO MEAS - RAP SYSTOLE: 3 MMHG
BH CV ECHO MEAS - RV MAX PG: 1.4 MMHG
BH CV ECHO MEAS - RV MEAN PG: 0.78 MMHG
BH CV ECHO MEAS - RV V1 MAX: 60 CM/SEC
BH CV ECHO MEAS - RV V1 MEAN: 39.6 CM/SEC
BH CV ECHO MEAS - RV V1 VTI: 12.6 CM
BH CV ECHO MEAS - RVOT AREA: 4.8 CM^2
BH CV ECHO MEAS - RVOT DIAM: 2.5 CM
BH CV ECHO MEAS - RVSP: 27.1 MMHG
BH CV ECHO MEAS - SI(AO): 151.9 ML/M^2
BH CV ECHO MEAS - SI(CUBED): 47.2 ML/M^2
BH CV ECHO MEAS - SI(LVOT): 23.5 ML/M^2
BH CV ECHO MEAS - SI(MOD-SP2): 25.4 ML/M^2
BH CV ECHO MEAS - SI(MOD-SP4): 34.2 ML/M^2
BH CV ECHO MEAS - SI(TEICH): 33.3 ML/M^2
BH CV ECHO MEAS - SUP REN AO DIAM: 2.5 CM
BH CV ECHO MEAS - SV(AO): 328.4 ML
BH CV ECHO MEAS - SV(CUBED): 102.1 ML
BH CV ECHO MEAS - SV(LVOT): 50.9 ML
BH CV ECHO MEAS - SV(MOD-SP2): 55 ML
BH CV ECHO MEAS - SV(MOD-SP4): 74 ML
BH CV ECHO MEAS - SV(RVOT): 60.7 ML
BH CV ECHO MEAS - SV(TEICH): 72.1 ML
BH CV ECHO MEAS - TAPSE (>1.6): 2 CM
BH CV ECHO MEAS - TR MAX VEL: 245.7 CM/SEC
BH CV ECHO MEASUREMENTS AVERAGE E/E' RATIO: 8.1
BH CV XLRA - RV BASE: 3.4 CM
BH CV XLRA - RV LENGTH: 5.8 CM
BH CV XLRA - RV MID: 2.7 CM
BH CV XLRA - TDI S': 12 CM/SEC
LEFT ATRIUM VOLUME INDEX: 24 ML/M2
MAXIMAL PREDICTED HEART RATE: 158 BPM
SINUS: 3.8 CM
STJ: 3.2 CM
STRESS TARGET HR: 134 BPM

## 2021-02-01 ENCOUNTER — TRANSCRIBE ORDERS (OUTPATIENT)
Dept: ADMINISTRATIVE | Facility: HOSPITAL | Age: 61
End: 2021-02-01

## 2021-02-01 DIAGNOSIS — Z01.818 OTHER SPECIFIED PRE-OPERATIVE EXAMINATION: Primary | ICD-10-CM

## 2021-02-02 ENCOUNTER — LAB (OUTPATIENT)
Dept: LAB | Facility: HOSPITAL | Age: 61
End: 2021-02-02

## 2021-02-02 DIAGNOSIS — Z01.818 OTHER SPECIFIED PRE-OPERATIVE EXAMINATION: ICD-10-CM

## 2021-02-02 PROCEDURE — U0004 COV-19 TEST NON-CDC HGH THRU: HCPCS

## 2021-02-02 PROCEDURE — C9803 HOPD COVID-19 SPEC COLLECT: HCPCS

## 2021-02-03 ENCOUNTER — LAB (OUTPATIENT)
Dept: LAB | Facility: HOSPITAL | Age: 61
End: 2021-02-03

## 2021-02-03 ENCOUNTER — TELEPHONE (OUTPATIENT)
Dept: CARDIOLOGY | Facility: CLINIC | Age: 61
End: 2021-02-03

## 2021-02-03 DIAGNOSIS — Z13.6 SCREENING FOR ISCHEMIC HEART DISEASE: ICD-10-CM

## 2021-02-03 DIAGNOSIS — Z01.810 PRE-OPERATIVE CARDIOVASCULAR EXAMINATION: ICD-10-CM

## 2021-02-03 LAB
ANION GAP SERPL CALCULATED.3IONS-SCNC: 9.7 MMOL/L (ref 5–15)
BASOPHILS # BLD AUTO: 0.05 10*3/MM3 (ref 0–0.2)
BASOPHILS NFR BLD AUTO: 0.7 % (ref 0–1.5)
BUN SERPL-MCNC: 18 MG/DL (ref 8–23)
BUN/CREAT SERPL: 13.8 (ref 7–25)
CALCIUM SPEC-SCNC: 9.5 MG/DL (ref 8.6–10.5)
CHLORIDE SERPL-SCNC: 103 MMOL/L (ref 98–107)
CO2 SERPL-SCNC: 23.3 MMOL/L (ref 22–29)
CREAT SERPL-MCNC: 1.3 MG/DL (ref 0.76–1.27)
DEPRECATED RDW RBC AUTO: 47.5 FL (ref 37–54)
EOSINOPHIL # BLD AUTO: 0.08 10*3/MM3 (ref 0–0.4)
EOSINOPHIL NFR BLD AUTO: 1.2 % (ref 0.3–6.2)
ERYTHROCYTE [DISTWIDTH] IN BLOOD BY AUTOMATED COUNT: 14.1 % (ref 12.3–15.4)
GFR SERPL CREATININE-BSD FRML MDRD: 56 ML/MIN/1.73
GLUCOSE SERPL-MCNC: 250 MG/DL (ref 65–99)
HCT VFR BLD AUTO: 44.5 % (ref 37.5–51)
HGB BLD-MCNC: 14.3 G/DL (ref 13–17.7)
IMM GRANULOCYTES # BLD AUTO: 0.02 10*3/MM3 (ref 0–0.05)
IMM GRANULOCYTES NFR BLD AUTO: 0.3 % (ref 0–0.5)
LYMPHOCYTES # BLD AUTO: 1.77 10*3/MM3 (ref 0.7–3.1)
LYMPHOCYTES NFR BLD AUTO: 26.1 % (ref 19.6–45.3)
MCH RBC QN AUTO: 29.3 PG (ref 26.6–33)
MCHC RBC AUTO-ENTMCNC: 32.1 G/DL (ref 31.5–35.7)
MCV RBC AUTO: 91.2 FL (ref 79–97)
MONOCYTES # BLD AUTO: 0.51 10*3/MM3 (ref 0.1–0.9)
MONOCYTES NFR BLD AUTO: 7.5 % (ref 5–12)
NEUTROPHILS NFR BLD AUTO: 4.36 10*3/MM3 (ref 1.7–7)
NEUTROPHILS NFR BLD AUTO: 64.2 % (ref 42.7–76)
NRBC BLD AUTO-RTO: 0 /100 WBC (ref 0–0.2)
PLATELET # BLD AUTO: 162 10*3/MM3 (ref 140–450)
PMV BLD AUTO: 10.4 FL (ref 6–12)
POTASSIUM SERPL-SCNC: 4.8 MMOL/L (ref 3.5–5.2)
RBC # BLD AUTO: 4.88 10*6/MM3 (ref 4.14–5.8)
SARS-COV-2 RNA RESP QL NAA+PROBE: NOT DETECTED
SODIUM SERPL-SCNC: 136 MMOL/L (ref 136–145)
WBC # BLD AUTO: 6.79 10*3/MM3 (ref 3.4–10.8)

## 2021-02-03 PROCEDURE — 36415 COLL VENOUS BLD VENIPUNCTURE: CPT

## 2021-02-03 PROCEDURE — 85025 COMPLETE CBC W/AUTO DIFF WBC: CPT

## 2021-02-03 PROCEDURE — 80048 BASIC METABOLIC PNL TOTAL CA: CPT

## 2021-02-04 ENCOUNTER — HOSPITAL ENCOUNTER (OUTPATIENT)
Facility: HOSPITAL | Age: 61
Setting detail: HOSPITAL OUTPATIENT SURGERY
Discharge: HOME | End: 2021-02-04
Attending: INTERNAL MEDICINE | Admitting: INTERNAL MEDICINE

## 2021-02-04 VITALS
RESPIRATION RATE: 20 BRPM | OXYGEN SATURATION: 95 % | HEIGHT: 72 IN | BODY MASS INDEX: 27.77 KG/M2 | HEART RATE: 75 BPM | TEMPERATURE: 97.9 F | DIASTOLIC BLOOD PRESSURE: 74 MMHG | SYSTOLIC BLOOD PRESSURE: 121 MMHG | WEIGHT: 205 LBS

## 2021-02-04 DIAGNOSIS — I25.5 ISCHEMIC CARDIOMYOPATHY: ICD-10-CM

## 2021-02-04 DIAGNOSIS — I25.10 ATHEROSCLEROSIS OF NATIVE CORONARY ARTERY OF NATIVE HEART WITHOUT ANGINA PECTORIS: ICD-10-CM

## 2021-02-04 DIAGNOSIS — R94.39 ABNORMAL NUCLEAR STRESS TEST: ICD-10-CM

## 2021-02-04 LAB — GLUCOSE BLDC GLUCOMTR-MCNC: 138 MG/DL (ref 70–130)

## 2021-02-04 PROCEDURE — 25010000002 MIDAZOLAM PER 1 MG: Performed by: INTERNAL MEDICINE

## 2021-02-04 PROCEDURE — C1769 GUIDE WIRE: HCPCS | Performed by: INTERNAL MEDICINE

## 2021-02-04 PROCEDURE — C1894 INTRO/SHEATH, NON-LASER: HCPCS | Performed by: INTERNAL MEDICINE

## 2021-02-04 PROCEDURE — 0 IOPAMIDOL PER 1 ML: Performed by: INTERNAL MEDICINE

## 2021-02-04 PROCEDURE — 93458 L HRT ARTERY/VENTRICLE ANGIO: CPT | Performed by: INTERNAL MEDICINE

## 2021-02-04 PROCEDURE — 25010000002 FENTANYL CITRATE (PF) 100 MCG/2ML SOLUTION: Performed by: INTERNAL MEDICINE

## 2021-02-04 PROCEDURE — 82962 GLUCOSE BLOOD TEST: CPT

## 2021-02-04 PROCEDURE — 25010000002 HEPARIN (PORCINE) PER 1000 UNITS: Performed by: INTERNAL MEDICINE

## 2021-02-04 RX ORDER — LIDOCAINE HYDROCHLORIDE 10 MG/ML
0.1 INJECTION, SOLUTION EPIDURAL; INFILTRATION; INTRACAUDAL; PERINEURAL ONCE AS NEEDED
Status: DISCONTINUED | OUTPATIENT
Start: 2021-02-04 | End: 2021-02-04 | Stop reason: HOSPADM

## 2021-02-04 RX ORDER — SODIUM CHLORIDE 9 MG/ML
75 INJECTION, SOLUTION INTRAVENOUS CONTINUOUS
Status: DISCONTINUED | OUTPATIENT
Start: 2021-02-04 | End: 2021-02-04 | Stop reason: HOSPADM

## 2021-02-04 RX ORDER — ACETAMINOPHEN 325 MG/1
650 TABLET ORAL EVERY 4 HOURS PRN
Status: DISCONTINUED | OUTPATIENT
Start: 2021-02-04 | End: 2021-02-04 | Stop reason: HOSPADM

## 2021-02-04 RX ORDER — MIDAZOLAM HYDROCHLORIDE 1 MG/ML
INJECTION INTRAMUSCULAR; INTRAVENOUS AS NEEDED
Status: DISCONTINUED | OUTPATIENT
Start: 2021-02-04 | End: 2021-02-04 | Stop reason: HOSPADM

## 2021-02-04 RX ORDER — LIDOCAINE HYDROCHLORIDE 20 MG/ML
INJECTION, SOLUTION INFILTRATION; PERINEURAL AS NEEDED
Status: DISCONTINUED | OUTPATIENT
Start: 2021-02-04 | End: 2021-02-04 | Stop reason: HOSPADM

## 2021-02-04 RX ORDER — SODIUM CHLORIDE 0.9 % (FLUSH) 0.9 %
10 SYRINGE (ML) INJECTION AS NEEDED
Status: DISCONTINUED | OUTPATIENT
Start: 2021-02-04 | End: 2021-02-04 | Stop reason: HOSPADM

## 2021-02-04 RX ORDER — FENTANYL CITRATE 50 UG/ML
INJECTION, SOLUTION INTRAMUSCULAR; INTRAVENOUS AS NEEDED
Status: DISCONTINUED | OUTPATIENT
Start: 2021-02-04 | End: 2021-02-04 | Stop reason: HOSPADM

## 2021-02-04 RX ORDER — SODIUM CHLORIDE 0.9 % (FLUSH) 0.9 %
3 SYRINGE (ML) INJECTION EVERY 12 HOURS SCHEDULED
Status: DISCONTINUED | OUTPATIENT
Start: 2021-02-04 | End: 2021-02-04 | Stop reason: HOSPADM

## 2021-02-04 RX ADMIN — SODIUM CHLORIDE 75 ML/HR: 9 INJECTION, SOLUTION INTRAVENOUS at 07:28

## 2021-02-04 NOTE — DISCHARGE INSTRUCTIONS
Resume your Metformin in 48 hours      [facility]  [address] [place] [address]    Coronary Angiogram (Radial/Ulnar Approach) After Care    Refer to this sheet in the next few weeks. These instructions provide you with information on caring for yourself after your procedure. Your caregiver may also give you more specific instructions. Your treatment has been planned according to current medical practices, but problems sometimes occur. Call your caregiver if you have any problems or questions after your procedure.    Home Care Instructions:  · You may shower the day after the procedure. Remove the bandage (dressing) and gently wash the site with plain soap and water. Gently pat the site dry. You may apply a band aid daily for 2 days if desired.    · Do not apply powder or lotion to the site.  · Do not submerge the affected site in water for 3 to 5 days or until the site is completely healed.   · Do not lift, push or pull anything over 5 pounds for 5 days after your procedure. As a reference, a gallon of milk weighs 8 pounds.   · Inspect the site at least twice daily. You may notice some bruising at the site and it may be tender for 1 to 2 weeks.     · Increase your fluid intake for the next 2 days.    · Keep arm elevated for 24 hours. For the remainder of the day, keep your arm in “Pledge of Allegiance” position when up and about.     · You may drive 24 hours after the procedure unless otherwise instructed by your caregiver.  · Do not operate machinery or power tools for 24 hours.  · A responsible adult should be with you for the first 24 hours after you arrive home. Do not make any important legal decisions or sign legal papers for 24 hours.  Do not drink alcohol for 24 hours.    · Metformin or any medications containing Metformin should not be taken for 48 hours after your procedure.      Call Your Doctor if:   · You have unusual pain at the radial/ulnar (wrist) site.  · You have  redness, warmth, swelling, or pain at the radial/ulnar (wrist) site.  · You have drainage (other than a small amount of blood on the dressing).  · You have chills or a fever > 101.  · Your arm becomes pale or dark, cool, tingly, or numb.  · You have heavy bleeding from the site, hold pressure on the site for 20 minutes.  If the bleeding stops, apply a fresh bandage and call your cardiologist.  However, if you continue to have bleeding, call 911.

## 2021-02-10 ENCOUNTER — TELEPHONE (OUTPATIENT)
Dept: CARDIOLOGY | Facility: CLINIC | Age: 61
End: 2021-02-10

## 2021-02-10 NOTE — TELEPHONE ENCOUNTER
Mr. Amador called today frustrated about his disability paperwork. I saw in your note that you said he was disabled from a cardiac standpoint. He just had his heart cath done. He has an MRI of brain on 2/23.     Are you planning on writing a letter stating that he is disabled? I told him I would send this to Dr. Peterson.     Dr. Peterson please decide with Gaby and he would appreciate a phone call back from one of you. He would like a letter by next Wednesday. Thanks.

## 2021-02-11 ENCOUNTER — TELEPHONE (OUTPATIENT)
Dept: CARDIOLOGY | Facility: CLINIC | Age: 61
End: 2021-02-11

## 2021-02-11 NOTE — TELEPHONE ENCOUNTER
To whom it may concern:    Sylvain Amador is followed in my office for coronary artery disease and ischemic cardiomyopathy.  His disease is severe both for his coronary artery disease and his cardiomyopathy.  At this point, given the severity of his disease and his symptoms which include fatigue and chronic short windedness, I advised that he become disabled due to severe underlying cardiac disease.    He is starting this process for obtaining disability.  Please assist with this and feel free to contact me should he have any questions about his underlying cardiac pathology.    Sincerely,        Germania Peterson MD

## 2021-02-11 NOTE — TELEPHONE ENCOUNTER
I tried calling patient and he did not answer. I left a message for patient and I will call him later.

## 2021-02-11 NOTE — TELEPHONE ENCOUNTER
I have created this in to a letter under Letters tab    Can you please print the letter out and place in RM in box for her to sign

## 2021-02-11 NOTE — TELEPHONE ENCOUNTER
I sure can. Printed and Sabrina has called him to see if he would like it mailed or if he wants to pick it up. We are waiting on a call back.

## 2021-02-23 ENCOUNTER — HOSPITAL ENCOUNTER (OUTPATIENT)
Dept: MRI IMAGING | Facility: HOSPITAL | Age: 61
Discharge: HOME | End: 2021-02-23
Admitting: INTERNAL MEDICINE

## 2021-02-23 ENCOUNTER — TELEPHONE (OUTPATIENT)
Dept: CARDIOLOGY | Facility: CLINIC | Age: 61
End: 2021-02-23

## 2021-02-23 DIAGNOSIS — R47.81 SLURRED SPEECH: ICD-10-CM

## 2021-02-23 DIAGNOSIS — R93.0 ABNORMAL MRI OF HEAD: ICD-10-CM

## 2021-02-23 DIAGNOSIS — R47.81 SLURRED SPEECH: Primary | ICD-10-CM

## 2021-02-23 DIAGNOSIS — I65.22 CAROTID ARTERY STENOSIS, SYMPTOMATIC, LEFT: ICD-10-CM

## 2021-02-23 PROCEDURE — 0 GADOBENATE DIMEGLUMINE 529 MG/ML SOLUTION: Performed by: INTERNAL MEDICINE

## 2021-02-23 PROCEDURE — 70553 MRI BRAIN STEM W/O & W/DYE: CPT

## 2021-02-23 PROCEDURE — A9577 INJ MULTIHANCE: HCPCS | Performed by: INTERNAL MEDICINE

## 2021-02-23 RX ADMIN — GADOBENATE DIMEGLUMINE 19 ML: 529 INJECTION, SOLUTION INTRAVENOUS at 07:27

## 2021-02-23 NOTE — TELEPHONE ENCOUNTER
Called and left a message to call back about MRI results of the brain and needs a CTA of the head neck as well as probable anticoagulation for multiple strokes noted.

## 2021-02-24 ENCOUNTER — TRANSCRIBE ORDERS (OUTPATIENT)
Dept: CARDIOLOGY | Facility: CLINIC | Age: 61
End: 2021-02-24

## 2021-02-24 ENCOUNTER — TELEPHONE (OUTPATIENT)
Dept: CARDIOLOGY | Facility: CLINIC | Age: 61
End: 2021-02-24

## 2021-02-24 ENCOUNTER — HOSPITAL ENCOUNTER (OUTPATIENT)
Dept: CT IMAGING | Facility: HOSPITAL | Age: 61
Discharge: HOME | End: 2021-02-24
Admitting: INTERNAL MEDICINE

## 2021-02-24 DIAGNOSIS — Z01.810 PRE-OPERATIVE CARDIOVASCULAR EXAMINATION: Primary | ICD-10-CM

## 2021-02-24 DIAGNOSIS — I63.9 CEREBROVASCULAR ACCIDENT (CVA), UNSPECIFIED MECHANISM (HCC): Primary | ICD-10-CM

## 2021-02-24 DIAGNOSIS — I63.9 CEREBROVASCULAR ACCIDENT (CVA), UNSPECIFIED MECHANISM (HCC): ICD-10-CM

## 2021-02-24 DIAGNOSIS — I65.22 CAROTID ARTERY STENOSIS, SYMPTOMATIC, LEFT: ICD-10-CM

## 2021-02-24 DIAGNOSIS — Z13.6 SCREENING FOR ISCHEMIC HEART DISEASE: ICD-10-CM

## 2021-02-24 DIAGNOSIS — I65.22 CAROTID ARTERY STENOSIS, SYMPTOMATIC, LEFT: Primary | ICD-10-CM

## 2021-02-24 LAB — CREAT BLDA-MCNC: 1.3 MG/DL (ref 0.6–1.3)

## 2021-02-24 PROCEDURE — 25010000002 IOPAMIDOL 61 % SOLUTION: Performed by: INTERNAL MEDICINE

## 2021-02-24 PROCEDURE — 70496 CT ANGIOGRAPHY HEAD: CPT

## 2021-02-24 PROCEDURE — 82565 ASSAY OF CREATININE: CPT

## 2021-02-24 PROCEDURE — 70498 CT ANGIOGRAPHY NECK: CPT

## 2021-02-24 RX ADMIN — IOPAMIDOL 95 ML: 612 INJECTION, SOLUTION INTRAVENOUS at 09:46

## 2021-02-24 NOTE — TELEPHONE ENCOUNTER
Dr. Peterson,    Dr. Jackson needs to speak to you about this pt.    Call back #: [phone].    Thank you,    Karmen Amador, RN  Triage [facility]

## 2021-02-24 NOTE — TELEPHONE ENCOUNTER
Spoke with Dr. Jackson.  Called pt and gave results of CTA head and neck.  No med changes from now.       I need him to see neurology urgently - can you have the neurologist who will see him as an outpt call me on my mobile?  Referral in     Thx.     rM

## 2021-02-24 NOTE — TELEPHONE ENCOUNTER
Called and spoke with [facility]. Made him an appt for March 5th at 9:30.     Left message for Dr. Lopez to call you.    Notified pt of his appt date and time. He verbalized understanding.    Thank you,    Karmen Amador, RN  Triage [facility]

## 2021-02-24 NOTE — TELEPHONE ENCOUNTER
I spoke with the patient today.  His MRI brain reveals multiple old and subacute strokes.  There are multiple vascular distributions but there is also significant stenosis of his left carotid artery.  I am getting a stat CT of the head neck to evaluate left carotid artery but I am also concerned that he may need to go on anticoagulation.  I am not sure what to do because there is amyloid angiopathy noted in his brain which may induce hemorrhage.  Given this, I have placed a urgent referral to neurology to help me determine whether or not he should be on anticoagulants.  Right now he is on aspirin and prasugrel.  I discussed this plan of care with the patient.    Sending this is an FYI in case you receive any messages on him.

## 2021-03-01 ENCOUNTER — LAB (OUTPATIENT)
Dept: LAB | Facility: HOSPITAL | Age: 61
End: 2021-03-01

## 2021-03-01 ENCOUNTER — TRANSCRIBE ORDERS (OUTPATIENT)
Dept: SLEEP MEDICINE | Facility: HOSPITAL | Age: 61
End: 2021-03-01

## 2021-03-01 DIAGNOSIS — Z01.818 OTHER SPECIFIED PRE-OPERATIVE EXAMINATION: ICD-10-CM

## 2021-03-01 DIAGNOSIS — Z01.818 OTHER SPECIFIED PRE-OPERATIVE EXAMINATION: Primary | ICD-10-CM

## 2021-03-01 PROCEDURE — C9803 HOPD COVID-19 SPEC COLLECT: HCPCS

## 2021-03-01 PROCEDURE — U0004 COV-19 TEST NON-CDC HGH THRU: HCPCS

## 2021-03-02 LAB — SARS-COV-2 RNA RESP QL NAA+PROBE: NOT DETECTED

## 2021-03-03 ENCOUNTER — HOSPITAL ENCOUNTER (OUTPATIENT)
Dept: CARDIOLOGY | Facility: HOSPITAL | Age: 61
Discharge: HOME | End: 2021-03-03
Admitting: INTERNAL MEDICINE

## 2021-03-03 VITALS
HEART RATE: 75 BPM | SYSTOLIC BLOOD PRESSURE: 118 MMHG | DIASTOLIC BLOOD PRESSURE: 73 MMHG | RESPIRATION RATE: 18 BRPM | OXYGEN SATURATION: 93 %

## 2021-03-03 DIAGNOSIS — I63.9 CEREBROVASCULAR ACCIDENT (CVA), UNSPECIFIED MECHANISM (HCC): ICD-10-CM

## 2021-03-03 LAB
BH CV ECHO MEAS - BSA(HAYCOCK): 2.2 M^2
BH CV ECHO MEAS - BSA: 2.2 M^2
BH CV ECHO MEAS - BZI_BMI: 27.8 KILOGRAMS/M^2
BH CV ECHO MEAS - BZI_METRIC_HEIGHT: 182.9 CM
BH CV ECHO MEAS - BZI_METRIC_WEIGHT: 93 KG
BH CV VAS BP RIGHT ARM: NORMAL MMHG

## 2021-03-03 PROCEDURE — 25010000002 MIDAZOLAM PER 1 MG: Performed by: INTERNAL MEDICINE

## 2021-03-03 PROCEDURE — 93320 DOPPLER ECHO COMPLETE: CPT

## 2021-03-03 PROCEDURE — 93325 DOPPLER ECHO COLOR FLOW MAPG: CPT | Performed by: INTERNAL MEDICINE

## 2021-03-03 PROCEDURE — 99152 MOD SED SAME PHYS/QHP 5/>YRS: CPT

## 2021-03-03 PROCEDURE — 25010000002 FENTANYL CITRATE (PF) 100 MCG/2ML SOLUTION: Performed by: INTERNAL MEDICINE

## 2021-03-03 PROCEDURE — 93320 DOPPLER ECHO COMPLETE: CPT | Performed by: INTERNAL MEDICINE

## 2021-03-03 PROCEDURE — 93312 ECHO TRANSESOPHAGEAL: CPT | Performed by: INTERNAL MEDICINE

## 2021-03-03 PROCEDURE — 93325 DOPPLER ECHO COLOR FLOW MAPG: CPT

## 2021-03-03 PROCEDURE — 93312 ECHO TRANSESOPHAGEAL: CPT

## 2021-03-03 PROCEDURE — 99153 MOD SED SAME PHYS/QHP EA: CPT

## 2021-03-03 RX ORDER — MIDAZOLAM HYDROCHLORIDE 1 MG/ML
INJECTION INTRAMUSCULAR; INTRAVENOUS
Status: COMPLETED | OUTPATIENT
Start: 2021-03-03 | End: 2021-03-03

## 2021-03-03 RX ORDER — LIDOCAINE HYDROCHLORIDE 20 MG/ML
SOLUTION OROPHARYNGEAL
Status: COMPLETED | OUTPATIENT
Start: 2021-03-03 | End: 2021-03-03

## 2021-03-03 RX ORDER — FENTANYL CITRATE 50 UG/ML
INJECTION, SOLUTION INTRAMUSCULAR; INTRAVENOUS
Status: COMPLETED | OUTPATIENT
Start: 2021-03-03 | End: 2021-03-03

## 2021-03-03 RX ORDER — SODIUM CHLORIDE 9 MG/ML
INJECTION, SOLUTION INTRAVENOUS
Status: COMPLETED | OUTPATIENT
Start: 2021-03-03 | End: 2021-03-03

## 2021-03-03 RX ADMIN — MIDAZOLAM HYDROCHLORIDE 2 MG: 1 INJECTION, SOLUTION INTRAMUSCULAR; INTRAVENOUS at 08:32

## 2021-03-03 RX ADMIN — MIDAZOLAM HYDROCHLORIDE 2 MG: 1 INJECTION, SOLUTION INTRAMUSCULAR; INTRAVENOUS at 08:31

## 2021-03-03 RX ADMIN — FENTANYL CITRATE 25 MCG: 50 INJECTION, SOLUTION INTRAMUSCULAR; INTRAVENOUS at 08:28

## 2021-03-03 RX ADMIN — FENTANYL CITRATE 25 MCG: 50 INJECTION, SOLUTION INTRAMUSCULAR; INTRAVENOUS at 08:35

## 2021-03-03 RX ADMIN — LIDOCAINE HYDROCHLORIDE 10 ML: 20 SOLUTION ORAL; TOPICAL at 08:18

## 2021-03-03 RX ADMIN — FENTANYL CITRATE 25 MCG: 50 INJECTION, SOLUTION INTRAMUSCULAR; INTRAVENOUS at 08:31

## 2021-03-03 RX ADMIN — SODIUM CHLORIDE 100 ML/HR: 9 INJECTION, SOLUTION INTRAVENOUS at 08:18

## 2021-03-03 RX ADMIN — MIDAZOLAM HYDROCHLORIDE 2 MG: 1 INJECTION, SOLUTION INTRAMUSCULAR; INTRAVENOUS at 08:34

## 2021-03-03 RX ADMIN — MIDAZOLAM HYDROCHLORIDE 2 MG: 1 INJECTION, SOLUTION INTRAMUSCULAR; INTRAVENOUS at 08:28

## 2021-03-04 ENCOUNTER — TELEPHONE (OUTPATIENT)
Dept: CARDIOLOGY | Facility: CLINIC | Age: 61
End: 2021-03-04

## 2021-03-05 ENCOUNTER — OFFICE VISIT (OUTPATIENT)
Dept: NEUROLOGY | Facility: CLINIC | Age: 61
End: 2021-03-05

## 2021-03-05 VITALS
WEIGHT: 205 LBS | HEART RATE: 86 BPM | OXYGEN SATURATION: 98 % | HEIGHT: 72 IN | SYSTOLIC BLOOD PRESSURE: 118 MMHG | DIASTOLIC BLOOD PRESSURE: 82 MMHG | BODY MASS INDEX: 27.77 KG/M2

## 2021-03-05 DIAGNOSIS — I63.40 CEREBROVASCULAR ACCIDENT (CVA) DUE TO EMBOLISM OF CEREBRAL ARTERY (HCC): Primary | ICD-10-CM

## 2021-03-05 PROBLEM — I63.9 EMBOLIC STROKE: Status: ACTIVE | Noted: 2021-03-05

## 2021-03-05 PROCEDURE — 99205 OFFICE O/P NEW HI 60 MIN: CPT | Performed by: PSYCHIATRY & NEUROLOGY

## 2021-03-05 NOTE — PATIENT INSTRUCTIONS
Lower blood pressure by restricting salt in diet (less than 2400 mg/day), weight loss, increase fruits, vegetables, whole grains, and low-fat dairy products.    Consider the DASH diet or Mediterranean diet.  Increase fish and poultry intake.    Avoid sodas, sweets, and red meat.    Limit alcohol consumption.    Monitor and keep blood pressure, cholesterol and blood sugar at goal.    Avoid the use of tobacco and avoid secondhand smoke.    Engage in moderate to vigorous intensity aerobic exercise for about 40 minutes 3-4 times per week.  Consider lower intensity david chi or yoga if necessary.    Take antiplatelet medication regularly.    If you snore, wake up unrefreshed or feel tired during the day, talk to your doctor as these may be signs of sleep apnea and a sleep study may be indicated.

## 2021-03-05 NOTE — ASSESSMENT & PLAN NOTE
60 year old man with significant history of cardiac disease and atherosclerotic disease who presents to neurology clinic for initial evaluation and treatment of stroke and concerns for cardioembolic etiology.  He has history of heart attacks with the previous one in 2014 and then had a STEMI in 11/2020 for which he had cardiac cath and 2 stents placed most recently.  When he was being evaluated by his cardiologist (Dr. Peterson) he informed her of trouble with his speech and slurring of his speech and some trouble with dexterity which made her concerned for possible stroke.   She ordered a brain MRI scan which demonstrated multiple old infarcts in different vascular distributions concerning for central or possible cardiac embolic source.   It also demonstrated old microhemorrhages due to possible underlying amyloid angiopathy.  Furthermore his CTA demonstrated complete occlusion of the left ICA.  I discussed these findings with patient and his cardiologist over the phone at the time of the MRI findings and again today with patient on his visit.  He has been on combination of aspirin and prasugrel for the cardiac stents.  He has not had any headaches that he reports on visit today.  He had MATEUS done more recently which demonstrated significant atherosclerotic disease and plaque burden in the ascending aorta with low EF.  This is likely the etiology of his strokes which I discussed with patient and his cardiologist today. He was referred to me for evaluation and consideration for starting anticoagulation.   I reviewed the brain MRI images on his visit today and agree with the findings as described by radiology.  He is on dual antiplatelet therapy currently along with Crestor.  Given the significant plaque burden in ascending aorta and concern for mobile components that can lead to future strokes I discussed with his cardiologist and patient about starting Xarelto which can be reversed in case of intracranial  hemorrhage.  Also discussed discontinuing the baby aspirin and continuing the prasugrel to keep the cardiac stents patent and prevent another MI.  Discussed the risks and benefits of this treatment with patient extensively and discussed our mutual decision made by myself and his cardiologist on visit today with patient at length.  He is agreeable to this treatment decision.  Advised patient to be taken to the ED with any new stroke symptoms or new onset severe headache as this could potentially indicate intracranial hemorrhage for which he would need to be urgently treated.  Provided patient education information on stroke and answered all of his questions today along with discussing decision with Dr. Peterson over the phone.  Will follow up in 3 months and sooner if needed.

## 2021-03-05 NOTE — TELEPHONE ENCOUNTER
I spoke with him today about the MATEUS when he was at the neurologist - start xarelto 20mg daily and stopped asa.  Will stop effient in 6 weeks and restart asa at that time.  When is his next appt?

## 2021-03-05 NOTE — PROGRESS NOTES
Chief Complaint  Stroke (began Nov 2020 when he had a heart attack, had recent MRI 2/23 and CTA 2/24- Cardiologist would like gudiance on anticoagulation)    Subjective     {CC  Problem List  Visit Diagnosis   Encounters  Notes  Medications  Labs  Result Review Imaging  Media :23}     Sylvain Amador presents to [facility] NEUROLOGY for   HISTORY OF PRESENT ILLNESS:    Sylvain Amador is 60 year old man with significant history of cardiac disease and atherosclerotic disease who presents to neurology clinic for initial evaluation and treatment of stroke and concerns for cardioembolic etiology.  He has history of heart attacks with the previous one in 2014 and then had a STEMI in 11/2020 for which he had cardiac cath and 2 stents placed most recently.  When he was being evaluated by his cardiologist (Dr. Peterson) he informed her of trouble with his speech and slurring of his speech and some trouble with dexterity which made her concerned for possible stroke.   She ordered a brain MRI scan which demonstrated multiple old infarcts in different vascular distributions concerning for central or possible cardiac embolic source.   It also demonstrated old microhemorrhages due to possible underlying amyloid angiopathy.  Furthermore his CTA demonstrated complete occlusion of the left ICA.  I discussed these findings with patient and his cardiologist over the phone at the time of the MRI findings and again today with patient on his visit.  He has been on combination of aspirin and prasugrel for the cardiac stents.  He has not had any headaches that he reports on visit today.  He had MATEUS done more recently which demonstrated significant atherosclerotic disease and plaque burden in the ascending aorta with low EF.  This is likely the etiology of his strokes which I discussed with patient and his cardiologist today. He was referred to me for evaluation and consideration for starting anticoagulation.   He reports smoking 1/2 to pack per day.     Past Medical History:   Diagnosis Date   • CAD (coronary artery disease)    • Cataract    • Coronary artery disease    • Diabetes mellitus (CMS/HCC)    • Diabetic neuropathy (CMS/HCC)    • GERD (gastroesophageal reflux disease)    • Hyperlipidemia    • Hypertension    • Intermittent claudication (CMS/HCC)    • PAD (peripheral artery disease) (CMS/HCC)     of lower extremities   • Recovering alcoholic (CMS/HCC)    • STEMI (ST elevation myocardial infarction) (CMS/[facility]) 11/2020        Family History   Problem Relation Age of Onset   • Diabetes Mother    • Hypertension Mother    • Pancreatic cancer Mother    • Cancer Mother    • Esophageal cancer Father    • Cancer Father    • Esophageal cancer Brother    • Cancer Brother    • Cancer Paternal Grandmother    • Diabetes Paternal Grandmother    • Alcohol abuse Brother    • Cancer Brother         Social History     Socioeconomic History   • Marital status:      Spouse name: Not on file   • Number of children: Not on file   • Years of education: Not on file   • Highest education level: Not on file   Occupational History     Employer: [employer]   Tobacco Use   • Smoking status: Current Every Day Smoker     Packs/day: 0.50   • Smokeless tobacco: Current User   Substance and Sexual Activity   • Alcohol use: Yes     Comment: Seldom/ Caffeine use: 8-10 cups daily   • Drug use: No   • Sexual activity: Defer        I have personally reviewed the ROS as stated below.     Review of Systems   Constitutional: Negative for activity change, appetite change and fatigue.   HENT: Negative for ear pain, trouble swallowing and voice change.    Eyes: Negative for blurred vision, double vision and pain.   Respiratory: Negative for cough, shortness of breath and stridor.    Cardiovascular: Negative for chest pain, palpitations and leg swelling.   Gastrointestinal: Negative for abdominal pain, nausea and vomiting.  "  Endocrine: Negative for cold intolerance, heat intolerance and polydipsia.   Genitourinary: Positive for flank pain (Left kidney failed). Negative for decreased urine volume, urgency and urinary incontinence.   Musculoskeletal: Negative for back pain, gait problem and neck pain.   Allergic/Immunologic: Negative for environmental allergies and food allergies.   Neurological: Positive for speech difficulty. Negative for dizziness, tremors, seizures, syncope, facial asymmetry, weakness, light-headedness, numbness, headache, memory problem and confusion.   Hematological: Does not bruise/bleed easily.   Psychiatric/Behavioral: Negative for agitation, behavioral problems, decreased concentration, dysphoric mood, hallucinations, self-injury, sleep disturbance, suicidal ideas, negative for hyperactivity, depressed mood and stress. The patient is not nervous/anxious.         Objective   Vital Signs:   /82 (BP Location: Right arm, Patient Position: Sitting)   Pulse 86   Ht 182.9 cm (72\")   Wt 93 kg (205 lb)   SpO2 98%   BMI 27.80 kg/m²       PHYSICAL EXAM:    General   Mental Status - Alert. General Appearance - Well developed, Well groomed, Oriented and Cooperative. Orientation - Oriented X3.       Head and Neck  Head - normocephalic, atraumatic with no lesions or palpable masses.  Neck    Global Assessment - supple.       Eye   Sclera/Conjunctiva - Bilateral - Normal.    ENMT  Mouth and Throat   Oral Cavity/Oropharynx: Oropharynx - the soft palate,uvula and tongue are normal in appearance.    Chest and Lung Exam   Chest - lung clear to auscultation bilaterally.    Cardiovascular   Cardiovascular examination reveals  - normal heart sounds, regular rate and rhythm.    Neurologic   Mental Status: Speech - Slurred. Cognitive function - appropriate fund of knowledge. No impairment of attention, Impairment of concentration, impairment of long term memory or impairment of short term memory.  Cranial Nerves:   II " Optic: Visual acuity - Left - Normal. Right - Normal. Visual fields - Normal (to confrontation).  III Oculomotor: Pupillary constriction - Left - Normal. Right - Normal.  VII Facial: - Normal Bilaterally.  VIII Acoustic - Bilateral - Hearing normal and (Hearing tested by finger rub).   IX Glossopharyngeal / X Vagus - Normal.  XI Accessory: Trapezius - Bilateral - Normal. Sternocleidomastoid - Bilateral - Normal.  XII Hypoglossal - Bilateral - Normal.  Eye Movements: - Normal Bilaterally.  Sensory:   Light Touch: Intact - Globally.  Motor:   Bulk and Contour: - Normal.  Tone: - Normal.  Tremor: Not present.  Strength: 5/5 normal muscle strength - All Muscles.   General Assessment of Reflexes: - deep tendon reflexes are normal. Coordination - No Impairment of finger-to-nose but does have some Impairment of rapid alternating movements. Gait - Normal.       Result Review :                 Assessment and Plan    Problem List Items Addressed This Visit        Neuro    Embolic stroke (CMS/HCC) - Primary    Current Assessment & Plan     60 year old man with significant history of cardiac disease and atherosclerotic disease who presents to neurology clinic for initial evaluation and treatment of stroke and concerns for cardioembolic etiology.  He has history of heart attacks with the previous one in 2014 and then had a STEMI in 11/2020 for which he had cardiac cath and 2 stents placed most recently.  When he was being evaluated by his cardiologist (Dr. Peterson) he informed her of trouble with his speech and slurring of his speech and some trouble with dexterity which made her concerned for possible stroke.   She ordered a brain MRI scan which demonstrated multiple old infarcts in different vascular distributions concerning for central or possible cardiac embolic source.   It also demonstrated old microhemorrhages due to possible underlying amyloid angiopathy.  Furthermore his CTA demonstrated complete occlusion of the left  ICA.  I discussed these findings with patient and his cardiologist over the phone at the time of the MRI findings and again today with patient on his visit.  He has been on combination of aspirin and prasugrel for the cardiac stents.  He has not had any headaches that he reports on visit today.  He had MATEUS done more recently which demonstrated significant atherosclerotic disease and plaque burden in the ascending aorta with low EF.  This is likely the etiology of his strokes which I discussed with patient and his cardiologist today. He was referred to me for evaluation and consideration for starting anticoagulation.   I reviewed the brain MRI images on his visit today and agree with the findings as described by radiology.  He is on dual antiplatelet therapy currently along with Crestor.  Given the significant plaque burden in ascending aorta and concern for mobile components that can lead to future strokes I discussed with his cardiologist and patient about starting Xarelto which can be reversed in case of intracranial hemorrhage.  Also discussed discontinuing the baby aspirin and continuing the prasugrel to keep the cardiac stents patent and prevent another MI.  Discussed the risks and benefits of this treatment with patient extensively and discussed our mutual decision made by myself and his cardiologist on visit today with patient at length.  He is agreeable to this treatment decision.  Advised patient to be taken to the ED with any new stroke symptoms or new onset severe headache as this could potentially indicate intracranial hemorrhage for which he would need to be urgently treated.  Provided patient education information on stroke and answered all of his questions today along with discussing decision with Dr. Peterson over the phone.  Will follow up in 3 months and sooner if needed.                I spent 60 minutes caring for Sylvain on this date of service. This time includes time spent by me in the  following activities:preparing for the visit, reviewing tests, obtaining and/or reviewing a separately obtained history, performing a medically appropriate examination and/or evaluation , counseling and educating the patient/family/caregiver, ordering medications, tests, or procedures, referring and communicating with other health care professionals , documenting information in the medical record, independently interpreting results and communicating that information with the patient/family/caregiver and care coordination    Follow Up   Return in about 3 months (around 6/5/2021).  Patient was given instructions and counseling regarding his condition or for health maintenance advice. Please see specific information pulled into the AVS if appropriate.

## 2021-03-18 ENCOUNTER — OFFICE VISIT (OUTPATIENT)
Dept: CARDIOLOGY | Facility: CLINIC | Age: 61
End: 2021-03-18

## 2021-03-18 VITALS
BODY MASS INDEX: 27.68 KG/M2 | HEIGHT: 72 IN | SYSTOLIC BLOOD PRESSURE: 136 MMHG | HEART RATE: 66 BPM | WEIGHT: 204.4 LBS | DIASTOLIC BLOOD PRESSURE: 70 MMHG

## 2021-03-18 DIAGNOSIS — F17.210 CIGARETTE SMOKER: ICD-10-CM

## 2021-03-18 DIAGNOSIS — E11.59 TYPE 2 DIABETES MELLITUS WITH OTHER CIRCULATORY COMPLICATION, WITHOUT LONG-TERM CURRENT USE OF INSULIN (HCC): ICD-10-CM

## 2021-03-18 DIAGNOSIS — I25.5 ISCHEMIC CARDIOMYOPATHY: ICD-10-CM

## 2021-03-18 DIAGNOSIS — E78.2 MIXED HYPERLIPIDEMIA: ICD-10-CM

## 2021-03-18 DIAGNOSIS — I63.10 CEREBROVASCULAR ACCIDENT (CVA) DUE TO EMBOLISM OF PRECEREBRAL ARTERY (HCC): ICD-10-CM

## 2021-03-18 DIAGNOSIS — I25.10 ATHEROSCLEROSIS OF NATIVE CORONARY ARTERY OF NATIVE HEART WITHOUT ANGINA PECTORIS: Primary | ICD-10-CM

## 2021-03-18 DIAGNOSIS — I73.9 PAD (PERIPHERAL ARTERY DISEASE) (HCC): ICD-10-CM

## 2021-03-18 DIAGNOSIS — I10 ESSENTIAL HYPERTENSION: ICD-10-CM

## 2021-03-18 PROCEDURE — 93000 ELECTROCARDIOGRAM COMPLETE: CPT | Performed by: INTERNAL MEDICINE

## 2021-03-18 PROCEDURE — 99214 OFFICE O/P EST MOD 30 MIN: CPT | Performed by: INTERNAL MEDICINE

## 2021-03-18 RX ORDER — CARVEDILOL 6.25 MG/1
6.25 TABLET ORAL 2 TIMES DAILY
Qty: 180 TABLET | Refills: 3 | Status: SHIPPED | OUTPATIENT
Start: 2021-03-18 | End: 2021-04-06 | Stop reason: SDUPTHER

## 2021-03-18 NOTE — PROGRESS NOTES
Date of Office Visit: 2021  Encounter Provider: Germania Peterson MD  Place of Service: [facility] CARDIOLOGY  Patient Name: Sylvain Amador  :1960      Patient ID:  Sylvain Amador is a 60 y.o. male is here for  followup for vascular disease, strokes, CAD.        History of Present Illness    He was seen in 10/2014 for chest pain after he had made an emergency department visit. When I saw him, I was concerned this was  coronary ischemia, so we set him up for a stress nuclear perfusion study done 10/24/2014,  showing a small infarct with a moderate amount of anuradha-infarct ischemia in the inferior  wall. Ejection fraction was 46%. He also had complaints of lower extremity cramping with  walking. He would have to stop and rest before he would be able to go on. He had a lower  extremity arterial duplex study done 10/24/2014, showing a severe obstructive disease  involving the left and right common femoral and superficial femoral artery systems.      On 10/28/2014, he underwent cardiac catheterization, which showed 40% diffuse  [id number] LAD stenosis, 90% discrete OM-1 stenosis, 90% discrete mid-RCA stenosis, 40%  distal RCA stenosis of the bifurcation. He had plain old balloon angioplasty at the first  obtuse marginal branch, and he received a 3.5 x 15 mm Xience Xpedition drug-eluting stent  to the mid-RCA. The OM-1 branch did not get a stent. His ejection fraction at cath was  normal.      He then on 2014 had a CTA of the abdomen and pelvis with lower extremity runoff.  This showed completely occlusion of the superficial femoral arteries bilaterally with  reconstitution of popliteal arteries via the profunda femoral collaterals. There was also  a fusiform infrarenal aortic aneurysm measuring 3.5 cm.      He went on to see Dr. Reyes because of the claudication in his legs, which he still  has.  Dr. Reyes recommended angiography which the patient had done on  01/23/2015.  This  showed atherosclerotic disease of the aorta which was moderate and diffuse.  SFA was  occluded on the right.  SFA was occluded on the left.  Mild disease in the common femoral  artery bilaterally.  Popliteal artery was normal; reconstitution with two vessel runoff to  both of his feet.  There was a mild infrarenal abdominal aortic aneurysm.  Dr. Reyes said  there was nothing more he could do and recommended that the patient go on to see vascular  surgery. He did see Dr. Arturo Huynh who said that he really  cannot do surgery outside of vein transplantation which does not last. He has elected to  really go after the lifestyle changes, and he is doing better.      He had lower extremity arterial duplex studies done on  05/11/2015. The right ankle brachial index showed several arterial occlusive disease with  severe digital ischemia of the right toe. The atrial occlusive disease in the right leg  was in the aortoiliac and femoral segments. The ankle brachial index on the left showed  moderate arterial occlusive disease with moderate digital ischemia and moderate occlusive  disease of the aortoiliac and femoral segments.      His wife has had breast cancer.      In November 2020, he presented to the [facility] ED with severe midsternal chest pain radiating to his right arm with nausea.  He was diagnosed with a STEMI and underwent drug-eluting stent placement to the proximal to mid LAD (3.25 x 33) and mid to distal LAD (2.75 x 12), and ostial RCA (4 x 15).  Prior to the revascularization of the LAD he had ventricular tachycardia and received 1 shock.  His ejection fraction was 40%.  His lisinopril was discontinued and he was started on Entresto.       He has stress study done 1/13/2021 which showed ejection fraction reduces 27% with a medium size infarct in the apex with moderate anuradha-infarct ischemia and this was a change from a stress study done 10/6/2020.  An echocardiogram done 1/27/2021 showing  ejection fraction 35% with apical akinesis and septal akinesis.  His diastolic function was normal.  There is no significant valve disease.  He had a repeat cardiac catheterization done 2/4/2021 showing patent LAD and RCA stents and normal LV filling pressures, medical management recommended.    Saw him in the office on 1/27/2021 and he had slurred speech.  I recommend that he have an MRI done which performed 2/23/2021 and this showed multiple tiny old lacunar infarcts, multiple tiny right and left PICA territory infarcts, multiple hemosiderin deposits.  There is also suggestion of occlusion of the left internal carotid artery.  CTA of the head neck was then done on 2/14/21 showing occlusion of the left internal carotid artery at the origin and reconstituting at the supraclinoid segment.  There was no significant stenosis of the right internal carotid artery.  Multiple chronic infarcts were noted in multiple vascular distributions.  He had MATEUS done 3/3/2021 which showed severe calcified plaque at the sinotubular junction and into the proximal ascending aorta as well severe plaques in the descending thoracic aorta.  In the aortic arch, there was a severe plaque with a mobile structure on it.  His ejection fraction was 36 to 40% with akinesis of the anterior septum, anterior apex, septal apex and distal anterior wall.  His saline contrast study was negative.  There was a very small pericardial effusion and grade 2 diastolic dysfunction.  His rosuvastatin was increased to 40 mg daily.  He saw Dr. Lopez with neurology on 3/5/2021 and was recommended at that time to start Xarelto 20 mg daily.  Aspirin was discontinued then and and he remains on prasugrel.    He has had no chest pain, tachycardia, palpitations, dizziness or syncope.  He is little off balance because of his strokes.  He has some mild exertional dyspnea.  He does continue to smoke half pack of cigarettes a day.  He is concerned that he has not failed to  continue to do his job and then will lose his insurance.  I advised him to file for disability.  I have also advised him to get a  to help with this process.  He is taking his medications as directed.  The only high cost medication he has right now is Effient and his Entresto is covered.    Past Medical History:   Diagnosis Date   • CAD (coronary artery disease)    • Cataract    • Coronary artery disease    • Diabetes mellitus (CMS/[facility])    • Diabetic neuropathy (CMS/[facility])    • GERD (gastroesophageal reflux disease)    • Hyperlipidemia    • Hypertension    • Intermittent claudication (CMS/[facility])    • PAD (peripheral artery disease) (CMS/[facility])     of lower extremities   • Recovering alcoholic (CMS/[facility])    • STEMI (ST elevation myocardial infarction) (CMS/[facility]) 11/2020         Past Surgical History:   Procedure Laterality Date   • ARTERIOGRAM AORTIC Left 8/2/2018    Procedure: AORTIC AND LEFT ILIAC ARTERY ANEURYSM REPAIR;  Surgeon: Arturo Huynh MD;  Location: [facility] MAIN OR;  Service: Vascular   • CARDIAC CATHETERIZATION  11/2015   • CARDIAC CATHETERIZATION N/A 11/22/2020    Procedure: Left Heart Cath;  Surgeon: Suraj Vaughn MD;  Location: [facility] CATH INVASIVE LOCATION;  Service: Cardiovascular;  Laterality: N/A;   • CARDIAC CATHETERIZATION N/A 11/22/2020    Procedure: Percutaneous Coronary Intervention;  Surgeon: Suraj Vaughn MD;  Location: [facility] CATH INVASIVE LOCATION;  Service: Cardiovascular;  Laterality: N/A;   • CARDIAC CATHETERIZATION N/A 11/22/2020    Procedure: Stent JARRED coronary;  Surgeon: Suraj Vaughn MD;  Location: [facility] CATH INVASIVE LOCATION;  Service: Cardiovascular;  Laterality: N/A;   • CARDIAC CATHETERIZATION N/A 11/22/2020    Procedure: Coronary angiography;  Surgeon: Suraj Vaughn MD;  Location: [facility] CATH INVASIVE LOCATION;  Service: Cardiovascular;  Laterality: N/A;   • CARDIAC CATHETERIZATION N/A 2/4/2021    Procedure: Coronary  angiography;  Surgeon: Stefany Dawn MD;  Location:  [facility] CATH INVASIVE LOCATION;  Service: Cardiovascular;  Laterality: N/A;   • CARDIAC CATHETERIZATION N/A 2/4/2021    Procedure: Left Heart Cath;  Surgeon: Stefany Dawn MD;  Location:  [facility] CATH INVASIVE LOCATION;  Service: Cardiovascular;  Laterality: N/A;   • CARDIAC ELECTROPHYSIOLOGY PROCEDURE N/A 11/22/2020    Procedure: Cardioversion/Defibrillation;  Surgeon: Suraj Vaughn MD;  Location:  [facility] CATH INVASIVE LOCATION;  Service: Cardiovascular;  Laterality: N/A;   • CORONARY ANGIOPLASTY WITH STENT PLACEMENT  2015   • RHINOPLASTY  1975   • VASECTOMY         Current Outpatient Medications on File Prior to Visit   Medication Sig Dispense Refill   • carvedilol (COREG) 3.125 MG tablet Take 1 tablet by mouth 2 (Two) Times a Day. 180 tablet 3   • metFORMIN (GLUCOPHAGE) 500 MG tablet Take 2 tablets by mouth 2 (Two) Times a Day With Meals. 360 tablet 3   • prasugrel (EFFIENT) 10 MG tablet Take 1 tablet by mouth Daily. 30 tablet 11   • rivaroxaban (Xarelto) 20 MG tablet Take 1 tablet by mouth Daily With Dinner. 90 tablet 3   • rosuvastatin (CRESTOR) 40 MG tablet Take 1 tablet by mouth Daily. 90 tablet 3   • sacubitril-valsartan (Entresto) 49-51 MG tablet Take 1 tablet by mouth 2 (Two) Times a Day. 180 tablet 3     No current facility-administered medications on file prior to visit.       Social History     Socioeconomic History   • Marital status:      Spouse name: Not on file   • Number of children: Not on file   • Years of education: Not on file   • Highest education level: Not on file   Tobacco Use   • Smoking status: Current Every Day Smoker     Packs/day: 0.50   • Smokeless tobacco: Current User   Substance and Sexual Activity   • Alcohol use: Yes     Comment: Seldom/ Caffeine use: 8-10 cups daily   • Drug use: No   • Sexual activity: Defer           ROS    Procedures    ECG 12 Lead    Date/Time: 3/18/2021 10:35 AM  Performed by: Nicholas  "Germania SIMS MD  Authorized by: Germania Peterson MD   Comparison: compared with previous ECG   Similar to previous ECG  Rhythm: sinus rhythm  Q waves: V2 and V3    T inversion: aVL, V2, V3, V4 and V5  T flattening: I and V6    Clinical impression: abnormal EKG                Objective:      Vitals:    03/18/21 1011   BP: 136/70   BP Location: Left arm   Pulse: 66   Weight: 92.7 kg (204 lb 6.4 oz)   Height: 182.9 cm (72\")     Body mass index is 27.72 kg/m².    Vitals reviewed.   Constitutional:       General: Not in acute distress.     Appearance: Well-developed. Not diaphoretic.   Eyes:      General: No scleral icterus.     Conjunctiva/sclera: Conjunctivae normal.   HENT:      Head: Normocephalic and atraumatic.   Neck:      Thyroid: No thyromegaly.      Vascular: No carotid bruit or JVD.      Lymphadenopathy: No cervical adenopathy.   Pulmonary:      Effort: Pulmonary effort is normal. No respiratory distress.      Breath sounds: Normal breath sounds. No wheezing. No rhonchi. No rales.   Chest:      Chest wall: Not tender to palpatation.   Cardiovascular:      Normal rate. Regular rhythm.      Murmurs: There is no murmur.      No gallop.   Pulses:     Intact distal pulses.   Edema:     Peripheral edema absent.   Abdominal:      General: Bowel sounds are normal. There is no distension or abdominal bruit.      Palpations: Abdomen is soft. There is no abdominal mass.      Tenderness: There is no abdominal tenderness.   Musculoskeletal:         General: No deformity.      Extremities: No clubbing present.     Cervical back: Neck supple. Skin:     General: Skin is warm and dry. There is no cyanosis.      Coloration: Skin is not pale.      Findings: No rash.   Neurological:      Mental Status: Alert and oriented to person, place, and time.      Cranial Nerves: No cranial nerve deficit.   Psychiatric:         Judgment: Judgment normal.         Lab Review:       Assessment:      Diagnosis Plan   1. Atherosclerosis of " native coronary artery of native heart without angina pectoris     2. Ischemic cardiomyopathy     3. Essential hypertension     4. Mixed hyperlipidemia     5. PAD (peripheral artery disease) (CMS/[facility])     6. Type 2 diabetes mellitus with other circulatory complication, without long-term current use of insulin (CMS/[facility])     7. Cerebrovascular accident (CVA) due to embolism of precerebral artery (CMS/[facility])     8. Cigarette smoker       1. CAD, s/p POBA to OM1 and PCI with stent to mid RCA, ostial RCA, proximal LAD and mid LAD, no angina.   Stress nuclear perfusion study in 1/21 shows apical ischemia.    Repeat cardiac catheterization done 2/4/2021 showed patent RCA and LAD stents.  2. Peripheral arterial disease. Occlusion of bilateral superficial femoral arteries. Sees Dr. Huynh.  Is treating this with lifestyle changes and is claudication has improved.   3. Smoking. He is smoking.   Advised cessation of smoking.  4. Hyperlipidemia, on rosuvastatin  5. HTN, well controlled.   6.  Ischemic cardiomyopathy, ejection fraction 35-40%.  7.    Multiple strokes in multiple vascular distributions, now on Xarelto and high-dose rosuvastatin.  This is the source of his slurred speech.  He follows with Dr. Lopez from neurology.  8.  Severe atherosclerosis of the ascending aorta, aortic arch and descending aorta.     Plan:       Increase Coreg to 6.25 twice daily as a when his blood pressure will bit lower.  See Sabrina in 6 weeks.  If he loses insurance, I could see him to have a heart.

## 2021-03-19 ENCOUNTER — BULK ORDERING (OUTPATIENT)
Dept: CASE MANAGEMENT | Facility: OTHER | Age: 61
End: 2021-03-19

## 2021-03-19 DIAGNOSIS — Z23 IMMUNIZATION DUE: ICD-10-CM

## 2021-04-06 RX ORDER — CARVEDILOL 6.25 MG/1
6.25 TABLET ORAL 2 TIMES DAILY
Qty: 180 TABLET | Refills: 3 | Status: SHIPPED | OUTPATIENT
Start: 2021-04-06 | End: 2021-04-29 | Stop reason: SDUPTHER

## 2021-04-29 ENCOUNTER — OFFICE VISIT (OUTPATIENT)
Dept: CARDIOLOGY | Facility: CLINIC | Age: 61
End: 2021-04-29

## 2021-04-29 VITALS
BODY MASS INDEX: 28.12 KG/M2 | SYSTOLIC BLOOD PRESSURE: 122 MMHG | DIASTOLIC BLOOD PRESSURE: 82 MMHG | WEIGHT: 207.6 LBS | HEART RATE: 73 BPM | HEIGHT: 72 IN

## 2021-04-29 DIAGNOSIS — I10 ESSENTIAL HYPERTENSION: ICD-10-CM

## 2021-04-29 DIAGNOSIS — E78.2 MIXED HYPERLIPIDEMIA: ICD-10-CM

## 2021-04-29 DIAGNOSIS — I63.10 CEREBROVASCULAR ACCIDENT (CVA) DUE TO EMBOLISM OF PRECEREBRAL ARTERY (HCC): ICD-10-CM

## 2021-04-29 DIAGNOSIS — F17.210 CIGARETTE SMOKER: ICD-10-CM

## 2021-04-29 DIAGNOSIS — I25.5 ISCHEMIC CARDIOMYOPATHY: ICD-10-CM

## 2021-04-29 DIAGNOSIS — I25.10 ATHEROSCLEROSIS OF NATIVE CORONARY ARTERY OF NATIVE HEART WITHOUT ANGINA PECTORIS: Primary | ICD-10-CM

## 2021-04-29 DIAGNOSIS — I73.9 PAD (PERIPHERAL ARTERY DISEASE) (HCC): ICD-10-CM

## 2021-04-29 PROBLEM — N19 KIDNEY FAILURE: Status: RESOLVED | Noted: 2018-10-27 | Resolved: 2021-04-29

## 2021-04-29 PROCEDURE — 93000 ELECTROCARDIOGRAM COMPLETE: CPT | Performed by: NURSE PRACTITIONER

## 2021-04-29 PROCEDURE — 99214 OFFICE O/P EST MOD 30 MIN: CPT | Performed by: NURSE PRACTITIONER

## 2021-04-29 RX ORDER — CARVEDILOL 25 MG/1
25 TABLET ORAL 2 TIMES DAILY WITH MEALS
Qty: 180 TABLET | Refills: 0 | Status: SHIPPED | OUTPATIENT
Start: 2021-04-29 | End: 2021-05-28 | Stop reason: SDUPTHER

## 2021-04-29 RX ORDER — SACUBITRIL AND VALSARTAN 24; 26 MG/1; MG/1
1 TABLET, FILM COATED ORAL EVERY 12 HOURS
COMMUNITY
Start: 2021-04-15 | End: 2021-04-29 | Stop reason: ALTCHOICE

## 2021-04-29 RX ORDER — ROSUVASTATIN CALCIUM 20 MG/1
20 TABLET, COATED ORAL DAILY
COMMUNITY
Start: 2021-03-12 | End: 2021-04-29 | Stop reason: ALTCHOICE

## 2021-04-29 NOTE — PROGRESS NOTES
Date of Office Visit: 2021  Encounter Provider: BELKIS Campbell  Place of Service: [facility]  Patient Name: Sylvain Amador  :1960  Primary Cardiologist: Dr. Germania Peterson     Chief Complaint   Patient presents with   • Cardiomyopathy   • Follow-up   :     HPI: Sylvain Amador is a pleasant 60 y.o. male who presents today for cardiac follow up visit. I have reviewed his medical records.     In 2014, he was diagnosed with coronary artery disease and underwent drug-eluting stent placement to the mid RCA and balloon angioplasty to the first obtuse marginal branch.  He has a known abdominal aortic aneurysm and PAD followed by Dr. Herbert Huynh.  He has been a longtime cigarette smoker.  He has hyperlipidemia and has been intolerant to previous statins.     In 2020, he followed up with me in the office and was stable at that time.  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   He never had the testing completed.  In 2020, he had a Lexiscan Myoview stress test which showed no evidence of ischemia.     In 2020, he presented to the [facility], [place], operated by [facility] ED with severe midsternal chest pain radiating to his right arm with nausea.  He was diagnosed with a STEMI and underwent drug-eluting stent placement to the proximal to mid LAD and mid to distal LAD, and ostial RCA.  Prior to the revascularization of the LAD he had ventricular tachycardia and received 1 shock.  His ejection fraction was 40%.  His lisinopril was discontinued and he was started on Entresto.      In 2021, his ejection fraction reduced to 27%.  Repeat echocardiogram a few weeks later showed an EF of 35%.  In 2021, he underwent cardiac catheterization which showed patent LAD and RCA stents and normal LV filling pressures.  Medical treatment was recommended.  At the end of January, Dr. Peterson saw him in the office  "he had slurred speech.  An MRI was completed which showed multiple tiny old lacunar infarcts, multiple tiny right and left peaked territory infarcts, and multiple deposits.      In March 2021, he underwent a MATEUS which showed EF 36-40%, wall motion abnormalities, saline test negative, very small pericardial effusion, grade 2 diastolic dysfunction, and aortic arch plaque.  His rosuvastatin was increased to 40 mg daily.  Neurology recommended that he start rivaroxaban and continue with prasugrel.  Aspirin was discontinued.  He then followed up with Dr. Peterson in the office and she increase his carvedilol to 6.25 mg for goal-directed medical therapy.  She also recommended that he file for disability.  She said if he loses his insurance, she could refer him to [facility].    He presents today for his follow-up visit.  He is tolerating the increased dose of carvedilol without any adverse effects.  He says occasionally he has a \"light ache\" in the center of his chest for a few minutes while sitting.  He denies shortness of breath, palpitations, edema, dizziness, syncope, or bleeding.  His blood pressure and heart rate are both normal today.       Past Medical History:   Diagnosis Date   • CAD (coronary artery disease)    • Cataract    • Diabetes mellitus (CMS/[facility])    • Diabetic neuropathy (CMS/[facility])    • GERD (gastroesophageal reflux disease)    • Hyperlipidemia    • Hypertension    • Intermittent claudication (CMS/HCC)    • PAD (peripheral artery disease) (CMS/[facility])     of lower extremities   • Recovering alcoholic (CMS/HCC)    • STEMI (ST elevation myocardial infarction) (CMS/[facility]) 11/2020       Past Surgical History:   Procedure Laterality Date   • ARTERIOGRAM AORTIC Left 8/2/2018    Procedure: AORTIC AND LEFT ILIAC ARTERY ANEURYSM REPAIR;  Surgeon: Arturo Huynh MD;  Location: [facility];  Service: Vascular   • CARDIAC CATHETERIZATION  11/2015   • CARDIAC CATHETERIZATION N/A 11/22/2020    Procedure: Left " Heart Cath;  Surgeon: Suraj Vaughn MD;  Location:  [facility];  Service: Cardiovascular;  Laterality: N/A;   • CARDIAC CATHETERIZATION N/A 11/22/2020    Procedure: Percutaneous Coronary Intervention;  Surgeon: Suraj Vaughn MD;  Location:  [facility];  Service: Cardiovascular;  Laterality: N/A;   • CARDIAC CATHETERIZATION N/A 11/22/2020    Procedure: Stent JARRED coronary;  Surgeon: Suraj Vaughn MD;  Location:  [facility];  Service: Cardiovascular;  Laterality: N/A;   • CARDIAC CATHETERIZATION N/A 11/22/2020    Procedure: Coronary angiography;  Surgeon: Suraj Vaughn MD;  Location:  [facility];  Service: Cardiovascular;  Laterality: N/A;   • CARDIAC CATHETERIZATION N/A 2/4/2021    Procedure: Coronary angiography;  Surgeon: Stefany Dawn MD;  Location:  [facility];  Service: Cardiovascular;  Laterality: N/A;   • CARDIAC CATHETERIZATION N/A 2/4/2021    Procedure: Left Heart Cath;  Surgeon: Stefany Dawn MD;  Location:  [facility];  Service: Cardiovascular;  Laterality: N/A;   • CARDIAC ELECTROPHYSIOLOGY PROCEDURE N/A 11/22/2020    Procedure: Cardioversion/Defibrillation;  Surgeon: Suraj Vaughn MD;  [facility];  Service: Cardiovascular;  Laterality: N/A;   • CORONARY ANGIOPLASTY WITH STENT PLACEMENT  2015   • RHINOPLASTY  1975   • VASECTOMY         Social History     Socioeconomic History   • Marital status:      Spouse name: Not on file   • Number of children: Not on file   • Years of education: Not on file   • Highest education level: Not on file   Tobacco Use   • Smoking status: Current Every Day Smoker     Packs/day: 0.50   • Smokeless tobacco: Current User   Substance and Sexual Activity   • Alcohol use: Yes     Comment: Seldom/ Caffeine use: 8-10 cups daily   • Drug use: No   • Sexual activity: Defer       Family History  "  Problem Relation Age of Onset   • Diabetes Mother    • Hypertension Mother    • Pancreatic cancer Mother    • Cancer Mother    • Esophageal cancer Father    • Cancer Father    • Esophageal cancer Brother    • Cancer Brother    • Cancer Paternal Grandmother    • Diabetes Paternal Grandmother    • Alcohol abuse Brother    • Cancer Brother        The following portion of the patient's history were reviewed and updated as appropriate: past medical history, past surgical history, past social history, past family history, allergies, current medications, and problem list.    Review of Systems   Constitutional: Negative.   Cardiovascular: Positive for chest pain.   Respiratory: Negative.    Hematologic/Lymphatic: Negative.    Neurological: Negative.        Allergies   Allergen Reactions   • No Known Drug Allergy          Current Outpatient Medications:   •  carvedilol (COREG) 6.25 MG tablet, Take 1 tablet by mouth 2 (Two) Times a Day., Disp: 180 tablet, Rfl: 3  •  metFORMIN (GLUCOPHAGE) 500 MG tablet, Take 2 tablets by mouth 2 (Two) Times a Day With Meals., Disp: 360 tablet, Rfl: 3  •  prasugrel (EFFIENT) 10 MG tablet, Take 1 tablet by mouth Daily., Disp: 30 tablet, Rfl: 11  •  rivaroxaban (Xarelto) 20 MG tablet, Take 1 tablet by mouth Daily With Dinner., Disp: 90 tablet, Rfl: 3  •  rosuvastatin (CRESTOR) 40 MG tablet, Take 1 tablet by mouth Daily., Disp: 90 tablet, Rfl: 3  •  sacubitril-valsartan (Entresto) 49-51 MG tablet, Take 1 tablet by mouth 2 (Two) Times a Day., Disp: 180 tablet, Rfl: 3        Objective:     Vitals:    04/29/21 0814 04/29/21 0819   BP: 122/82 122/82   BP Location: Left arm Right arm   Pulse: 73    Weight: 94.2 kg (207 lb 9.6 oz)    Height: 182.9 cm (72\")      Body mass index is 28.16 kg/m².    PHYSICAL EXAM:    Vitals Reviewed.   General Appearance: No acute distress, well developed and well nourished.  Eyes: Conjunctiva and lids: No erythema, swelling, or discharge. Sclera non-icteric.   HENT: " Atraumatic, normocephalic. External eyes, ears, and nose normal. No hearing loss noted. Mucous membranes normal. Lips not cyanotic. Neck supple with no tenderness.  Respiratory: No signs of respiratory distress. Respiration rhythm and depth normal.   Clear to auscultation. No rales, crackles, rhonchi, or wheezing auscultated.   Cardiovascular:  Jugular Venous Pressure: Normal  Heart Rate and Rhythm: Normal, Heart Sounds: Normal S1 and S2. No S3 or S4 noted.  Murmurs: No murmurs noted. No rubs, thrills, or gallops.    Lower Extremities: No edema noted.  Gastrointestinal:  Abdomen soft, non-distended, non-tender. Normal bowel sounds.    Musculoskeletal: Normal movement of extremities  Skin and Nails: General appearance normal. No pallor, cyanosis, diaphoresis. Skin temperature normal. No clubbing of fingernails.   Psychiatric: Patient alert and oriented to person, place, and time. Speech and behavior appropriate. Normal mood and affect.       ECG 12 Lead    Date/Time: 4/29/2021 8:16 AM  Performed by: Sabrina Gonsalez APRN  Authorized by: Sabrina Gonsalez APRN   Comparison: compared with previous ECG from 3/18/2021  Similar to previous ECG  Rhythm: sinus rhythm  Rate: normal  BPM: 73  Conduction: conduction normal  ST Segments: ST segments normal  T inversion: I, aVL, V1, V2, V3 and V4  QRS axis: normal  Other findings: non-specific ST-T wave changes    Clinical impression: abnormal EKG              Assessment:       Diagnosis Plan   1. Atherosclerosis of native coronary artery of native heart without angina pectoris     2. Ischemic cardiomyopathy     3. Essential hypertension     4. Mixed hyperlipidemia     5. Cerebrovascular accident (CVA) due to embolism of precerebral artery (CMS/[facility])     6. Cigarette smoker     7. PAD (peripheral artery disease) (CMS/[facility])            Plan:       1.  Coronary Artery Disease: Status post stent to the RCA and LAD-both patent per last heart catheterization.  He reports some mild  chest pain at rest, never with exertion.  I think we should just continue to monitor.  His aspirin was discontinued by neurology.  Continue prasugrel, carvedilol, and rosuvastatin.    2.  Ischemic Cardiomyopathy: LVEF 35% per echo.  NYHA class I.  Increase carvedilol to 25 mg twice per day for goal-directed medical therapy.  Continue sacubitril/valsartan.    3.  Hypertension: Blood pressure well controlled.    4.  Hyperlipidemia: Continue with rosuvastatin.    5.  Previous Stroke: Denies any further neurological symptoms.  I noticed occasional slurred speech during the visit.  Continue rivaroxaban and rosuvastatin.    6.  Cigarette Smoking: He would benefit from abstaining from cigarette smoking.    7.  Aortic Plaque: Noted on MATEUS.  Continue Effient, rosuvastatin, and rivaroxaban.    8.  PAD: We appreciate Dr. Herbert Huynh continuing to follow this.    9.  He has applied for disability and currently has insurance.  Dr. Peterson said if he does not have insurance in the future then we can refer him to [facility].    10.  Follow-up with Dr. Peterson in 3 months per her request.    As always, it has been a pleasure to participate in your patient's care. Thank you.       Sincerely,       BELKIS Schmitz  [facility]      · COVID-19 Precautions - Patient was compliant in wearing a mask. When I saw the patient, I used appropriate personal protective equipment (PPE) including mask and eye shield (standard procedure).  Additionally, I used gown and gloves if indicated.  Hand hygiene was completed before and after seeing the patient.  · Dictated utilizing Dragon Dictation

## 2021-05-28 RX ORDER — CARVEDILOL 25 MG/1
25 TABLET ORAL 2 TIMES DAILY WITH MEALS
Qty: 180 TABLET | Refills: 3 | Status: SHIPPED | OUTPATIENT
Start: 2021-05-28

## 2021-06-15 ENCOUNTER — TELEPHONE (OUTPATIENT)
Dept: CARDIOLOGY | Facility: CLINIC | Age: 61
End: 2021-06-15

## 2021-06-15 ENCOUNTER — OFFICE VISIT (OUTPATIENT)
Dept: NEUROLOGY | Facility: CLINIC | Age: 61
End: 2021-06-15

## 2021-06-15 VITALS
HEART RATE: 45 BPM | WEIGHT: 208 LBS | DIASTOLIC BLOOD PRESSURE: 70 MMHG | BODY MASS INDEX: 28.17 KG/M2 | SYSTOLIC BLOOD PRESSURE: 110 MMHG | HEIGHT: 72 IN | OXYGEN SATURATION: 94 %

## 2021-06-15 DIAGNOSIS — I63.10 CEREBROVASCULAR ACCIDENT (CVA) DUE TO EMBOLISM OF PRECEREBRAL ARTERY (HCC): Primary | ICD-10-CM

## 2021-06-15 PROCEDURE — 99212 OFFICE O/P EST SF 10 MIN: CPT | Performed by: PSYCHIATRY & NEUROLOGY

## 2021-06-15 NOTE — PROGRESS NOTES
Chief Complaint  Cerebrovascular Accident    Subjective          Sylvain Amador presents to [facility] for   HISTORY OF PRESENT ILLNESS:    Sylvain Amador is 60 year old man with significant history of cardiac disease and atherosclerotic disease who returns to neurology clinic for follow up evaluation and treatment of stroke and concerns for cardioembolic etiology.  He has history of heart attacks with the previous one in 2014 and then had a STEMI in 11/2020 for which he had cardiac cath and 2 stents placed most recently.  When he was being evaluated by his cardiologist (Dr. Peterson) he informed her of trouble with his speech and slurring of his speech and some trouble with dexterity which made her concerned for possible stroke.   She ordered a brain MRI scan which demonstrated multiple old infarcts in different vascular distributions concerning for central or possible cardiac embolic source.   It also demonstrated old microhemorrhages due to possible underlying amyloid angiopathy.  Furthermore his CTA demonstrated complete occlusion of the left ICA.  I discussed these findings with patient and his cardiologist over the phone at the time of the MRI findings.  He has been on combination of Xarelto and prasugrel for the cardiac stents most recently.  He has not had any headaches that he reports on visit today.  He had MATEUS done more recently which demonstrated significant atherosclerotic disease and plaque burden in the ascending aorta with low EF.  This is likely the etiology of his strokes which I discussed with patient and his cardiologist on prior visit.  He denies any blood in stool or urine.  He denies any significant headaches.  He reports smoking 1/2 to pack per day.     Past Medical History:   Diagnosis Date   • CAD (coronary artery disease)    • Cataract    • Diabetes mellitus (CMS/[facility])    • Diabetic neuropathy (CMS/[facility])    • GERD (gastroesophageal reflux disease)    •  "Hyperlipidemia    • Hypertension    • Intermittent claudication (CMS/HCC)    • PAD (peripheral artery disease) (CMS/HCC)     of lower extremities   • Recovering alcoholic (CMS/HCC)    • STEMI (ST elevation myocardial infarction) (CMS/HCC) 11/2020        Family History   Problem Relation Age of Onset   • Diabetes Mother    • Hypertension Mother    • Pancreatic cancer Mother    • Cancer Mother    • Esophageal cancer Father    • Cancer Father    • Esophageal cancer Brother    • Cancer Brother    • Cancer Paternal Grandmother    • Diabetes Paternal Grandmother    • Alcohol abuse Brother    • Cancer Brother         Social History     Socioeconomic History   • Marital status:      Spouse name: Not on file   • Number of children: Not on file   • Years of education: Not on file   • Highest education level: Not on file   Tobacco Use   • Smoking status: Current Every Day Smoker     Packs/day: 0.50   • Smokeless tobacco: Current User   Substance and Sexual Activity   • Alcohol use: Yes     Comment: Seldom/ Caffeine use: 8-10 cups daily   • Drug use: No   • Sexual activity: Defer        I have personally reviewed the ROS as stated below.     Review of Systems   Eyes: Negative for blurred vision, double vision and pain.   Musculoskeletal: Negative for back pain and gait problem.   Neurological: Positive for speech difficulty and light-headedness. Negative for dizziness, tremors, seizures, syncope, facial asymmetry, weakness, numbness, headache, memory problem and confusion.   Psychiatric/Behavioral: Negative for agitation, behavioral problems, decreased concentration, dysphoric mood, hallucinations, self-injury, sleep disturbance, suicidal ideas, negative for hyperactivity, depressed mood and stress. The patient is not nervous/anxious.         Objective   Vital Signs:   /70   Pulse (!) 45   Ht 182 cm (71.65\")   Wt 94.3 kg (208 lb)   SpO2 94%   BMI 28.48 kg/m²       PHYSICAL EXAM:    General   Mental Status - " Alert. General Appearance - Well developed, Well groomed, Oriented and Cooperative. Orientation - Oriented X3.                            Head and Neck  Head - normocephalic, atraumatic with no lesions or palpable masses.  Neck                 Global Assessment - supple.                                         Eye   Sclera/Conjunctiva - Bilateral - Normal.     ENMT  Mouth and Throat   Oral Cavity/Oropharynx: Oropharynx - the soft palate,uvula and tongue are normal in appearance.     Chest and Lung Exam   Chest - lung clear to auscultation bilaterally.     Cardiovascular   Cardiovascular examination reveals  - normal heart sounds, regular rate and rhythm.     Neurologic   Mental Status: Speech - Slurred. Cognitive function - appropriate fund of knowledge. No impairment of attention, Impairment of concentration, impairment of long term memory or impairment of short term memory.  Cranial Nerves:   II Optic: Visual acuity - Left - Normal. Right - Normal. Visual fields - Normal (to confrontation).  III Oculomotor: Pupillary constriction - Left - Normal. Right - Normal.  VII Facial: - Normal Bilaterally.  VIII Acoustic - Bilateral - Hearing normal and (Hearing tested by finger rub).   IX Glossopharyngeal / X Vagus - Normal.  XI Accessory: Trapezius - Bilateral - Normal. Sternocleidomastoid - Bilateral - Normal.  XII Hypoglossal - Bilateral - Normal.  Eye Movements: - Normal Bilaterally.  Sensory:   Light Touch: Intact - Globally.  Motor:   Bulk and Contour: - Normal.  Tone: - Normal.  Tremor: Not present.  Strength: 5/5 normal muscle strength - All Muscles.                                                        General Assessment of Reflexes: - deep tendon reflexes are normal. Coordination - No Impairment of finger-to-nose but does have some Impairment of rapid alternating movements. Gait - Normal.       Result Review :                 Assessment and Plan    Problem List Items Addressed This Visit        Neuro    Embolic  stroke (CMS/[facility]) - Primary    Current Assessment & Plan     60 year old man with significant history of cardiac disease and atherosclerotic disease with cardioembolic etiology.  He has history of heart attacks with the previous one in 2014 and then had a STEMI in 11/2020 for which he had cardiac cath and 2 stents placed most recently.  When he was being evaluated by his cardiologist (Dr. Peterson) he informed her of trouble with his speech and slurring of his speech and some trouble with dexterity which made her concerned for possible stroke.   She ordered a brain MRI scan which demonstrated multiple old infarcts in different vascular distributions concerning for central or possible cardiac embolic source.   It also demonstrated old microhemorrhages due to possible underlying amyloid angiopathy.  Furthermore his CTA demonstrated complete occlusion of the left ICA.  I discussed these findings with patient and his cardiologist over the phone at the time of the MRI findings and again on prior visit.  He has been on combination of aspirin and prasugrel for the cardiac stents.  He has not had any headaches that he reports on visit today.  He had MATEUS done more recently which demonstrated significant atherosclerotic disease and plaque burden in the ascending aorta with low EF.  This is likely the etiology of his strokes which I discussed with patient and his cardiologist. I reviewed the brain MRI images on his prior visit and agree with the findings as described by radiology.  He is currently on combination of Xarelto, prasugrel and Crestor.  Discussed the risks and benefits of this treatment with patient extensively and discussed our mutual decision made by myself and his cardiologist on visit today with patient at length.  He is agreeable to this treatment decision.  Advised patient to be taken to the ED with any new stroke symptoms or new onset severe headache as this could potentially indicate intracranial hemorrhage  for which he would need to be urgently treated.  He denies any blood in stool or urine or any changes since last visit specifically no new stroke symptoms or headaches.  Will follow up as needed.                     Follow Up   No follow-ups on file.  Patient was given instructions and counseling regarding his condition or for health maintenance advice. Please see specific information pulled into the AVS if appropriate.

## 2021-06-15 NOTE — TELEPHONE ENCOUNTER
Patient was seen today by Dr Lopez and was told to call Dr Peterson and let her know that his pulse has been running low, today it was 45 while in office. Please advise.

## 2021-06-15 NOTE — ASSESSMENT & PLAN NOTE
60 year old man with significant history of cardiac disease and atherosclerotic disease with cardioembolic etiology.  He has history of heart attacks with the previous one in 2014 and then had a STEMI in 11/2020 for which he had cardiac cath and 2 stents placed most recently.  When he was being evaluated by his cardiologist (Dr. Peterson) he informed her of trouble with his speech and slurring of his speech and some trouble with dexterity which made her concerned for possible stroke.   She ordered a brain MRI scan which demonstrated multiple old infarcts in different vascular distributions concerning for central or possible cardiac embolic source.   It also demonstrated old microhemorrhages due to possible underlying amyloid angiopathy.  Furthermore his CTA demonstrated complete occlusion of the left ICA.  I discussed these findings with patient and his cardiologist over the phone at the time of the MRI findings and again on prior visit.  He has been on combination of aspirin and prasugrel for the cardiac stents.  He has not had any headaches that he reports on visit today.  He had MATEUS done more recently which demonstrated significant atherosclerotic disease and plaque burden in the ascending aorta with low EF.  This is likely the etiology of his strokes which I discussed with patient and his cardiologist. I reviewed the brain MRI images on his prior visit and agree with the findings as described by radiology.  He is currently on combination of Xarelto, prasugrel and Crestor.  Discussed the risks and benefits of this treatment with patient extensively and discussed our mutual decision made by myself and his cardiologist on visit today with patient at length.  He is agreeable to this treatment decision.  Advised patient to be taken to the ED with any new stroke symptoms or new onset severe headache as this could potentially indicate intracranial hemorrhage for which he would need to be urgently treated.  He denies  any blood in stool or urine or any changes since last visit specifically no new stroke symptoms or headaches.  Will follow up as needed.

## 2021-08-16 ENCOUNTER — OFFICE VISIT (OUTPATIENT)
Dept: FAMILY MEDICINE CLINIC | Facility: CLINIC | Age: 61
End: 2021-08-16

## 2021-08-16 VITALS
WEIGHT: 200.4 LBS | TEMPERATURE: 97.1 F | RESPIRATION RATE: 18 BRPM | OXYGEN SATURATION: 97 % | DIASTOLIC BLOOD PRESSURE: 78 MMHG | HEIGHT: 72 IN | HEART RATE: 64 BPM | BODY MASS INDEX: 27.14 KG/M2 | SYSTOLIC BLOOD PRESSURE: 108 MMHG

## 2021-08-16 DIAGNOSIS — Z12.12 SCREENING FOR COLORECTAL CANCER: ICD-10-CM

## 2021-08-16 DIAGNOSIS — R25.3 FASCICULATIONS OF MUSCLE: ICD-10-CM

## 2021-08-16 DIAGNOSIS — Z12.11 SCREENING FOR COLORECTAL CANCER: ICD-10-CM

## 2021-08-16 DIAGNOSIS — E78.2 MIXED HYPERLIPIDEMIA: ICD-10-CM

## 2021-08-16 DIAGNOSIS — I25.10 ATHEROSCLEROSIS OF NATIVE CORONARY ARTERY OF NATIVE HEART WITHOUT ANGINA PECTORIS: Primary | ICD-10-CM

## 2021-08-16 DIAGNOSIS — Z79.899 HIGH RISK MEDICATION USE: ICD-10-CM

## 2021-08-16 DIAGNOSIS — I21.02 ST ELEVATION MYOCARDIAL INFARCTION INVOLVING LEFT ANTERIOR DESCENDING (LAD) CORONARY ARTERY (HCC): ICD-10-CM

## 2021-08-16 DIAGNOSIS — I10 ESSENTIAL HYPERTENSION: ICD-10-CM

## 2021-08-16 DIAGNOSIS — E11.59 TYPE 2 DIABETES MELLITUS WITH OTHER CIRCULATORY COMPLICATION, WITHOUT LONG-TERM CURRENT USE OF INSULIN (HCC): ICD-10-CM

## 2021-08-16 PROCEDURE — 99214 OFFICE O/P EST MOD 30 MIN: CPT | Performed by: FAMILY MEDICINE

## 2021-08-17 LAB
ALBUMIN SERPL-MCNC: 4.6 G/DL (ref 3.5–5.2)
ALBUMIN/GLOB SERPL: 1.8 G/DL
ALP SERPL-CCNC: 57 U/L (ref 39–117)
ALT SERPL-CCNC: 14 U/L (ref 1–41)
AST SERPL-CCNC: 15 U/L (ref 1–40)
BASOPHILS # BLD AUTO: 0.07 10*3/MM3 (ref 0–0.2)
BASOPHILS NFR BLD AUTO: 0.9 % (ref 0–1.5)
BILIRUB SERPL-MCNC: 0.3 MG/DL (ref 0–1.2)
BUN SERPL-MCNC: 15 MG/DL (ref 8–23)
BUN/CREAT SERPL: 12.2 (ref 7–25)
CALCIUM SERPL-MCNC: 9.9 MG/DL (ref 8.6–10.5)
CHLORIDE SERPL-SCNC: 104 MMOL/L (ref 98–107)
CHOLEST SERPL-MCNC: 152 MG/DL (ref 0–200)
CO2 SERPL-SCNC: 24.4 MMOL/L (ref 22–29)
CREAT SERPL-MCNC: 1.23 MG/DL (ref 0.76–1.27)
EOSINOPHIL # BLD AUTO: 0.15 10*3/MM3 (ref 0–0.4)
EOSINOPHIL NFR BLD AUTO: 1.8 % (ref 0.3–6.2)
ERYTHROCYTE [DISTWIDTH] IN BLOOD BY AUTOMATED COUNT: 13 % (ref 12.3–15.4)
GLOBULIN SER CALC-MCNC: 2.5 GM/DL
GLUCOSE SERPL-MCNC: 131 MG/DL (ref 65–99)
HBA1C MFR BLD: 6.5 % (ref 4.8–5.6)
HCT VFR BLD AUTO: 45.1 % (ref 37.5–51)
HDLC SERPL-MCNC: 31 MG/DL (ref 40–60)
HGB BLD-MCNC: 15.1 G/DL (ref 13–17.7)
IMM GRANULOCYTES # BLD AUTO: 0.02 10*3/MM3 (ref 0–0.05)
IMM GRANULOCYTES NFR BLD AUTO: 0.2 % (ref 0–0.5)
LDLC SERPL CALC-MCNC: 81 MG/DL (ref 0–100)
LYMPHOCYTES # BLD AUTO: 2.09 10*3/MM3 (ref 0.7–3.1)
LYMPHOCYTES NFR BLD AUTO: 25.4 % (ref 19.6–45.3)
MCH RBC QN AUTO: 30.4 PG (ref 26.6–33)
MCHC RBC AUTO-ENTMCNC: 33.5 G/DL (ref 31.5–35.7)
MCV RBC AUTO: 90.9 FL (ref 79–97)
MONOCYTES # BLD AUTO: 0.75 10*3/MM3 (ref 0.1–0.9)
MONOCYTES NFR BLD AUTO: 9.1 % (ref 5–12)
NEUTROPHILS # BLD AUTO: 5.14 10*3/MM3 (ref 1.7–7)
NEUTROPHILS NFR BLD AUTO: 62.6 % (ref 42.7–76)
NRBC BLD AUTO-RTO: 0 /100 WBC (ref 0–0.2)
PLATELET # BLD AUTO: 160 10*3/MM3 (ref 140–450)
POTASSIUM SERPL-SCNC: 5.1 MMOL/L (ref 3.5–5.2)
PROT SERPL-MCNC: 7.1 G/DL (ref 6–8.5)
RBC # BLD AUTO: 4.96 10*6/MM3 (ref 4.14–5.8)
SODIUM SERPL-SCNC: 137 MMOL/L (ref 136–145)
TRIGL SERPL-MCNC: 238 MG/DL (ref 0–150)
VLDLC SERPL CALC-MCNC: 40 MG/DL (ref 5–40)
WBC # BLD AUTO: 8.22 10*3/MM3 (ref 3.4–10.8)

## 2021-08-24 ENCOUNTER — OFFICE VISIT (OUTPATIENT)
Dept: NEUROLOGY | Facility: CLINIC | Age: 61
End: 2021-08-24

## 2021-08-24 VITALS
SYSTOLIC BLOOD PRESSURE: 116 MMHG | WEIGHT: 200 LBS | HEART RATE: 63 BPM | DIASTOLIC BLOOD PRESSURE: 68 MMHG | HEIGHT: 72 IN | OXYGEN SATURATION: 98 % | BODY MASS INDEX: 27.09 KG/M2

## 2021-08-24 DIAGNOSIS — R25.3 FASCICULATIONS OF MUSCLE: Primary | ICD-10-CM

## 2021-08-24 PROCEDURE — 99215 OFFICE O/P EST HI 40 MIN: CPT | Performed by: PSYCHIATRY & NEUROLOGY

## 2021-08-24 NOTE — PATIENT INSTRUCTIONS
"Arturo's neurology in clinical practice (7th ed., pp. 1743-8239). [place], [place]: Elsevier.\">   Amyotrophic Lateral Sclerosis  Amyotrophic lateral sclerosis (ALS), also called Selam Gehrig's disease, is a disease of the brain and spinal cord (nervous system). ALS causes a gradual loss of the nerve cells, or neurons, that move voluntary muscles. Voluntary muscles are the muscles that a person can control, such as muscles in the arms, legs, and lungs. In ALS, neuron loss causes the muscles to eventually stop working and to waste away (atrophy).  ALS is a condition that gets worse over time (is progressive) and eventually leads to death. It can affect personality and memory and lead to a condition called frontotemporal dementia. There is no cure for ALS, but treatment can help you live longer and improve your quality of life.  What are the causes?  The cause of most cases of ALS is not known. In a small number of cases, ALS is passed from parent to child (inherited).  What increases the risk?  The following factors may make you more likely to develop this condition:  · Having a family history of ALS.  · Being male.  · Being 60-69 years old.  What are the signs or symptoms?  Signs and symptoms of ALS start slowly. Early symptoms may include:  · Weakness in the arms or legs.  · Slurred speech.  · Muscle twitches.  · Trouble swallowing.  · Cramps.  · Sudden muscle tightening, or spasms.  Over time, signs and symptoms become more noticeable. These may be:  · Physical symptoms. These include:  ? Muscle atrophy. Muscles may look smaller than normal.  ? Being unable to speak or swallow.  ? Trouble breathing.  ? Being unable to move your arms or legs.  ? Weight loss.  ? Excessive drooling.  · Mental and emotional symptoms. These include:  ? Laughing or crying that cannot be controlled.  ? Anxiety and depression.  ? Changes in behavior.  ? Problems with thinking and memory.  How is this diagnosed?  There is no test to diagnose " this condition, but testing can help rule out other possible causes of your symptoms. This condition may be diagnosed based on:  · Your symptoms. ALS may be diagnosed if symptoms get worse over time and involve both the neurons in your brain (upper motor neurons) and neurons in your spinal cord (lower motor neurons).  · A neurologic exam in which your strength, reflexes, sensation, and coordination are tested.  · Nerve conduction studies and electromyogram. These are tests that record muscle activity and check how well your nerves send signals.  · Imaging studies of your brain and spinal cord, such as an MRI or a CT scan.  · Blood tests.  · Genetic testing, if you have a family history of ALS.  How is this treated?  There is no cure for this condition, but treatment can slow the progression of the disease and improve your quality of life. You may work with a team of health care providers that includes therapists, nutrition specialists, and social workers. Treatment may include:  · Medicines. These can:  ? Slow the progression of the disease.  ? Reduce muscle cramps or spasms.  ? Relieve anxiety, depression, or pseudobulbar affect.  · Therapy to improve quality of life. This may include:  ? Physical therapy exercises to help keep your muscles flexible.  ? Occupational therapy to help with everyday activities, such as eating and dressing.  ? Speech therapy to help with communication and to make sure you can swallow safely.  · Devices to help you breathe more easily and devices to help you move around.  · A feeding tube to supplement your diet and prevent weight loss.  Follow these instructions at home:  Working with your health care providers    · Work with your team of health care providers to make a plan for home care that meets your needs. Your needs may change over time.  · If you have symptoms of anxiety or depression, work with a mental health care provider.  · In some cases, your health care provider may  recommend working with a  to make sure you have the support you need at home.  Activity  · Exercise daily, if you are able. Work with a physical therapist to make an exercise program that includes:  ? Stretching and range-of-motion exercises.  ? Aerobic exercises, such as swimming or walking. These exercises can help strengthen muscles that are not affected by ALS.  · Make your home safe and easy for you to get around, and take actions to prevent falls.  · Use devices to help you move around as told by your health care provider.  General instructions  · Take over-the-counter and prescription medicines only as told by your health care provider.  · Do not use any products that contain nicotine or tobacco, such as cigarettes, e-cigarettes, and chewing tobacco. If you need help quitting, ask your health care provider.  · Work with a dietitian to maintain a healthy diet.  · Have a safety plan for emergencies, such as a safety alert system to get help quickly.  · Make sure you have a strong, ongoing support system and care at home. Ask your health care provider what resources may be needed as your condition progresses.  · Keep all follow-up visits as told by your health care provider. This is important.  Where to find more information  · ALS Association: www.alsa.org  · Muscular Dystrophy Association: www.mda.org  Contact a health care provider if:  · You cannot care for yourself at home.  · You have symptoms of anxiety or depression.  Get help right away if:  · You have a fever.  · You cannot swallow foods or liquids.  · You choke on foods or liquids.  · You have trouble breathing.  These symptoms may represent a serious problem that is an emergency. Do not wait to see if the symptoms will go away. Get medical help right away. Call your local emergency services (911 in the [place] Do not drive yourself to the hospital.  If you ever feel like you may hurt yourself or others, or have thoughts about taking your  own life, get help right away. Go to your nearest emergency department or:  · Call your local emergency services (911 in the [place]  · Call a suicide crisis helpline, such as the National Suicide Prevention Lifeline at 1-948.310.3059. This is open 24 hours a day in the [place]  · Text the Crisis Text Line at 927595 (in the [place]  Summary  · Amyotrophic lateral sclerosis (ALS) is a disease of the nerves that control voluntary muscles. ALS leads to weakness in the arms and legs, slurred speech, problems swallowing, and difficulty breathing.  · ALS is diagnosed through a review of your symptoms, a neurologic exam, imaging tests, and other tests.  · Treatment may include medicines, therapy to improve quality of life, and devices to help you breathe and move more easily.  · Make sure you have a strong, ongoing support system and care at home. Ask your health care provider what resources may be needed as your condition progresses.  This information is not intended to replace advice given to you by your health care provider. Make sure you discuss any questions you have with your health care provider.  Document Revised: 10/30/2020 Document Reviewed: 10/30/2020  Elsevier Patient Education © 2021 [employer].

## 2021-08-24 NOTE — PROGRESS NOTES
Chief Complaint  Cerebrovascular Accident (LV: 6-15--here for f/u on ALS)    Subjective          Sylvain Amador presents to [facility] for   HISTORY OF PRESENT ILLNESS:    Sylvain Amador is 61 year old man with significant history of cardiac disease and atherosclerotic disease who returns to neurology clinic for follow up evaluation and treatment of stroke and concerns for cardioembolic etiology which has been stable and today specifically more recent concern for fasciculations and progressive weakness and loss of muscle mass.  He has history of heart attacks with the previous one in 2014 and then had a STEMI in 11/2020 for which he had cardiac cath and 2 stents placed most recently.  When he was being evaluated by his cardiologist (Dr. Peterson) he informed her of trouble with his speech and slurring of his speech and some trouble with dexterity which made her concerned for possible stroke.   She ordered a brain MRI scan which demonstrated multiple old infarcts in different vascular distributions concerning for central or possible cardiac embolic source.   It also demonstrated old microhemorrhages due to possible underlying amyloid angiopathy.  Furthermore his CTA demonstrated complete occlusion of the left ICA.  I discussed these findings with patient and his cardiologist over the phone at the time of the MRI findings.  He has been on combination of Xarelto and prasugrel for the cardiac stents most recently.  He has not had any headaches that he reports on visit today.  He had MATEUS done more recently which demonstrated significant atherosclerotic disease and plaque burden in the ascending aorta with low EF.  This is likely the etiology of his strokes which I discussed with patient and his cardiologist on prior visit.  He denies any blood in stool or urine.  He denies any significant headaches.  He reports smoking 1/2 to pack per day.  Today the more concerning findings is that he  was noticed by his PCP to have fasciculations involving bilateral upper extremities and he was referred to me for further evaluation of ALS.  He has had a more nasal speech which has progressively become weaker and he tells me he has to speak slowly.  He has been getting tired more easily and he has noticed loss of muscle mass.  He denies choking on food.  He does report he will have a strand of spaghetti going down his throat without swallowing.  He feels like he has been slowly and progressively getting weaker.  He denies family history of ALS.  He does admit to feeling more short of breath today than a year ago.      Past Medical History:   Diagnosis Date   • CAD (coronary artery disease)    • Cataract    • Diabetes mellitus (CMS/[facility])    • Diabetic neuropathy (CMS/[facility])    • GERD (gastroesophageal reflux disease)    • Hyperlipidemia    • Hypertension    • Intermittent claudication (CMS/[facility])    • PAD (peripheral artery disease) (CMS/[facility])     of lower extremities   • Recovering alcoholic (CMS/[facility])    • STEMI (ST elevation myocardial infarction) (CMS/[facility]) 11/2020        Family History   Problem Relation Age of Onset   • Diabetes Mother    • Hypertension Mother    • Pancreatic cancer Mother    • Cancer Mother    • Esophageal cancer Father    • Cancer Father    • Esophageal cancer Brother    • Cancer Brother    • Cancer Paternal Grandmother    • Diabetes Paternal Grandmother    • Alcohol abuse Brother    • Cancer Brother         Social History     Socioeconomic History   • Marital status:      Spouse name: Not on file   • Number of children: Not on file   • Years of education: Not on file   • Highest education level: Not on file   Tobacco Use   • Smoking status: Current Every Day Smoker     Packs/day: 0.50   • Smokeless tobacco: Current User   Substance and Sexual Activity   • Alcohol use: Yes     Comment: Seldom/ Caffeine use: 8-10 cups daily   • Drug use: No   • Sexual activity: Defer        I have personally  "reviewed the ROS as stated below.     Review of Systems   Neurological: Positive for speech difficulty. Negative for dizziness, tremors, seizures, syncope, facial asymmetry, weakness, light-headedness, numbness, headache, memory problem and confusion.   Hematological: Does not bruise/bleed easily.   Psychiatric/Behavioral: Negative for agitation, behavioral problems, decreased concentration, dysphoric mood, hallucinations, self-injury, sleep disturbance, suicidal ideas, negative for hyperactivity, depressed mood and stress. The patient is not nervous/anxious.         Objective   Vital Signs:   /68   Pulse 63   Ht 182 cm (71.65\")   Wt 90.7 kg (200 lb)   SpO2 98%   BMI 27.39 kg/m²       PHYSICAL EXAM:    General   Mental Status - Alert. General Appearance - Well developed, Well groomed, Oriented and Cooperative. Orientation - Oriented X3.                            Head and Neck  Head - normocephalic, atraumatic with no lesions or palpable masses.  Neck                 Global Assessment - supple.                                         Eye   Sclera/Conjunctiva - Bilateral - Normal.     ENMT  Mouth and Throat   Oral Cavity/Oropharynx: Oropharynx - the soft palate,uvula and tongue are normal in appearance.     Chest and Lung Exam   Chest - lung clear to auscultation bilaterally.     Cardiovascular   Cardiovascular examination reveals  - normal heart sounds, regular rate and rhythm.     Neurologic   Mental Status: Speech - Slurred and Nasal speech. Cognitive function - appropriate fund of knowledge. No impairment of attention, Impairment of concentration, impairment of long term memory or impairment of short term memory.  Cranial Nerves:   II Optic: Visual acuity - Left - Normal. Right - Normal. Visual fields - Normal (to confrontation).  III Oculomotor: Pupillary constriction - Left - Normal. Right - Normal.  VII Facial: - Normal Bilaterally.  VIII Acoustic - Bilateral - Hearing normal and (Hearing tested by " "finger rub).   IX Glossopharyngeal / X Vagus - Normal.  XI Accessory: Trapezius - Bilateral - Normal. Sternocleidomastoid - Bilateral - Normal.  XII Hypoglossal - Bilateral - Normal.  Fasciculations noted on tongue.    Eye Movements: - Normal Bilaterally.  Sensory:   Light Touch: Intact - Globally.  Motor:   Bulk and Contour: - Reduced.  Tone: - Normal.  Tremor: Not present.  Strength: 4/5 normal muscle strength - Reduced strength noted in upper extremities with atrophy noted in the proximal muscles.  Fasciculations noted in bilateral upper extremities.                                                    General Assessment of Reflexes: - deep tendon reflexes are increased. Coordination - No Impairment of finger-to-nose but does have some Impairment of rapid alternating movements. Gait - Normal.        Result Review :                 Assessment and Plan    Problem List Items Addressed This Visit        Neuro    Fasciculations of muscle - Primary    Current Assessment & Plan     61 year old man with fasciculations of his muscles noted more recently along with changes in voice with more \"nasal\" voice, he does have increased reflexes and fasciculations observed in bilateral upper extremities and on his tongue on my visit today.  He does have some atrophy of his proximal muscles and some reduced  strength as well.  He is also clearing his throat on visit today.  All of these findings is concerning for possible ALS.  I spoke with Dr. Estrada on the phone and will get Sylvain in asap for an EMG/NCS of his more symptomatic extremities including bilateral upper extremities and left lower extremity.  I will order that today and have it done ASAP and will refer him to the ALS comprehensive center at [facility] [place] with Dr. Khurram Flores for further evaluation and treatment.  Discussed the diagnosis of ALS at length and provided patient education information on his visit today.           Relevant Orders    EMG & Nerve Conduction Test    " Ambulatory Referral to Neurology          I spent 53 minutes caring for Sylvain on this date of service. This time includes time spent by me in the following activities:preparing for the visit, reviewing tests, obtaining and/or reviewing a separately obtained history, performing a medically appropriate examination and/or evaluation , counseling and educating the patient/family/caregiver, ordering medications, tests, or procedures, documenting information in the medical record and care coordination    Follow Up   Return if symptoms worsen or fail to improve.  Patient was given instructions and counseling regarding his condition or for health maintenance advice. Please see specific information pulled into the AVS if appropriate.

## 2021-08-24 NOTE — ASSESSMENT & PLAN NOTE
Orthopedic Progress Note    Subjective :   Patient seen by cardiology yesterday.  They have cleared him for surgery.  Plan is for OR today around 2 PM with Dr. Ravi.  Patient currently n.p.o.    Objective :    Vital signs in last 24 hours:  Temp:  [96.8 °F (36 °C)-98.1 °F (36.7 °C)] 97 °F (36.1 °C)  Heart Rate:  [72-86] 72  Resp:  [16] 16  BP: (120-177)/(68-90) 120/68  Vitals:    12/02/20 1500 12/02/20 1900 12/02/20 2300 12/03/20 0300   BP: 177/86 121/76 124/73 120/68   BP Location: Right arm Right arm Right arm Right arm   Patient Position: Lying Lying Lying Lying   Pulse: 86 78 76 72   Resp: 16 16 16 16   Temp: 98.1 °F (36.7 °C) 97.7 °F (36.5 °C) 97 °F (36.1 °C) 97 °F (36.1 °C)   TempSrc: Skin Skin Skin Skin   SpO2: 94% 94% 94% 92%   Weight:       Height:           PHYSICAL EXAM:  Patient is calm, in no acute distress, awake and oriented x 3.  Lower extremity is slightly shortened and externally rotated.  No appreciable skin lesions.  Pain with logroll.  EHL, FHL, TA, GS are intact.  DP/TP are 2+.    LABS:  Results from last 7 days   Lab Units 12/03/20  0537   WBC 10*3/mm3 15.17*   HEMOGLOBIN g/dL 14.0   HEMATOCRIT % 41.2   PLATELETS 10*3/mm3 166     Results from last 7 days   Lab Units 12/03/20  0537   SODIUM mmol/L 135*   POTASSIUM mmol/L 4.7   CHLORIDE mmol/L 98   CO2 mmol/L 27.3   BUN mg/dL 46*   CREATININE mg/dL 1.59*   GLUCOSE mg/dL 142*   CALCIUM mg/dL 8.9     Results from last 7 days   Lab Units 12/01/20  1556   INR  1.06   APTT seconds 31.3       ASSESSMENT:  Left hip intertrochanteric fracture with extension into the femoral neck status post mechanical fall    Plan:  Remain n.p.o.  We will plan for left hip intramedullary nail fixation with surgery as indicated today.  Patient has been cleared for surgery.  Risk, benefits, and outcomes of surgery were discussed with the patient.  He understands these risks.  Wishes to proceed.    Fer Cunningham PA-C    Date: 12/3/2020  Time: 06:45 EST   "61 year old man with fasciculations of his muscles noted more recently along with changes in voice with more \"nasal\" voice, he does have increased reflexes and fasciculations observed in bilateral upper extremities and on his tongue on my visit today.  He does have some atrophy of his proximal muscles and some reduced  strength as well.  He is also clearing his throat on visit today.  All of these findings is concerning for possible ALS.  I spoke with Dr. Estrada on the phone and will get Sylvain in asap for an EMG/NCS of his more symptomatic extremities including bilateral upper extremities and left lower extremity.  I will order that today and have it done ASAP and will refer him to the ALS comprehensive center at [facility] with Dr. Khurram Flores for further evaluation and treatment.  Discussed the diagnosis of ALS at length and provided patient education information on his visit today.    "

## 2021-08-30 ENCOUNTER — TELEPHONE (OUTPATIENT)
Dept: NEUROLOGY | Facility: CLINIC | Age: 61
End: 2021-08-30

## 2021-08-30 NOTE — TELEPHONE ENCOUNTER
Tried calling pt and wife to see if pt can make it today or tomorrow at [facility] for an EMG that Dr. Lopez ordered, if pt calls back, please transfer to [place] to Caren Whittaker

## 2021-08-30 NOTE — TELEPHONE ENCOUNTER
Caller: Sylvain Amador    Relationship: Self    Best call back number: [phone]    What is the best time to reach you:  ANY     Who are you requesting to speak with (clinical staff, provider,  specific staff member):  DID NOT KNOW     Do you know the name of the person who called:  CESILIA LEZAMA     What was the call regarding: EMG NCV SCHEDULING     Do you require a callback: YES       PLEASE ADVISE    N/A

## 2021-08-30 NOTE — TELEPHONE ENCOUNTER
Provider: SULMA  Caller: PT  Relationship to Patient: SELF  Pharmacy: N/A  Phone Number: [phone]  Reason for Call: PT TEL TO TRE EMG NCV TEST; NO AVAILABLE APPTS;    PLEASE ADVISE.    THANK YOU.

## 2021-08-30 NOTE — TELEPHONE ENCOUNTER
Spoke with liliana, pt is scheduled and taken care of. Lvm for pt to make sure he didn't need anything more

## 2021-08-31 ENCOUNTER — OFFICE VISIT (OUTPATIENT)
Dept: CARDIOLOGY | Facility: CLINIC | Age: 61
End: 2021-08-31

## 2021-08-31 ENCOUNTER — PROCEDURE VISIT (OUTPATIENT)
Dept: NEUROLOGY | Facility: CLINIC | Age: 61
End: 2021-08-31

## 2021-08-31 VITALS
SYSTOLIC BLOOD PRESSURE: 120 MMHG | HEART RATE: 72 BPM | HEIGHT: 72 IN | WEIGHT: 200 LBS | BODY MASS INDEX: 27.09 KG/M2 | DIASTOLIC BLOOD PRESSURE: 68 MMHG

## 2021-08-31 VITALS — BODY MASS INDEX: 28 KG/M2 | WEIGHT: 200 LBS | HEIGHT: 71 IN

## 2021-08-31 DIAGNOSIS — E11.42 DIABETIC PERIPHERAL NEUROPATHY (HCC): Primary | ICD-10-CM

## 2021-08-31 DIAGNOSIS — I25.10 ATHEROSCLEROSIS OF NATIVE CORONARY ARTERY OF NATIVE HEART WITHOUT ANGINA PECTORIS: Primary | ICD-10-CM

## 2021-08-31 DIAGNOSIS — I71.40 ABDOMINAL AORTIC ANEURYSM (AAA) WITHOUT RUPTURE (HCC): ICD-10-CM

## 2021-08-31 DIAGNOSIS — R25.3 FASCICULATIONS OF MUSCLE: ICD-10-CM

## 2021-08-31 DIAGNOSIS — E78.2 MIXED HYPERLIPIDEMIA: ICD-10-CM

## 2021-08-31 DIAGNOSIS — I10 ESSENTIAL HYPERTENSION: ICD-10-CM

## 2021-08-31 DIAGNOSIS — I25.5 ISCHEMIC CARDIOMYOPATHY: ICD-10-CM

## 2021-08-31 PROCEDURE — 95886 MUSC TEST DONE W/N TEST COMP: CPT | Performed by: PSYCHIATRY & NEUROLOGY

## 2021-08-31 PROCEDURE — 95911 NRV CNDJ TEST 9-10 STUDIES: CPT | Performed by: PSYCHIATRY & NEUROLOGY

## 2021-08-31 PROCEDURE — 93000 ELECTROCARDIOGRAM COMPLETE: CPT | Performed by: INTERNAL MEDICINE

## 2021-08-31 PROCEDURE — 99214 OFFICE O/P EST MOD 30 MIN: CPT | Performed by: INTERNAL MEDICINE

## 2021-08-31 NOTE — PROGRESS NOTES
Date of Office Visit: 2021  Encounter Provider: Germania Peterson MD  Place of Service: [facility] CARDIOLOGY  Patient Name: Sylvain Amador  :1960      Patient ID:  Sylvain Amador is a 61 y.o. male is here for  followup for CAD        History of Present Illness    He was seen in 10/2014 for chest pain after he had made an emergency department visit. When I saw him, I was concerned this was  coronary ischemia, so we set him up for a stress nuclear perfusion study done 10/24/2014,  showing a small infarct with a moderate amount of anuradha-infarct ischemia in the inferior  wall. Ejection fraction was 46%. He also had complaints of lower extremity cramping with  walking. He would have to stop and rest before he would be able to go on. He had a lower  extremity arterial duplex study done 10/24/2014, showing a severe obstructive disease  involving the left and right common femoral and superficial femoral artery systems.      On 10/28/2014, he underwent cardiac catheterization, which showed 40% diffuse  [id number] LAD stenosis, 90% discrete OM-1 stenosis, 90% discrete mid-RCA stenosis, 40%  distal RCA stenosis of the bifurcation. He had plain old balloon angioplasty at the first  obtuse marginal branch, and he received a 3.5 x 15 mm Xience Xpedition drug-eluting stent  to the mid-RCA. The OM-1 branch did not get a stent. His ejection fraction at cath was  normal.      He then on 2014 had a CTA of the abdomen and pelvis with lower extremity runoff.  This showed completely occlusion of the superficial femoral arteries bilaterally with  reconstitution of popliteal arteries via the profunda femoral collaterals. There was also  a fusiform infrarenal aortic aneurysm measuring 3.5 cm.      He went on to see Dr. Reyes because of the claudication in his legs, which he still  has.  Dr. Reyes recommended angiography which the patient had done on 2015.  This  showed  atherosclerotic disease of the aorta which was moderate and diffuse.  SFA was  occluded on the right.  SFA was occluded on the left.  Mild disease in the common femoral  artery bilaterally.  Popliteal artery was normal; reconstitution with two vessel runoff to  both of his feet.  There was a mild infrarenal abdominal aortic aneurysm.  Dr. Reyes said  there was nothing more he could do and recommended that the patient go on to see vascular  surgery. He did see Dr. Arturo Huynh who said that he really  cannot do surgery outside of vein transplantation which does not last. He has elected to  really go after the lifestyle changes, and he is doing better.      He had lower extremity arterial duplex studies done on  05/11/2015. The right ankle brachial index showed several arterial occlusive disease with  severe digital ischemia of the right toe. The atrial occlusive disease in the right leg  was in the aortoiliac and femoral segments. The ankle brachial index on the left showed  moderate arterial occlusive disease with moderate digital ischemia and moderate occlusive  disease of the aortoiliac and femoral segments.      His wife has had breast cancer.      In November 2020, he presented to the [facility] ED with severe midsternal chest pain radiating to his right arm with nausea.  He was diagnosed with a STEMI and underwent drug-eluting stent placement to the proximal to mid LAD (3.25 x 33) and mid to distal LAD (2.75 x 12), and ostial RCA (4 x 15).  Prior to the revascularization of the LAD he had ventricular tachycardia and received 1 shock.  His ejection fraction was 40%.  His lisinopril was discontinued and he was started on Entresto.       He has stress study done 1/13/2021 which showed ejection fraction reduces 27% with a medium size infarct in the apex with moderate anuradha-infarct ischemia and this was a change from a stress study done 10/6/2020.  An echocardiogram done 1/27/2021 showing ejection fraction 35% with  apical akinesis and septal akinesis.  His diastolic function was normal.  There is no significant valve disease.  He had a repeat cardiac catheterization done 2/4/2021 showing patent LAD and RCA stents and normal LV filling pressures, medical management recommended.     Saw him in the office on 1/27/2021 and he had slurred speech.  I recommend that he have an MRI done which performed 2/23/2021 and this showed multiple tiny old lacunar infarcts, multiple tiny right and left PICA territory infarcts, multiple hemosiderin deposits.  There is also suggestion of occlusion of the left internal carotid artery.  CTA of the head neck was then done on 2/14/21 showing occlusion of the left internal carotid artery at the origin and reconstituting at the supraclinoid segment.  There was no significant stenosis of the right internal carotid artery.  Multiple chronic infarcts were noted in multiple vascular distributions.  He had MATEUS done 3/3/2021 which showed severe calcified plaque at the sinotubular junction and into the proximal ascending aorta as well severe plaques in the descending thoracic aorta.  In the aortic arch, there was a severe plaque with a mobile structure on it.  His ejection fraction was 36 to 40% with akinesis of the anterior septum, anterior apex, septal apex and distal anterior wall.  His saline contrast study was negative.  There was a very small pericardial effusion and grade 2 diastolic dysfunction.  His rosuvastatin was increased to 40 mg daily.  He saw Dr. Lopez with neurology on 3/5/2021 and was recommended at that time to start Xarelto 20 mg daily.  Aspirin was discontinued then and and he remains on prasugrel.     Labs in 8/16/2021 show glucose 131, otherwise normal CMP, hemoglobin A1c 6.5, total cholesterol 52, HDL 31, LDL 81 triglycerides 238, normal CBC.  He may now have ALS.  He still follows with .  He is now on Medicaid.  Is seem to be covering his medical bills well.  He has no  chest pain or pressure.  He is chronically short winded.  He continues to smoke a pack cigarettes a day.  He does not feels heart racing or skipping is had no dizziness or syncope.  His voice is weak and is hoarse.  He also has very little strength.  He has not seen vascular in over a year.    Past Medical History:   Diagnosis Date   • CAD (coronary artery disease)    • Cataract    • Diabetes mellitus (CMS/[facility])    • Diabetic neuropathy (CMS/[facility])    • GERD (gastroesophageal reflux disease)    • Hyperlipidemia    • Hypertension    • Intermittent claudication (CMS/[facility])    • PAD (peripheral artery disease) (CMS/[facility])     of lower extremities   • Recovering alcoholic (CMS/[facility])    • STEMI (ST elevation myocardial infarction) (CMS/[facility]) 11/2020         Past Surgical History:   Procedure Laterality Date   • ARTERIOGRAM AORTIC Left 8/2/2018    Procedure: AORTIC AND LEFT ILIAC ARTERY ANEURYSM REPAIR;  Surgeon: Arturo Huynh MD;  Location: [facility] MAIN OR;  Service: Vascular   • CARDIAC CATHETERIZATION  11/2015   • CARDIAC CATHETERIZATION N/A 11/22/2020    Procedure: Left Heart Cath;  Surgeon: Suraj Vaughn MD;  Location: [facility] CATH INVASIVE LOCATION;  Service: Cardiovascular;  Laterality: N/A;   • CARDIAC CATHETERIZATION N/A 11/22/2020    Procedure: Percutaneous Coronary Intervention;  Surgeon: Suraj Vaughn MD;  Location: [facility] CATH INVASIVE LOCATION;  Service: Cardiovascular;  Laterality: N/A;   • CARDIAC CATHETERIZATION N/A 11/22/2020    Procedure: Stent JARRED coronary;  Surgeon: Suraj Vaughn MD;  Location: [facility] CATH INVASIVE LOCATION;  Service: Cardiovascular;  Laterality: N/A;   • CARDIAC CATHETERIZATION N/A 11/22/2020    Procedure: Coronary angiography;  Surgeon: Suraj Vaughn MD;  Location: [facility] CATH INVASIVE LOCATION;  Service: Cardiovascular;  Laterality: N/A;   • CARDIAC CATHETERIZATION N/A 2/4/2021    Procedure: Coronary angiography;  Surgeon: Stefany Dawn MD;   Location:  [facility] CATH INVASIVE LOCATION;  Service: Cardiovascular;  Laterality: N/A;   • CARDIAC CATHETERIZATION N/A 2/4/2021    Procedure: Left Heart Cath;  Surgeon: Stefany Dawn MD;  Location: [facility] CATH INVASIVE LOCATION;  Service: Cardiovascular;  Laterality: N/A;   • CARDIAC ELECTROPHYSIOLOGY PROCEDURE N/A 11/22/2020    Procedure: Cardioversion/Defibrillation;  Surgeon: Suraj Vaughn MD;  Location: [facility] CATH INVASIVE LOCATION;  Service: Cardiovascular;  Laterality: N/A;   • CORONARY ANGIOPLASTY WITH STENT PLACEMENT  2015   • RHINOPLASTY  1975   • VASECTOMY         Current Outpatient Medications on File Prior to Visit   Medication Sig Dispense Refill   • carvedilol (COREG) 25 MG tablet Take 1 tablet by mouth 2 (Two) Times a Day With Meals. 180 tablet 3   • metFORMIN (GLUCOPHAGE) 500 MG tablet Take 2 tablets by mouth 2 (Two) Times a Day With Meals. 360 tablet 3   • prasugrel (EFFIENT) 10 MG tablet Take 1 tablet by mouth Daily. 30 tablet 11   • rivaroxaban (Xarelto) 20 MG tablet Take 1 tablet by mouth Daily With Dinner. 90 tablet 3   • rosuvastatin (CRESTOR) 40 MG tablet Take 1 tablet by mouth Daily. 90 tablet 3   • sacubitril-valsartan (Entresto) 49-51 MG tablet Take 1 tablet by mouth 2 (Two) Times a Day. 180 tablet 3   • triamcinolone (KENALOG) 0.1 % ointment Apply  topically to the appropriate area as directed 2 (Two) Times a Day. 30 g 5     No current facility-administered medications on file prior to visit.       Social History     Socioeconomic History   • Marital status:      Spouse name: Not on file   • Number of children: Not on file   • Years of education: Not on file   • Highest education level: Not on file   Tobacco Use   • Smoking status: Current Every Day Smoker     Packs/day: 1.00     Types: Cigarettes   • Smokeless tobacco: Current User   Substance and Sexual Activity   • Alcohol use: Yes     Comment: Seldom/ Caffeine use: 8-10 cups daily   • Drug use: No   • Sexual activity:  "Defer           ROS    Procedures    ECG 12 Lead    Date/Time: 8/31/2021 7:48 AM  Performed by: Germania Peterson MD  Authorized by: Germania Peterson MD   Comparison: compared with previous ECG   Similar to previous ECG  Rhythm: sinus rhythm  Q waves: V2, V3 and V4      Clinical impression: abnormal EKG                Objective:      Vitals:    08/31/21 0743   BP: 120/68   BP Location: Left arm   Pulse: 72   Weight: 90.7 kg (200 lb)   Height: 182 cm (71.65\")     Body mass index is 27.39 kg/m².    Vitals reviewed.   Constitutional:       General: Not in acute distress.     Appearance: Well-developed. Not diaphoretic.   Eyes:      General: No scleral icterus.     Conjunctiva/sclera: Conjunctivae normal.   HENT:      Head: Normocephalic and atraumatic.   Neck:      Thyroid: No thyromegaly.      Vascular: No carotid bruit or JVD.      Lymphadenopathy: No cervical adenopathy.   Pulmonary:      Effort: Pulmonary effort is normal. No respiratory distress.      Breath sounds: Decreased breath sounds present. No wheezing. No rhonchi. No rales.   Chest:      Chest wall: Not tender to palpatation.   Cardiovascular:      Normal rate. Regular rhythm.      Murmurs: There is no murmur.      No gallop.   Pulses:     Intact distal pulses.   Edema:     Peripheral edema absent.   Abdominal:      General: Bowel sounds are normal. There is no distension or abdominal bruit.      Palpations: Abdomen is soft. There is no abdominal mass.      Tenderness: There is no abdominal tenderness.   Musculoskeletal:         General: No deformity.      Extremities: No clubbing present.     Cervical back: Neck supple. Skin:     General: Skin is warm and dry. There is no cyanosis.      Coloration: Skin is not pale.      Findings: No rash.   Neurological:      Mental Status: Alert and oriented to person, place, and time.      Cranial Nerves: No cranial nerve deficit.   Psychiatric:         Judgment: Judgment normal.         Lab Review:     "   Assessment:      Diagnosis Plan   1. Atherosclerosis of native coronary artery of native heart without angina pectoris     2. Ischemic cardiomyopathy     3. Essential hypertension     4. Mixed hyperlipidemia     5. Abdominal aortic aneurysm (AAA) without rupture (CMS/HCC)       1. CAD, s/p POBA to OM1 and PCI with stent to mid RCA, ostial RCA, proximal LAD and mid LAD, no angina.   Stress nuclear perfusion study in 1/21 shows apical ischemia.    Repeat cardiac catheterization done 2/4/2021 showed patent RCA and LAD stents.  2. Peripheral arterial disease. Occlusion of bilateral superficial femoral arteries. Sees Dr. Huynh.  Is treating this with lifestyle changes and is claudication has improved.   3. Smoking. He is smoking.   Advised cessation of smoking.  4. Hyperlipidemia, on rosuvastatin  5. HTN, well controlled.   6.  Ischemic cardiomyopathy, ejection fraction 35-40%.  7.    Multiple strokes in multiple vascular distributions, now on Xarelto and high-dose rosuvastatin.  This is the source of his slurred speech.  He follows with Dr. Lopez from neurology.  8.  Severe atherosclerosis of the ascending aorta, aortic arch and descending aorta.          Plan:       See Sabrina in 4 months, no medication changes, advised smoking cessation, advised to consider the Covid vaccination.  He will be seeing neurology today.  I did also advise that he follow-up with Dr. Huynh is is been over years since he seen him.

## 2021-08-31 NOTE — PROGRESS NOTES
EMG and Nerve Conduction Studies    I.      Instrument used: Neuromax 1002  II.     Please see data sheets for tabular summary of NCS and details on methods, temperatures and lab standards.   III.    EMG muscles tested for upper extremity studies include the deltoid, biceps, triceps, pronator teres, extensor digitorum communis, first dorsal interosseous and abductor pollicis brevis.    IV.   EMG muscles tested for lower extremity studies include the vastus lateralis, tibialis anterior, peroneus longus, medial gastrocnemius and extensor digitorum brevis.    V.    Additional muscles tested as needed.  Paraspinal muscles tested as needed.   VI.   Please see data sheets for tabular summary of EMG findings.   VII. The complete report includes the data sheets.      Indication: ALS  History: 61-year-old male with 10-year history of diabetes who has presented with primarily some weight loss over a few months along with nasal speech and some generalized weakness.  Recent neurological exam demonstrates brisk reflexes and fasciculations all over.  Study is ordered with question of ALS.      Ht: 180.3 cm  Wt: 90.7 kg; BMI 27.89.  HbA1C:   Lab Results   Component Value Date    HGBA1C 6.50 (H) 08/16/2021     TSH: No results found for: TSH    Technical summary:  Nerve conduction studies were obtained in the left arm and left leg.  The hand and foot were warmed prior to study but temperatures still were fairly low and temperature correction was used where indicated.  Needle examination was obtained on selected muscles of the left arm and left leg.  The patient is on Xarelto and so paraspinals were not studied    Results:  1.  Prolonged left median antidromic sensory distal latency with temperature correction at 4.0 ms with normal amplitude.  2.  Prolonged left ulnar antidromic sensory distal latency with temperature correction at 3.9 ms with normal amplitude.  3.  Normal left radial sensory study.  4.  Normal left median motor  distal latency with temperature correction at 4.1 ms with borderline conduction velocity at 48.4 m/s and low amplitude of 4.1 mV from wrist stimulation.  5.  Normal left ulnar motor distal latency with slow conduction velocity across the elbow at 44.1 m/s and minimally slow velocity below the elbow at 48 m/s.  Low amplitude of 3.8 mV from wrist stimulation.  6.  Normal left sural sensory distal latency and amplitude.  7.  Prolonged left superficial peroneal sensory distal latency with temperature correction at 4.6 ms with normal amplitude.  8.  Normal left peroneal motor conduction velocities, distal latency and amplitudes.  9.  Normal left tibial motor conduction velocity, distal latency and amplitudes.  10.  Needle examination of selected muscles of the left arm and left leg showed increased insertional activities with multiple muscles along with some fasciculations however only if you showed fibrillations or positive sharp waves namely the extensor digitorum communis, pronator teres and triceps in the upper extremities    Impression:  Abnormal study showing mild to moderate peripheral neuropathy.  There are some acute/subacute needle exam changes in several muscles which could fit best a left C7 radiculopathy and left S1 radiculopathy.  Widespread definitive evidence of denervation/reinnervation was not seen and therefore the test is not definitive for motor neuron disease.  Please note the paraspinals were not studied since the patient is anticoagulated.    Marciano Estrada M.D.        Addendum:  Brief exam demonstrates relatively mild diffuse weakness more severe in the intrinsic hand muscles bilaterally as well as mild diffuse weakness in the legs.  Reflexes are brisk in the upper extremities but relatively normal in the lower extremities without crossed adductor jerks.  Toes signs appear downgoing.  Speech is quite hypernasal however and fasciculations are seen rather diffusely in the upper and lower  extremities.  GNS      Dictated utilizing Dragon dictation.

## 2021-09-08 DIAGNOSIS — E78.2 MIXED HYPERLIPIDEMIA: ICD-10-CM

## 2021-09-08 DIAGNOSIS — I25.10 ATHEROSCLEROSIS OF NATIVE CORONARY ARTERY OF NATIVE HEART WITHOUT ANGINA PECTORIS: ICD-10-CM

## 2021-09-08 DIAGNOSIS — E11.59 TYPE 2 DIABETES MELLITUS WITH OTHER CIRCULATORY COMPLICATION, WITHOUT LONG-TERM CURRENT USE OF INSULIN (HCC): ICD-10-CM

## 2021-09-08 RX ORDER — ROSUVASTATIN CALCIUM 20 MG/1
TABLET, COATED ORAL
Qty: 90 TABLET | Refills: 1 | OUTPATIENT
Start: 2021-09-08

## 2021-09-09 RX ORDER — ROSUVASTATIN CALCIUM 40 MG/1
40 TABLET, COATED ORAL DAILY
Qty: 90 TABLET | Refills: 3 | Status: SHIPPED | OUTPATIENT
Start: 2021-09-09 | End: 2021-10-28

## 2021-09-09 NOTE — TELEPHONE ENCOUNTER
Rx Refill Note  Requested Prescriptions     Pending Prescriptions Disp Refills   • rosuvastatin (CRESTOR) 40 MG tablet 90 tablet 3     Sig: Take 1 tablet by mouth Daily.     Refused Prescriptions Disp Refills   • rosuvastatin (CRESTOR) 20 MG tablet [Pharmacy Med Name: ROSUVASTATIN CALCIUM 20 MG TAB] 90 tablet 1     Sig: TAKE 1 TABLET BY MOUTH EVERY DAY     Refused By: ABBI MCGRATH     Reason for Refusal: Refill not appropriate      Last office visit with prescribing clinician: Visit date not found      Next office visit with prescribing clinician: Visit date not found            Jay Estrada MA  09/09/21, 12:41 EDT

## 2021-09-09 NOTE — TELEPHONE ENCOUNTER
Caller: [facility]/pharmacy #[id number] [place], [place] - [address] AT NEAR [facility] [phone] [place] [phone] FX    Relationship:     Best call back number: [phone]    Medication needed:   Requested Prescriptions     Pending Prescriptions Disp Refills   • rosuvastatin (CRESTOR) 40 MG tablet 90 tablet 3     Sig: Take 1 tablet by mouth Daily.     Refused Prescriptions Disp Refills   • rosuvastatin (CRESTOR) 20 MG tablet [Pharmacy Med Name: ROSUVASTATIN CALCIUM 20 MG TAB] 90 tablet 1     Sig: TAKE 1 TABLET BY MOUTH EVERY DAY     Refused By: ABBI MCGRATH     Reason for Refusal: Refill not appropriate       When do you need the refill by: ASAP    What additional details did the patient provide when requesting the medication: PATIENT STATES HE HAS MORE THAN 3 DAYS    Does the patient have less than a 3 day supply:  [] Yes  [x] No    What is the patient's preferred pharmacy: [facility]/PHARMACY #[id number] [place], [place] - [address] AT [address]AR [facility] [phone] [place] [phone] FX

## 2021-09-24 ENCOUNTER — TELEPHONE (OUTPATIENT)
Dept: NEUROLOGY | Facility: CLINIC | Age: 61
End: 2021-09-24

## 2021-09-24 NOTE — TELEPHONE ENCOUNTER
Provider: DR. OZUNA    Caller: ALEX    Relationship to Patient: SELF    Phone Number [phone]    Reason for Call:   SAID HE JUST GOT OFF THE PHONE WITH [facility] AND THEY SAY THEY DON'T HAVE REFERRAL FOR HIM. PLEASE REFAX REF & RECS TO [facility]

## 2021-10-22 DIAGNOSIS — E11.59 TYPE 2 DIABETES MELLITUS WITH OTHER CIRCULATORY COMPLICATION, WITHOUT LONG-TERM CURRENT USE OF INSULIN (HCC): ICD-10-CM

## 2021-10-28 RX ORDER — PRASUGREL 10 MG/1
TABLET, FILM COATED ORAL
Qty: 90 TABLET | Refills: 3 | Status: SHIPPED | OUTPATIENT
Start: 2021-10-28

## 2021-10-28 RX ORDER — ROSUVASTATIN CALCIUM 20 MG/1
TABLET, COATED ORAL
Qty: 90 TABLET | Refills: 1 | Status: SHIPPED | OUTPATIENT
Start: 2021-10-28 | End: 2022-04-07

## 2021-10-28 NOTE — TELEPHONE ENCOUNTER
Last OV 8/31/21.  Next OV 01/03/22.  Labs 8/16/21.  Does not meet protocol please advise. [facility] PAOLA

## 2021-12-13 RX ORDER — SACUBITRIL AND VALSARTAN 24; 26 MG/1; MG/1
TABLET, FILM COATED ORAL
Qty: 60 TABLET | Refills: 11 | OUTPATIENT
Start: 2021-12-13

## 2021-12-13 NOTE — TELEPHONE ENCOUNTER
Please advise filling Entresto    LOV   -   8/31/21  Next   -   1/3/22  Last labs   -   8/16/21  CMP, CBC    Lala JUDD

## 2021-12-15 RX ORDER — SACUBITRIL AND VALSARTAN 24; 26 MG/1; MG/1
TABLET, FILM COATED ORAL
Qty: 60 TABLET | Refills: 11 | OUTPATIENT
Start: 2021-12-15

## 2022-01-03 ENCOUNTER — TELEPHONE (OUTPATIENT)
Dept: CARDIOLOGY | Facility: CLINIC | Age: 62
End: 2022-01-03

## 2022-01-03 NOTE — TELEPHONE ENCOUNTER
Pts wife called stating that pt has recently been diagnosed with ALS and she does not think that he will be able to come in to the office to continue care due to fatigue unless you can suggest something else for pt

## 2022-02-16 RX ORDER — CARVEDILOL 3.12 MG/1
TABLET ORAL
Qty: 180 TABLET | Refills: 3 | Status: SHIPPED | OUTPATIENT
Start: 2022-02-16

## 2022-04-07 RX ORDER — CARVEDILOL 6.25 MG/1
TABLET ORAL
Qty: 180 TABLET | Refills: 3 | Status: SHIPPED | OUTPATIENT
Start: 2022-04-07

## 2022-04-07 RX ORDER — RIVAROXABAN 20 MG/1
TABLET, FILM COATED ORAL
Qty: 90 TABLET | Refills: 3 | Status: SHIPPED | OUTPATIENT
Start: 2022-04-07

## 2022-04-07 RX ORDER — ROSUVASTATIN CALCIUM 20 MG/1
TABLET, COATED ORAL
Qty: 90 TABLET | Refills: 1 | Status: SHIPPED | OUTPATIENT
Start: 2022-04-07

## 2022-04-07 NOTE — TELEPHONE ENCOUNTER
Called patient to get scheduled for a follow up appointment and his wife stated that his ALS has progressed and thinks that he might not make it through the end of the year.  She was wanting to know if you would be willing to refill his medications?  Please advise.    CB: [phone]    Thanks,  Hanny

## 2022-06-30 ENCOUNTER — TELEPHONE (OUTPATIENT)
Dept: CARDIOLOGY | Facility: CLINIC | Age: 62
End: 2022-06-30

## 2023-06-06 NOTE — PROGRESS NOTES
HPI  Sylvain Amador is a 60 y.o. male who is here for follow up of multiple medical problems including previous heart attacks and strokes.  Patient continues with speech impediment which she says has been present since last November.  Apparently unchanged.  Does report had screening colonoscopy in 2010 and discussed options.  Apparently has known left carotid artery occlusion.  Followed by cardiologist.  Previously followed by neurologist but released with stable stroke symptoms.  However on today's exam noticed some unusual fasciculations in both arms.  Do recommend follow-up appointment with neurologist and mentioned could be an indication of ALS?      Review of Systems   Neurological: Positive for tremors, speech difficulty and weakness.        Speech impediment and loss of fine movements in right arm apparently related to previous stroke   All other systems reviewed and are negative.        Past Medical History:   Diagnosis Date   • CAD (coronary artery disease)    • Cataract    • Diabetes mellitus (CMS/[facility])    • Diabetic neuropathy (CMS/[facility])    • GERD (gastroesophageal reflux disease)    • Hyperlipidemia    • Hypertension    • Intermittent claudication (CMS/[facility])    • PAD (peripheral artery disease) (CMS/[facility])     of lower extremities   • Recovering alcoholic (CMS/[facility])    • STEMI (ST elevation myocardial infarction) (CMS/[facility]) 11/2020       Past Surgical History:   Procedure Laterality Date   • ARTERIOGRAM AORTIC Left 8/2/2018    Procedure: AORTIC AND LEFT ILIAC ARTERY ANEURYSM REPAIR;  Surgeon: Arturo Huynh MD;  Location: [facility] OR;  Service: Vascular   • CARDIAC CATHETERIZATION  11/2015   • CARDIAC CATHETERIZATION N/A 11/22/2020    Procedure: Left Heart Cath;  Surgeon: Suraj Vaughn MD;  Location: [facility] INVASIVE LOCATION;  Service: Cardiovascular;  Laterality: N/A;   • CARDIAC CATHETERIZATION N/A 11/22/2020    Procedure: Percutaneous Coronary Intervention;  Surgeon: Johana  Suraj CARVALHO MD;  Location:  [facility] INVASIVE [facility]ATION;  Service: Cardiovascular;  Laterality: N/A;   • CARDIAC CATHETERIZATION N/A 11/22/2020    Procedure: Stent JARRED coronary;  Surgeon: Suraj Vaughn MD;  Location:  [facility]VAS[facility];  Service: Cardiovascular;  Laterality: N/A;   • CARDIAC CATHETERIZATION N/A 11/22/2020    Procedure: Coronary angiography;  Surgeon: Suraj Vaughn MD;  Location:  [facility] INV[facility];  Service: Cardiovascular;  Laterality: N/A;   • CARDIAC CATHETERIZATION N/A 2/4/2021    Procedure: Coronary angiography;  Surgeon: Stefany Dawn MD;  Location:  [facility];  Service: Cardiovascular;  Laterality: N/A;   • CARDIAC CATHETERIZATION N/A 2/4/2021    Procedure: Left Heart Cath;  Surgeon: Stefany Dawn MD;  Location:  [facility];  Service: Cardiovascular;  Laterality: N/A;   • CARDIAC ELECTROPHYSIOLOGY PROCEDURE N/A 11/22/2020    Procedure: Cardioversion/Defibrillation;  Surgeon: Suraj Vaughn MD;  Location:  [facility];  Service: Cardiovascular;  Laterality: N/A;   • CORONARY ANGIOPLASTY WITH STENT PLACEMENT  2015   • RHINOPLASTY  1975   • VASECTOMY         Family History   Problem Relation Age of Onset   • Diabetes Mother    • Hypertension Mother    • Pancreatic cancer Mother    • Cancer Mother    • Esophageal cancer Father    • Cancer Father    • Esophageal cancer Brother    • Cancer Brother    • Cancer Paternal Grandmother    • Diabetes Paternal Grandmother    • Alcohol abuse Brother    • Cancer Brother        Social History     Socioeconomic History   • Marital status:      Spouse name: Not on file   • Number of children: Not on file   • Years of education: Not on file   • Highest education level: Not on file   Tobacco Use   • Smoking status: Current Every Day Smoker     Packs/day: 0.50   • Smokeless tobacco: Current User   Substance and Sexual Activity   • Alcohol use: Yes      Comment: Seldom/ Caffeine use: 8-10 cups daily   • Drug use: No   • Sexual activity: Defer       Vitals:    08/16/21 0800   BP: 108/78   Pulse: 64   Resp: 18   Temp: 97.1 °F (36.2 °C)   SpO2: 97%        Body mass index is 27.45 kg/m².      Physical Exam  Vitals and nursing note reviewed.   Constitutional:       General: He is not in acute distress.     Appearance: He is well-developed.   HENT:      Head: Normocephalic and atraumatic.   Eyes:      Conjunctiva/sclera: Conjunctivae normal.      Pupils: Pupils are equal, round, and reactive to light.   Neck:      Thyroid: No thyromegaly.   Cardiovascular:      Rate and Rhythm: Normal rate and regular rhythm.      Heart sounds: Normal heart sounds.   Pulmonary:      Effort: Pulmonary effort is normal. No respiratory distress.      Breath sounds: Normal breath sounds.   Abdominal:      General: There is no distension.      Palpations: Abdomen is soft. There is no mass.      Tenderness: There is no abdominal tenderness.      Hernia: No hernia is present.   Musculoskeletal:         General: No tenderness or deformity. Normal range of motion.      Cervical back: Normal range of motion.   Lymphadenopathy:      Cervical: No cervical adenopathy.   Skin:     General: Skin is warm and dry.      Coloration: Skin is not pale.      Findings: No rash.          Neurological:      Mental Status: He is alert and oriented to person, place, and time. Mental status is at baseline.      Cranial Nerves: Cranial nerves are intact.      Motor: No abnormal muscle tone.      Coordination: Coordination normal.      Comments: Fasciculations noted both arms   Psychiatric:         Attention and Perception: Attention and perception normal.         Mood and Affect: Mood normal.         Speech: Speech is slurred.         Behavior: Behavior normal.         Thought Content: Thought content normal.         Cognition and Memory: Cognition normal.         Judgment: Judgment normal.            Assessment/Plan    Diagnoses and all orders for this visit:    1. Atherosclerosis of native coronary artery of native heart without angina pectoris (Primary)  -     Lipid Panel    2. Mixed hyperlipidemia  -     Lipid Panel    3. Essential hypertension  -     Comprehensive Metabolic Panel    4. Type 2 diabetes mellitus with other circulatory complication, without long-term current use of insulin (CMS/HCC)  -     Comprehensive Metabolic Panel  -     Hemoglobin A1c    5. ST elevation myocardial infarction involving left anterior descending (LAD) coronary artery (CMS/HCC)    6. High risk medication use  -     CBC & Differential  -     Comprehensive Metabolic Panel    7. Screening for colorectal cancer  -     Ambulatory Referral to General Surgery    8. Fasciculations of muscle  -     Ambulatory Referral to Neurology    Other orders  -     triamcinolone (KENALOG) 0.1 % ointment; Apply  topically to the appropriate area as directed 2 (Two) Times a Day.  Dispense: 30 g; Refill: 5      Patient here for routine follow-up of diabetes and known vascular disease including previous heart attacks and strokes.  Continues with slurred speech which seems to be fairly stable since vascular events last November.  Requesting refill on steroid ointment for elbow.  Also note fasciculations in both arms and do recommend follow-up visit with neurologist as discussed above.  Otherwise seems stable on current regimen and will check routine lab work as noted above.  Continue follow-up with cardiologist.  Also discussed routine colon cancer screening options.  Does report hemorrhoids ambulating and suspect with false positive Cologuard.  Will refer for for colonoscopy evaluation?    This note includes information entered using a voice recognition dictation system.          [Initial Visit] : an initial pain management visit [FreeTextEntry2] : RT KNEE AND RT HIP

## (undated) DEVICE — TR BAND RADIAL ARTERY COMPRESSION DEVICE: Brand: TR BAND

## (undated) DEVICE — CATH DIAG IMPULSE PIG 5F 100CM

## (undated) DEVICE — SUT SILK 2/0 TIES 18IN A185H

## (undated) DEVICE — GLIDESHEATH SLENDER STAINLESS STEEL KIT: Brand: GLIDESHEATH SLENDER

## (undated) DEVICE — SNAR VASC RETRV ENSNARE STD SYS 7F18X30MM 120CM

## (undated) DEVICE — RUNTHROUGH NS EXTRA FLOPPY PTCA GUIDEWIRE: Brand: RUNTHROUGH

## (undated) DEVICE — RADIFOCUS GLIDEWIRE: Brand: GLIDEWIRE

## (undated) DEVICE — PERCLOSE PROGLIDE™ SUTURE-MEDIATED CLOSURE SYSTEM: Brand: PERCLOSE PROGLIDE™

## (undated) DEVICE — CATH DIAG IMPULSE AR2 5F 100CM

## (undated) DEVICE — PK CATH CARD 40

## (undated) DEVICE — SUT VIC 3/0 CTI 36IN J944H

## (undated) DEVICE — ARMADA 35 PTA CATHETER 14 MM X 20 MM X 80 CM / OVER-THE-WIRE: Brand: ARMADA

## (undated) DEVICE — 3M™ STERI-STRIP™ REINFORCED ADHESIVE SKIN CLOSURES, R1547, 1/2 IN X 4 IN (12 MM X 100 MM), 6 STRIPS/ENVELOPE: Brand: 3M™ STERI-STRIP™

## (undated) DEVICE — IMPLANTABLE DEVICE
Type: IMPLANTABLE DEVICE | Site: ARTERY ILIAC | Status: NON-FUNCTIONAL
Removed: 2018-08-02

## (undated) DEVICE — SUT PROLN 6/0 BV1 D/A 30IN 8709H

## (undated) DEVICE — GOWN,NON-REINFORCED,SIRUS,SET IN SLV,XL: Brand: MEDLINE

## (undated) DEVICE — GW AMPLTZ SUPERSTIFF STR .035IN 180CM

## (undated) DEVICE — CATH SZ ACCUVU SEG/20CM PIG 5F 100CM

## (undated) DEVICE — Device

## (undated) DEVICE — MAT FLR ABSORBENT LG 4FT 10 2.5FT

## (undated) DEVICE — DRP C/ARM 41X74IN

## (undated) DEVICE — CATH F5 INF 3DRC 100CM: Brand: INFINITI

## (undated) DEVICE — GW EMR FIX EXCHG J STD .035 3MM 260CM

## (undated) DEVICE — 200 ML FASTURN SYRINGE WITH QUICK FILL TUBE(ANGIO-SET,PKG,200ML FASTURN SYR W/QFT,MC)(60729695): Brand: MEDRAD® MARK V PROVIS 200ML FASTURN STERILE DISPOSABLE SYRINGE & QFT

## (undated) DEVICE — CATH TEMPO 5F BER II 65CM: Brand: TEMPO

## (undated) DEVICE — ADAPT CHECKFLO PERFORMER ASSEMBL

## (undated) DEVICE — CATH SZ ACCUVU SEG/20CM PIG .038IN 5F 70CM

## (undated) DEVICE — 3M™ IOBAN™ 2 ANTIMICROBIAL INCISE DRAPE 6648EZ: Brand: IOBAN™ 2

## (undated) DEVICE — CATH DIAG IMPULSE FR4 5F 100CM

## (undated) DEVICE — ANTIBACTERIAL UNDYED BRAIDED (POLYGLACTIN 910), SYNTHETIC ABSORBABLE SUTURE: Brand: COATED VICRYL

## (undated) DEVICE — STPLR SKIN VISISTAT WD 35CT

## (undated) DEVICE — RADIFOCUS TORQUE DEVICE MULTI-TORQUE VISE: Brand: RADIFOCUS TORQUE DEVICE

## (undated) DEVICE — CONTAINER,SPECIMEN,OR STERILE,4OZ: Brand: MEDLINE

## (undated) DEVICE — SUT SILK 3/0 TIES 18IN A184H

## (undated) DEVICE — GAUZE,SPONGE,4"X4",16PLY,XRAY,STRL,LF: Brand: MEDLINE

## (undated) DEVICE — 1016 S-DRAPE IRRIG POUCH 10/BOX: Brand: STERI-DRAPE™

## (undated) DEVICE — CATH VENT MIV RADL PIG ST TIP 5F 110CM

## (undated) DEVICE — GLV SURG BIOGEL LTX PF 7

## (undated) DEVICE — STENTGR ENDOPROSTH VIABAHN VBX EXP 8F 11X16X59MM 135CM
Type: IMPLANTABLE DEVICE | Site: ARTERY ILIAC | Status: NON-FUNCTIONAL
Removed: 2018-08-02

## (undated) DEVICE — BALN STENTGR Q50

## (undated) DEVICE — PTCA DILATATION CATHETER: Brand: NC QUANTUM APEX™

## (undated) DEVICE — 6F .070 JR 4 100CM: Brand: CORDIS

## (undated) DEVICE — CVR PROB 96IN LF STRL

## (undated) DEVICE — NDL PERC 1PRT THNWALL W/BASEPLT 18G 7CM

## (undated) DEVICE — PINNACLE R/O II HIFLO INTRODUCER SHEATH WITH RADIOPAQUE MARKER: Brand: PINNACLE

## (undated) DEVICE — SUT SILK 2/0 SH CR5 18IN C0125

## (undated) DEVICE — SOL NACL 0.9PCT 1000ML

## (undated) DEVICE — BND PRESS RADL COMFRT 14IN STRL

## (undated) DEVICE — PK AAA 40

## (undated) DEVICE — COVER,TABLE,HEAVY DUTY,79"X110",STRL: Brand: MEDLINE

## (undated) DEVICE — CATH DIAG IMPULSE FL3.5 5F 100CM

## (undated) DEVICE — CVR EQ IMG 48X27

## (undated) DEVICE — INTRO SHEATH DRYSEAL FLEX 16F 5.3TO6.1MM 33CM

## (undated) DEVICE — TBG PRESS HI FLX BR 96IN

## (undated) DEVICE — ADHS SKIN DERMABOND TOP ADVANCED

## (undated) DEVICE — GW HYDRPHLC STD ANG .035IN 180CM

## (undated) DEVICE — TOTAL TRAY, 16FR 10ML SIL FOLEY, URN: Brand: MEDLINE

## (undated) DEVICE — SYR LL TP 10ML STRL

## (undated) DEVICE — 6F .070 XB 3.5 100CM: Brand: VISTA BRITE TIP

## (undated) DEVICE — TREK CORONARY DILATATION CATHETER 3.50 MM X 15 MM / RAPID-EXCHANGE: Brand: TREK

## (undated) DEVICE — SUT SILK 4/0 TIES 18IN A183H

## (undated) DEVICE — PTA BALLOON DILATATION CATHETER: Brand: MUSTANG™

## (undated) DEVICE — RADIFOCUS GLIDEWIRE ADVANTAGE GUIDEWIRE: Brand: GLIDEWIRE ADVANTAGE

## (undated) DEVICE — KT MANIFLD CARDIAC

## (undated) DEVICE — ARMADA 35 PTA CATHETER 8 MM X 60 MM X 80 CM / OVER-THE-WIRE: Brand: ARMADA

## (undated) DEVICE — ST ACC MICROPUNCTURE STFF .018 ECHO/PLDM/TP 4F/10CM 21G/7CM

## (undated) DEVICE — INTRO SHEATH DRYSEAL FLEX 12F 4TO1.7MM 45CM

## (undated) DEVICE — INFLATION DEVICE: Brand: ENCORE™ 26

## (undated) DEVICE — SUT PROLN 5/0 RB1 D/A 36IN 8556H

## (undated) DEVICE — PINNACLE R/O II INTRODUCER SHEATH WITH RADIOPAQUE MARKER: Brand: PINNACLE

## (undated) DEVICE — CATH DIAG IMPULSE FR5 5F 100CM

## (undated) DEVICE — SYR LUERLOK 30CC

## (undated) DEVICE — INTRO SHEATH DRYSEAL FLEX 18F 6.0TO6.7MM 33CM

## (undated) DEVICE — GW AMPLTZ SUPERSTIFF SHT/TPR STR .035IN 260CM